# Patient Record
Sex: FEMALE | Race: OTHER | NOT HISPANIC OR LATINO | URBAN - METROPOLITAN AREA
[De-identification: names, ages, dates, MRNs, and addresses within clinical notes are randomized per-mention and may not be internally consistent; named-entity substitution may affect disease eponyms.]

---

## 2018-03-14 ENCOUNTER — INPATIENT (INPATIENT)
Facility: HOSPITAL | Age: 58
LOS: 1 days | Discharge: HOME | DRG: 310 | End: 2018-03-16
Attending: INTERNAL MEDICINE | Admitting: INTERNAL MEDICINE
Payer: COMMERCIAL

## 2018-03-14 VITALS
HEART RATE: 116 BPM | DIASTOLIC BLOOD PRESSURE: 100 MMHG | SYSTOLIC BLOOD PRESSURE: 156 MMHG | TEMPERATURE: 98 F | OXYGEN SATURATION: 98 % | RESPIRATION RATE: 16 BRPM

## 2018-03-14 DIAGNOSIS — E05.90 THYROTOXICOSIS, UNSPECIFIED WITHOUT THYROTOXIC CRISIS OR STORM: ICD-10-CM

## 2018-03-14 DIAGNOSIS — I48.91 UNSPECIFIED ATRIAL FIBRILLATION: ICD-10-CM

## 2018-03-14 LAB
ALBUMIN SERPL ELPH-MCNC: 4 G/DL — SIGNIFICANT CHANGE UP (ref 3.3–5)
ALP SERPL-CCNC: 80 U/L — SIGNIFICANT CHANGE UP (ref 40–120)
ALT FLD-CCNC: 19 U/L — SIGNIFICANT CHANGE UP (ref 10–45)
ANION GAP SERPL CALC-SCNC: 12 MMOL/L — SIGNIFICANT CHANGE UP (ref 5–17)
APTT BLD: 30.9 SEC — SIGNIFICANT CHANGE UP (ref 27.5–37.4)
AST SERPL-CCNC: 19 U/L — SIGNIFICANT CHANGE UP (ref 10–40)
BASOPHILS NFR BLD AUTO: 0.1 % — SIGNIFICANT CHANGE UP (ref 0–2)
BILIRUB SERPL-MCNC: 0.2 MG/DL — SIGNIFICANT CHANGE UP (ref 0.2–1.2)
BUN SERPL-MCNC: 9 MG/DL — SIGNIFICANT CHANGE UP (ref 7–23)
CALCIUM SERPL-MCNC: 9.9 MG/DL — SIGNIFICANT CHANGE UP (ref 8.4–10.5)
CHLORIDE SERPL-SCNC: 100 MMOL/L — SIGNIFICANT CHANGE UP (ref 96–108)
CK MB CFR SERPL CALC: 1.7 NG/ML — SIGNIFICANT CHANGE UP (ref 0–6.7)
CK SERPL-CCNC: 49 U/L — SIGNIFICANT CHANGE UP (ref 25–170)
CO2 SERPL-SCNC: 26 MMOL/L — SIGNIFICANT CHANGE UP (ref 22–31)
CREAT SERPL-MCNC: 0.51 MG/DL — SIGNIFICANT CHANGE UP (ref 0.5–1.3)
EOSINOPHIL NFR BLD AUTO: 3.2 % — SIGNIFICANT CHANGE UP (ref 0–6)
GLUCOSE SERPL-MCNC: 113 MG/DL — HIGH (ref 70–99)
HCT VFR BLD CALC: 36.4 % — SIGNIFICANT CHANGE UP (ref 34.5–45)
HGB BLD-MCNC: 12 G/DL — SIGNIFICANT CHANGE UP (ref 11.5–15.5)
INR BLD: 1.1 — SIGNIFICANT CHANGE UP (ref 0.88–1.16)
LYMPHOCYTES # BLD AUTO: 34.2 % — SIGNIFICANT CHANGE UP (ref 13–44)
MCHC RBC-ENTMCNC: 27.7 PG — SIGNIFICANT CHANGE UP (ref 27–34)
MCHC RBC-ENTMCNC: 33 G/DL — SIGNIFICANT CHANGE UP (ref 32–36)
MCV RBC AUTO: 84.1 FL — SIGNIFICANT CHANGE UP (ref 80–100)
MONOCYTES NFR BLD AUTO: 9.3 % — SIGNIFICANT CHANGE UP (ref 2–14)
NEUTROPHILS NFR BLD AUTO: 53.2 % — SIGNIFICANT CHANGE UP (ref 43–77)
PLATELET # BLD AUTO: 287 K/UL — SIGNIFICANT CHANGE UP (ref 150–400)
POTASSIUM SERPL-MCNC: 3.5 MMOL/L — SIGNIFICANT CHANGE UP (ref 3.5–5.3)
POTASSIUM SERPL-SCNC: 3.5 MMOL/L — SIGNIFICANT CHANGE UP (ref 3.5–5.3)
PROT SERPL-MCNC: 7.5 G/DL — SIGNIFICANT CHANGE UP (ref 6–8.3)
PROTHROM AB SERPL-ACNC: 12.2 SEC — SIGNIFICANT CHANGE UP (ref 9.8–12.7)
RBC # BLD: 4.33 M/UL — SIGNIFICANT CHANGE UP (ref 3.8–5.2)
RBC # FLD: 12.9 % — SIGNIFICANT CHANGE UP (ref 10.3–16.9)
SODIUM SERPL-SCNC: 138 MMOL/L — SIGNIFICANT CHANGE UP (ref 135–145)
T3FREE SERPL-MCNC: 8.5 PG/ML — HIGH (ref 1.71–3.71)
T4 AB SER-ACNC: 15.77 UG/DL — HIGH (ref 3.17–11.72)
T4 FREE SERPL-MCNC: 2.37 NG/DL — HIGH (ref 0.7–1.48)
TROPONIN T SERPL-MCNC: <0.01 NG/ML — SIGNIFICANT CHANGE UP (ref 0–0.01)
WBC # BLD: 7.4 K/UL — SIGNIFICANT CHANGE UP (ref 3.8–10.5)
WBC # FLD AUTO: 7.4 K/UL — SIGNIFICANT CHANGE UP (ref 3.8–10.5)

## 2018-03-14 PROCEDURE — 71275 CT ANGIOGRAPHY CHEST: CPT | Mod: 26

## 2018-03-14 PROCEDURE — 99285 EMERGENCY DEPT VISIT HI MDM: CPT | Mod: 25

## 2018-03-14 PROCEDURE — 99222 1ST HOSP IP/OBS MODERATE 55: CPT | Mod: AI

## 2018-03-14 PROCEDURE — 93010 ELECTROCARDIOGRAM REPORT: CPT

## 2018-03-14 PROCEDURE — 71045 X-RAY EXAM CHEST 1 VIEW: CPT | Mod: 26

## 2018-03-14 RX ORDER — HEPARIN SODIUM 5000 [USP'U]/ML
6000 INJECTION INTRAVENOUS; SUBCUTANEOUS ONCE
Qty: 0 | Refills: 0 | Status: COMPLETED | OUTPATIENT
Start: 2018-03-14 | End: 2018-03-14

## 2018-03-14 RX ORDER — POTASSIUM CHLORIDE 20 MEQ
20 PACKET (EA) ORAL ONCE
Qty: 0 | Refills: 0 | Status: COMPLETED | OUTPATIENT
Start: 2018-03-14 | End: 2018-03-14

## 2018-03-14 RX ORDER — HEPARIN SODIUM 5000 [USP'U]/ML
3000 INJECTION INTRAVENOUS; SUBCUTANEOUS EVERY 6 HOURS
Qty: 0 | Refills: 0 | Status: DISCONTINUED | OUTPATIENT
Start: 2018-03-14 | End: 2018-03-16

## 2018-03-14 RX ORDER — HEPARIN SODIUM 5000 [USP'U]/ML
6000 INJECTION INTRAVENOUS; SUBCUTANEOUS EVERY 6 HOURS
Qty: 0 | Refills: 0 | Status: DISCONTINUED | OUTPATIENT
Start: 2018-03-14 | End: 2018-03-16

## 2018-03-14 RX ORDER — PROPRANOLOL HCL 160 MG
20 CAPSULE, EXTENDED RELEASE 24HR ORAL
Qty: 0 | Refills: 0 | Status: DISCONTINUED | OUTPATIENT
Start: 2018-03-14 | End: 2018-03-15

## 2018-03-14 RX ORDER — HEPARIN SODIUM 5000 [USP'U]/ML
INJECTION INTRAVENOUS; SUBCUTANEOUS
Qty: 25000 | Refills: 0 | Status: DISCONTINUED | OUTPATIENT
Start: 2018-03-14 | End: 2018-03-15

## 2018-03-14 RX ADMIN — HEPARIN SODIUM 1300 UNIT(S)/HR: 5000 INJECTION INTRAVENOUS; SUBCUTANEOUS at 20:47

## 2018-03-14 RX ADMIN — Medication 20 MILLIGRAM(S): at 23:28

## 2018-03-14 RX ADMIN — HEPARIN SODIUM 6000 UNIT(S): 5000 INJECTION INTRAVENOUS; SUBCUTANEOUS at 20:47

## 2018-03-14 RX ADMIN — Medication 20 MILLIEQUIVALENT(S): at 20:48

## 2018-03-14 NOTE — ED ADULT TRIAGE NOTE - CHIEF COMPLAINT QUOTE
biba c/o dyspnea on exertion x 3 weeks w/ associated epigastric discomfort with activity.  Patient was told by her doctor that she has new onset afib. Denies discomfort during triage.

## 2018-03-14 NOTE — ED ADULT NURSE NOTE - OBJECTIVE STATEMENT
Pt BIBA to ed A&Ox3 from NP office for new onset afib. pt states she went to doctor office for intermittent lower abdominal pain/cramping she has had for 3 weeks. pt also reports SOB on exertion and when experiencing abd pain. denies cp, fever, n/v/d, burning urination, dysuria, dizziness, headache. pt reports chronic back pain.

## 2018-03-14 NOTE — ED PROVIDER NOTE - OBJECTIVE STATEMENT
59 y/o F w/no sig pmhx p/w generalized fatigue, palpitations and exertional SOB for 3 weeks, no fever/chills/cough or LE pain/swelling. No hemoptysis, OCP use, or pleuritic component to sx. Denies charles chest pain but endorses a constant chest discomfort for same duration. Seen by NP in office today, found to be in rapid afib, new onset. Denies significant weight loss.

## 2018-03-14 NOTE — ED PROVIDER NOTE - MEDICAL DECISION MAKING DETAILS
+ new onset afib, cause uncertain -- less likely missed MI, trop negative, no Q waves to suggest prior infarct. Less likely PE given lack of risk factors or physical exam findings, no electrolyte disturbance or infectious etiology (normal cxr, no fever/leukocytosis). Admitting to cardiology for advanced cardiac imaging, possible initiation of AC. CHADS-Vasc low, not initiating in ED. Rate <120, not initiating rate control.

## 2018-03-14 NOTE — ED ADULT TRIAGE NOTE - CCCP TRG CHIEF CMPLNT
10/27/2017     RE: Eliana Anders  [address]  [facility] [id number]     Dear Colleague,    Thank you for referring your patient, Eliana Anders, to the [facility] at [facility]. Please see a copy of my visit note below.    Patient initially seen for incidental lung nodule. She was concerned despite reassurance that unlikely to be cancer. At her request, I arranged for IR consult. At the consult, recommendation made to continue monitoring with CT surveillance. I spoke with patient today to confirm the plan and she is in agreement.     RTC February 2018 with CT chest    Again, thank you for allowing me to participate in the care of your patient.      Sincerely,    Susan Herzog MD      
shortness of breath

## 2018-03-14 NOTE — ED PROVIDER NOTE - PHYSICAL EXAMINATION
VITAL SIGNS: I have reviewed nursing notes and confirm.  CONSTITUTIONAL: Well-developed; well-nourished; in no acute distress.  SKIN: Agree with RN documentation regarding decubitus evaluation. Remainder of skin exam is warm and dry, no acute rash.  HEAD: Normocephalic; atraumatic.  EYES: PERRL, EOM intact; conjunctiva and sclera clear.  ENT: No nasal discharge; airway clear.  NECK: Supple; non tender.  CARD: S1, S2 normal; no murmurs, gallops, or rubs. + irregularly irregular rhythm  RESP: No wheezes, rales or rhonchi.  ABD: Normal bowel sounds; soft; non-distended; non-tender; no hepatosplenomegaly.  EXT: Normal ROM. No clubbing, cyanosis or edema.  LYMPH: No acute cervical adenopathy.  NEURO: Alert, oriented. Grossly unremarkable.  PSYCH: Cooperative, appropriate.

## 2018-03-14 NOTE — ED ADULT NURSE NOTE - CHPI ED SYMPTOMS NEG
no cough/no back pain/no nausea/no chills/no dizziness/no vomiting/no syncope/no diaphoresis/no chest pain/no fever

## 2018-03-14 NOTE — H&P ADULT - PROBLEM SELECTOR PLAN 1
with RVR - will start propranolol 20mg po BID given underlying  hyperthyroidism  will start IV heparin for now regardless results of CTA  [id number] is 1  echo ordered

## 2018-03-14 NOTE — H&P ADULT - NSHPLABSRESULTS_GEN_ALL_CORE
12.0   7.4   )-----------( 287      ( 14 Mar 2018 17:06 )             36.4   03-14    138  |  100  |  9   ----------------------------<  113<H>  3.5   |  26  |  0.51    Ca    9.9      14 Mar 2018 17:06    TPro  7.5  /  Alb  4.0  /  TBili  0.2  /  DBili  x   /  AST  19  /  ALT  19  /  AlkPhos  80  03-14    troponin neg x1    TSH <0.004    D-dimer elevate at 274

## 2018-03-14 NOTE — H&P ADULT - NSHPPHYSICALEXAM_GEN_ALL_CORE
NAD   156/100  afebrile   Neck - no masses/nodules appreciated  Chest: CTA b/l  Cor: S1S2 irreg irreg, no murmurs appreciated  Abd: soft, NT/ND  Ext: no edema, well perfused

## 2018-03-14 NOTE — H&P ADULT - HISTORY OF PRESENT ILLNESS
57 y/o woman non smoker with no known PMH (on no meds at home) was sent from PMD/NP office to [facility] ED for a new onset A fib on office EKG -120.  In ED VS: 147/89, A fib 100-120, afebrile, RR 20, O2 sat 98% RA, troponin negative x 1, d-dimer elevated at  274, TSH low at 0.004.  Patient is awaiting Chest CTA in ED.    Patient is poor historian.  For the past several weeks with fatigue, exertional SOB, palpitations and abdominal cramps without change in bowel.  Admits to unintentional weight loss but cant say how much.  Denies any prior hx endocrine/thyroid problems, denies any prior hx arrhythmias. 57 y/o woman non smoker with no known PMH (on no meds at home) was sent from PMD/NP office to [facility] ED for a new onset A fib on office EKG -120.  In ED VS: 147/89, A fib 100-120, afebrile, RR 20, O2 sat 98% RA, troponin negative x 1, d-dimer elevated at  274, TSH low at 0.004.  Patient is awaiting Chest CTA in ED. No meds given in ED.    Patient is poor historian.  For the past several weeks with fatigue, exertional SOB, palpitations and abdominal cramps without change in bowel.  Admits to unintentional weight loss but cant say how much.  Denies any prior hx endocrine/thyroid problems, denies any prior hx arrhythmias.

## 2018-03-14 NOTE — H&P ADULT - PROBLEM SELECTOR PLAN 2
endocrine consult called - will see pt in am   all requested labs and thyroid US ordered  no meds initiated on admission as d/w endocrine

## 2018-03-14 NOTE — H&P ADULT - ASSESSMENT
57 y/o woman non smoker with no known PMH (on no meds at home) was sent from PMD/NP office to [facility] ED for a new onset A fib rapid -120, labs revealed  hyperthyroidism with TSH <0.004 and d-dimer elevated at 274.  CTA PE protocol pending,

## 2018-03-15 LAB
ANION GAP SERPL CALC-SCNC: 11 MMOL/L — SIGNIFICANT CHANGE UP (ref 5–17)
APTT BLD: 150 SEC — CRITICAL HIGH (ref 27.5–37.4)
APTT BLD: 53.6 SEC — HIGH (ref 27.5–37.4)
APTT BLD: 87 SEC — HIGH (ref 27.5–37.4)
BUN SERPL-MCNC: 11 MG/DL — SIGNIFICANT CHANGE UP (ref 7–23)
CALCIUM SERPL-MCNC: 10 MG/DL — SIGNIFICANT CHANGE UP (ref 8.4–10.5)
CHLORIDE SERPL-SCNC: 102 MMOL/L — SIGNIFICANT CHANGE UP (ref 96–108)
CHOLEST SERPL-MCNC: 121 MG/DL — SIGNIFICANT CHANGE UP (ref 10–199)
CO2 SERPL-SCNC: 28 MMOL/L — SIGNIFICANT CHANGE UP (ref 22–31)
CREAT SERPL-MCNC: 0.64 MG/DL — SIGNIFICANT CHANGE UP (ref 0.5–1.3)
GLUCOSE SERPL-MCNC: 104 MG/DL — HIGH (ref 70–99)
HBA1C BLD-MCNC: 5.3 % — SIGNIFICANT CHANGE UP (ref 4–5.6)
HCT VFR BLD CALC: 37 % — SIGNIFICANT CHANGE UP (ref 34.5–45)
HCT VFR BLD CALC: 39.9 % — SIGNIFICANT CHANGE UP (ref 34.5–45)
HDLC SERPL-MCNC: 49 MG/DL — SIGNIFICANT CHANGE UP (ref 40–125)
HGB BLD-MCNC: 12 G/DL — SIGNIFICANT CHANGE UP (ref 11.5–15.5)
HGB BLD-MCNC: 13 G/DL — SIGNIFICANT CHANGE UP (ref 11.5–15.5)
LIPID PNL WITH DIRECT LDL SERPL: 57 MG/DL — SIGNIFICANT CHANGE UP
MAGNESIUM SERPL-MCNC: 1.7 MG/DL — SIGNIFICANT CHANGE UP (ref 1.6–2.6)
MCHC RBC-ENTMCNC: 27.3 PG — SIGNIFICANT CHANGE UP (ref 27–34)
MCHC RBC-ENTMCNC: 27.8 PG — SIGNIFICANT CHANGE UP (ref 27–34)
MCHC RBC-ENTMCNC: 32.4 G/DL — SIGNIFICANT CHANGE UP (ref 32–36)
MCHC RBC-ENTMCNC: 32.6 G/DL — SIGNIFICANT CHANGE UP (ref 32–36)
MCV RBC AUTO: 84.1 FL — SIGNIFICANT CHANGE UP (ref 80–100)
MCV RBC AUTO: 85.4 FL — SIGNIFICANT CHANGE UP (ref 80–100)
PLATELET # BLD AUTO: 284 K/UL — SIGNIFICANT CHANGE UP (ref 150–400)
PLATELET # BLD AUTO: 316 K/UL — SIGNIFICANT CHANGE UP (ref 150–400)
POTASSIUM SERPL-MCNC: 3.9 MMOL/L — SIGNIFICANT CHANGE UP (ref 3.5–5.3)
POTASSIUM SERPL-SCNC: 3.9 MMOL/L — SIGNIFICANT CHANGE UP (ref 3.5–5.3)
RBC # BLD: 4.4 M/UL — SIGNIFICANT CHANGE UP (ref 3.8–5.2)
RBC # BLD: 4.67 M/UL — SIGNIFICANT CHANGE UP (ref 3.8–5.2)
RBC # FLD: 13.1 % — SIGNIFICANT CHANGE UP (ref 10.3–16.9)
RBC # FLD: 13.3 % — SIGNIFICANT CHANGE UP (ref 10.3–16.9)
SODIUM SERPL-SCNC: 141 MMOL/L — SIGNIFICANT CHANGE UP (ref 135–145)
T3 SERPL-MCNC: 343 NG/DL — HIGH (ref 80–200)
T4 FREE SERPL-MCNC: 2.09 NG/DL — HIGH (ref 0.7–1.48)
THYROPEROXIDASE AB SERPL-ACNC: 273 IU/ML — HIGH (ref 0–34.9)
TOTAL CHOLESTEROL/HDL RATIO MEASUREMENT: 2.5 RATIO — LOW (ref 3.3–7.1)
TRIGL SERPL-MCNC: 76 MG/DL — SIGNIFICANT CHANGE UP (ref 10–149)
TROPONIN T SERPL-MCNC: <0.01 NG/ML — SIGNIFICANT CHANGE UP (ref 0–0.01)
TSH SERPL-MCNC: <0.004 UIU/ML — LOW (ref 0.35–4.94)
WBC # BLD: 6.6 K/UL — SIGNIFICANT CHANGE UP (ref 3.8–10.5)
WBC # BLD: 8.6 K/UL — SIGNIFICANT CHANGE UP (ref 3.8–10.5)
WBC # FLD AUTO: 6.6 K/UL — SIGNIFICANT CHANGE UP (ref 3.8–10.5)
WBC # FLD AUTO: 8.6 K/UL — SIGNIFICANT CHANGE UP (ref 3.8–10.5)

## 2018-03-15 PROCEDURE — 99233 SBSQ HOSP IP/OBS HIGH 50: CPT

## 2018-03-15 PROCEDURE — 93306 TTE W/DOPPLER COMPLETE: CPT | Mod: 26

## 2018-03-15 PROCEDURE — 99222 1ST HOSP IP/OBS MODERATE 55: CPT | Mod: GC

## 2018-03-15 RX ORDER — NYSTATIN CREAM 100000 [USP'U]/G
1 CREAM TOPICAL
Qty: 0 | Refills: 0 | Status: DISCONTINUED | OUTPATIENT
Start: 2018-03-15 | End: 2018-03-16

## 2018-03-15 RX ORDER — MAGNESIUM OXIDE 400 MG ORAL TABLET 241.3 MG
400 TABLET ORAL ONCE
Qty: 0 | Refills: 0 | Status: COMPLETED | OUTPATIENT
Start: 2018-03-15 | End: 2018-03-15

## 2018-03-15 RX ORDER — POTASSIUM CHLORIDE 20 MEQ
20 PACKET (EA) ORAL ONCE
Qty: 0 | Refills: 0 | Status: COMPLETED | OUTPATIENT
Start: 2018-03-15 | End: 2018-03-15

## 2018-03-15 RX ORDER — HEPARIN SODIUM 5000 [USP'U]/ML
INJECTION INTRAVENOUS; SUBCUTANEOUS
Qty: 25000 | Refills: 0 | Status: DISCONTINUED | OUTPATIENT
Start: 2018-03-15 | End: 2018-03-16

## 2018-03-15 RX ORDER — BACITRACIN ZINC 500 UNIT/G
1 OINTMENT IN PACKET (EA) TOPICAL DAILY
Qty: 0 | Refills: 0 | Status: DISCONTINUED | OUTPATIENT
Start: 2018-03-15 | End: 2018-03-16

## 2018-03-15 RX ORDER — PROPRANOLOL HCL 160 MG
20 CAPSULE, EXTENDED RELEASE 24HR ORAL THREE TIMES A DAY
Qty: 0 | Refills: 0 | Status: DISCONTINUED | OUTPATIENT
Start: 2018-03-15 | End: 2018-03-16

## 2018-03-15 RX ORDER — PANTOPRAZOLE SODIUM 20 MG/1
40 TABLET, DELAYED RELEASE ORAL
Qty: 0 | Refills: 0 | Status: DISCONTINUED | OUTPATIENT
Start: 2018-03-15 | End: 2018-03-16

## 2018-03-15 RX ADMIN — HEPARIN SODIUM 1500 UNIT(S)/HR: 5000 INJECTION INTRAVENOUS; SUBCUTANEOUS at 10:16

## 2018-03-15 RX ADMIN — MAGNESIUM OXIDE 400 MG ORAL TABLET 400 MILLIGRAM(S): 241.3 TABLET ORAL at 11:26

## 2018-03-15 RX ADMIN — Medication 20 MILLIEQUIVALENT(S): at 11:25

## 2018-03-15 RX ADMIN — HEPARIN SODIUM 1300 UNIT(S)/HR: 5000 INJECTION INTRAVENOUS; SUBCUTANEOUS at 03:23

## 2018-03-15 RX ADMIN — Medication 1 APPLICATION(S): at 07:25

## 2018-03-15 RX ADMIN — HEPARIN SODIUM 0 UNIT(S)/HR: 5000 INJECTION INTRAVENOUS; SUBCUTANEOUS at 18:16

## 2018-03-15 RX ADMIN — Medication 20 MILLIGRAM(S): at 22:22

## 2018-03-15 RX ADMIN — HEPARIN SODIUM 3000 UNIT(S): 5000 INJECTION INTRAVENOUS; SUBCUTANEOUS at 10:19

## 2018-03-15 RX ADMIN — NYSTATIN CREAM 1 APPLICATION(S): 100000 CREAM TOPICAL at 05:46

## 2018-03-15 RX ADMIN — NYSTATIN CREAM 1 APPLICATION(S): 100000 CREAM TOPICAL at 22:28

## 2018-03-15 RX ADMIN — HEPARIN SODIUM 1300 UNIT(S)/HR: 5000 INJECTION INTRAVENOUS; SUBCUTANEOUS at 19:18

## 2018-03-15 RX ADMIN — Medication 20 MILLIGRAM(S): at 11:25

## 2018-03-15 NOTE — PROGRESS NOTE ADULT - ASSESSMENT
59 y/o woman non smoker with no known PMH (on no meds at home) was sent from PMD/NP office to [facility] ED for a new onset A fib rapid -120, labs revealed  hyperthyroidism with TSH <0.004 and d-dimer elevated at 274. Admitted to [facility] for further management of afib RVR in the setting of hyperthyroidism.

## 2018-03-15 NOTE — PROVIDER CONTACT NOTE (CRITICAL VALUE NOTIFICATION) - ACTION/TREATMENT ORDERED:
STOP infusion for 60 minutes, then DECREASE dose rate by: 200 Unit(s)/Hr (2 mL/Hr). New Rate: 1300 Unit(s)/Hr (13 mL/Hr)

## 2018-03-15 NOTE — PROVIDER CONTACT NOTE (CRITICAL VALUE NOTIFICATION) - RECOMMENDATIONS
STOP infusion for 60 minutes, then DECREASE dose rate by: 200 Unit(s)/Hr (2 mL/Hr).New Rate: 1300 Unit(s)/Hr (13 mL/Hr)

## 2018-03-15 NOTE — CONSULT NOTE ADULT - SUBJECTIVE AND OBJECTIVE BOX
HPI:  59yo Female, no known PMH (not on any meds at home) was sent in from PMD/ NP office to [facility] ED for a new onset rapid Afib on office EKG (-120).  In ED VS: 147/89, Afib 100-120bpm, afebrile, RR 20, O2 sat 98% RA, troponin negative x1, d-dimer elevated at  274, TSH < 0.004.     Patient states bailey the past several weeks she has had fatigue, exertional SOB, palpitations and abdominal cramps without change in bowel habits. Last BM was 2 days ago. Patient admits to unintentional weight loss but she is unable to quantify.  Denies any prior h/o endocrine/thyroid problems, denies any prior h/o arrhythmias. Denies syncope, near syncope, dizziness.     Patient denies palpitations currently despite Afib with RVR at 110bpm. Patient states she is currently unaware of her irregular heart rhythm. She reports her abdominal pain has also now improved.     PAST MEDICAL & SURGICAL HISTORY:  No pertinent past medical history  No significant past surgical history      No pertinent family history in first degree relatives    Social History:   Smoking Denies   Drugs Denies   ETOH Denies     pertinent home medications: None    Inpatient Medications:   BACItracin   Ointment 1 Application(s) Topical daily  heparin  Infusion.  Unit(s)/Hr IV Continuous <Continuous>  heparin  Injectable 6000 Unit(s) IV Push every 6 hours PRN  heparin  Injectable 3000 Unit(s) IV Push every 6 hours PRN  nystatin Powder 1 Application(s) Topical two times a day  propranolol 20 milliGRAM(s) Oral two times a day      No Known Allergies      ROS:   CONSTITUTIONAL: No fever, +weight loss, +fatigue, see HPI  EYES: Pt denies  RESPIRATORY: Denies cough, wheezing, chills or hemoptysis  CARDIOVASCULAR: +exertional SOB, +Palpitations, see HPI  GASTROINTESTINAL: +Abdominal cramping, see HPI  NEUROLOGICAL: Pt denies  SKIN: Pt denies   PSYCHIATRIC: Pt denies  HEME/LYMPH: Pt denies    PHYSICAL:  T(C): 36.1 (03-15-18 @ 14:51), Max: 36.9 (18 @ 22:26)  HR: 102 (03-15-18 @ 11:20) (87 - 116)  BP: 135/83 (03-15-18 @ 11:20) (127/85 - 176/94)  RR: 16 (03-15-18 @ 11:20) (16 - 18)  SpO2: 96% (03-15-18 @ 11:20) (94% - 98%)  Daily Height in cm: 147.32 (15 Mar 2018 00:44)    Daily Weight in k.2 (15 Mar 2018 06:19)    Appearance: NAD, pleasant, conversant   Cardiovascular: Irregular, Tachycardia , S1S2, No JVD, No murmurs, No edema  Respiratory: Lungs clear to auscultation bilaterally.  No wheeze, rhonchi, rales  Gastrointestinal:  Soft, NT/ND, + BS	  Neurologic: A&O x3, No deficit noted  Extremities: WWP, No edema, pulses intact      LABS:                        12.0   6.6   )-----------( 284      ( 15 Mar 2018 08:00 )             37.0     03-15    141  |  102  |  11  ----------------------------<  104<H>  3.9   |  28  |  0.64    Ca    10.0      15 Mar 2018 08:00  Mg     1.7     03-15    TPro  7.5  /  Alb  4.0  /  TBili  0.2  /  DBili  x   /  AST  19  /  ALT  19  /  AlkPhos  80  03-14    PT/INR - ( 14 Mar 2018 17:06 )   PT: 12.2 sec;   INR: 1.10     PTT - ( 15 Mar 2018 09:31 )  PTT:53.6 sec    TSH < 0.004  Free Thyroxine 2.09  T4, serum 15.7  Triiodothyronine, Serum 8.5       LIVER FUNCTIONS - ( 14 Mar 2018 17:06 )  Alb: 4.0 g/dL / Pro: 7.5 g/dL / ALK PHOS: 80 U/L / ALT: 19 U/L / AST: 19 U/L / GGT: x             EKG: Afib with RVR, , minimal voltage criteria for LVH    Telemetry: Rate controlled Afib, HR range mostly 90s-110s.    ECHO:     Prior EP procedures: None    Cath / stress / Cardiac CTa:    Assessment/ Plan:

## 2018-03-15 NOTE — PROGRESS NOTE ADULT - SUBJECTIVE AND OBJECTIVE BOX
Interventional Cardiology PA Adult Progress Note    Subjective Assessment: Pt seen and examined at bedside, she states that she was mostly asx on arrival, other than mild UMANA. She made an appt with her PMD for occasional stomach pain, and she was found to be in afib RVR. She denies palpitations, fever, chills, chest pain, SOB @ rest, LE edema, n/v/d. Pt also reports unintentional weight loss of "a few lbs" over the past few weeks. She denies syncope, LOC, excessive sweating, chills.     CC: afib RVR  12 pt ROS otherwise negative except for HPI and subjective    MEDICATIONS:  propranolol 20 milliGRAM(s) Oral two times a day  BACItracin   Ointment 1 Application(s) Topical daily  heparin  Infusion.  Unit(s)/Hr IV Continuous <Continuous>  heparin  Injectable 6000 Unit(s) IV Push every 6 hours PRN  heparin  Injectable 3000 Unit(s) IV Push every 6 hours PRN  nystatin Powder 1 Application(s) Topical two times a day	    [PHYSICAL EXAM:  TELEMETRY:  T(C): 35.7 (03-15-18 @ 06:19), Max: 36.9 (03-14-18 @ 22:26)  HR: 102 (03-15-18 @ 11:20) (87 - 116)  BP: 135/83 (03-15-18 @ 11:20) (127/85 - 176/94)  RR: 16 (03-15-18 @ 11:20) (16 - 18)  SpO2: 96% (03-15-18 @ 11:20) (94% - 98%)  Wt(kg): --  I&O's Summary    14 Mar 2018 07:01  -  15 Mar 2018 07:00  --------------------------------------------------------  IN: 274 mL / OUT: 0 mL / NET: 274 mL    15 Mar 2018 07:01  -  15 Mar 2018 14:48  --------------------------------------------------------  IN: 180 mL / OUT: 0 mL / NET: 180 mL      Height (cm): 147.32 (03-15 @ 00:44)  Weight (kg): 74.8 (03-14 @ 19:28)  BMI (kg/m2): 34.5 (03-15 @ 00:44)  BSA (m2): 1.68 (03-15 @ 00:44)  [place]:  Central/PICC/Mid Line:                                         Appearance: Normal	  HEENT: Normal oral mucosa, PERRL, EOMI	  Neck: Supple,- JVD  Cardiovascular: rapid, irregular, No JVD, No murmurs  Respiratory: Lungs clear to auscultation/No Rales, Rhonchi, Wheezing	  Gastrointestinal: obese, soft, Non-tender  Skin: No rashes, No ecchymoses, No cyanosis  Extremities: Normal range of motion, No clubbing, cyanosis or edema  Vascular: Peripheral pulses palpable 2+ bilaterally  Neurologic: Non-focal  Psychiatry: A & O x 3, Mood & affect appropriate    	    ECG:  	  RADIOLOGY:   DIAGNOSTIC TESTING:  [ ] Echocardiogram: < from: Echocardiogram (03.15.18 @ 09:36) >  Patient tachycardic throughout the study. The left atrium is mildly dilated. Right atrial size is normal. Structurally normal aortic valve. The aortic valve is trileaflet. No aortic regurgitation noted. No hemodynamically significant valvular aortic stenosis.  There is mild mitral valve thickening. There is mild to moderate mitral regurgitation.  Structurally normal tricuspid valve. There is mild tricuspid regurgitation. The pulmonary artery systolic pressure is estimated to be 32 mmHg. The pulmonic valve is not well visualized. There is trace pulmonic regurgitation.  The right ventricle is normal in size and function. There is mild concentric left ventricular hypertrophy. The leftventricular wall motion is normal. The left ventricular ejection fraction estimated to be 55-60%.   No aortic root dilatation. There is no pericardial effusion. No prior study for comparison.    [ ]  Catheterization:  [ ] Stress Test:    [ ] ZAID  OTHER: 	    LABS:	 	  CARDIAC MARKERS:                        12.0   6.6   )-----------( 284      ( 15 Mar 2018 08:00 )             37.0     03-15    141  |  102  |  11  ----------------------------<  104<H>  3.9   |  28  |  0.64    Ca    10.0      15 Mar 2018 08:00  Mg     1.7     03-15    TPro  7.5  /  Alb  4.0  /  TBili  0.2  /  DBili  x   /  AST  19  /  ALT  19  /  AlkPhos  80  03-14    proBNP:   Lipid Profile:   HgA1c: Hemoglobin A1C, Whole Blood: 5.3 % (03-15 @ 08:00)    TSH: Thyroid Stimulating Hormone, Serum: <0.004 uIU/mL (03-15 @ 08:00)  Thyroid Stimulating Hormone, Serum: <0.004 uIU/mL (03-14 @ 17:06)    PT/INR - ( 14 Mar 2018 17:06 )   PT: 12.2 sec;   INR: 1.10     PTT - ( 15 Mar 2018 09:31 )  PTT:53.6 sec

## 2018-03-15 NOTE — PROGRESS NOTE ADULT - PROBLEM SELECTOR PLAN 2
Hyperthyroidism (low TSH, elevated free t3, t4, thyroxine)  - thyroid US ordered  - thyroid labs ordered by Endocrine (TPO, TSI, TBG, TSH receptor antibody)  - ? possible biopsy, continue hep gtt  - follow up endocrine recs

## 2018-03-15 NOTE — PROGRESS NOTE ADULT - PROBLEM SELECTOR PLAN 1
New onset rapid afib (EP consulted)  - Rates persistently 100-120, propanolol 20 mg PO BID given underlying hyperthyroidism, Beta blocker of choice  - Heparin gtt initiated per protocol. discuss further NOAC with EP. Continue hep gtt for now in case of in house procedures, [id number] is 1  - Echo 3/15 revealed LA dilated, mild-mod MR, LVEF 55-60%, LV wall motion is normal, mild concentric LVH  - replete lytes (K 3.90--20 Meq, Mg 1.7-- 400 mg)   - troponin negative x2. CP free

## 2018-03-15 NOTE — PATIENT PROFILE ADULT. - NS TRANSFER PATIENT BELONGINGS
Cell Phone/PDA (specify)/, 2 yellow metal bracelets, yellow metal earings and yellow metal necklace, yellow metal cross./Electronic Device (specify)/Jewelry/Money (specify)/Other belongings/Clothing

## 2018-03-15 NOTE — CONSULT NOTE ADULT - ATTENDING COMMENTS
Pt seen on rounds with Dr. Roman this afternoon.  58-year-old woman with a presumably neg PMH (though has LVH and LAE on echo) admitted with new onset AF and hyperthyroidism.  Symptoms of hyperthyroidism appear to be limited to weight loss (which she is able to determine only by looser clothes) and ?? heat intolerance.  Denies any recent resp tract infections.  Denies any neck swelling or discomfort in the thyroid bed.  Family history is unfortunately incomplete but is neg for thyroid disease.  She has no definite thyroid enlargement on exam (though her body habitus makes exam difficult) and no thyroid tenderness.  Also has no proptosis or lid retraction.  Skin is warm and dry.  Free T4 (2.37) and free T3 (8.5) are both elevated, and TSH is undetectable.    Pt may well have Graves' disease, but has no obvious signs.  a painless (sub-acute) thyroiditis is also a possibility.  The test which would be most necessary to arrive at a diagnosis is an I-123 uptake, which cannot be done because of IV contrast given yesterday.  Will await results of thyroid antibodies (including TSI) and also needs sed rate.  Ultrasound may add some additional information.  Will hold off on starting anti-thyroid drugs.  Would consider switching to a longer-acting beta blocker for use as an outpatient

## 2018-03-15 NOTE — PROGRESS NOTE ADULT - PROBLEM SELECTOR PLAN 3
- D dimer elevated @ 272  - CTA PE protocol negative for pulmonary embolism    DVT PPX: Hep gtt  GI PPX: protonix  DISPO: pending endocrine and EP recs. Continue to monitor rates

## 2018-03-15 NOTE — CONSULT NOTE ADULT - SUBJECTIVE AND OBJECTIVE BOX
HPI: 58yFemale    Age at Dx:  How dx:  Hx and duration of insulin:  Current Therapy:  Hx of hypoglycemia  Hx of DKA/HHS?    Home FSG:  Fasting  Lunch  Dinner  Bed    Hx of other regimens  Complications:  Outpatient Endo:    PMH & Surgical Hx:ATRIAL FIBRILLATION WITHRAPID VENTRICULAR  No h/o HF  No pertinent family history in first degree relatives  Handoff  MEWS Score  No pertinent past medical history  Atrial fibrillation with rapid ventricular response  Hyperthyroidism without crisis  Atrial fibrillation, new onset  No significant past surgical history  SHORTNESS OF BREATH      FH:  DM:  Thyroid:  Autoimmune:  Other:    SH:  Smoking  Etoh:  Recreational Drugs:  Social Life:    Home Meds:    Current Meds:  BACItracin   Ointment 1 Application(s) Topical daily  heparin  Infusion.  Unit(s)/Hr IV Continuous <Continuous>  heparin  Injectable 6000 Unit(s) IV Push every 6 hours PRN  heparin  Injectable 3000 Unit(s) IV Push every 6 hours PRN  nystatin Powder 1 Application(s) Topical two times a day  propranolol 20 milliGRAM(s) Oral two times a day      Allergies:  No Known Allergies      ROS:  Denies the following except as indicated.    General: weight loss/weight gain, decreased appetite, fatigue  Eyes: Blurry vision, double vision, visual changes  ENT: Throat pain, changes in voice,   CV: palpitations, SOB, CP, cough  GI: NVD, difficulty swallowing, abdominal pain  : polyuria, dysuria  Endo: abnormal menses, temperature intolerance, decreased libido  MSK: weakness, joint pain  Skin: rash, dryness, diaphoresis  Heme: Easy bruising,bleeding  Neuro: HA, dizziness, lightheadedness, numbness tingling  Psych: Anxiety, Depression    Vital Signs Last 24 Hrs  T(C): 35.7 (15 Mar 2018 06:19), Max: 36.9 (14 Mar 2018 22:26)  T(F): 96.3 (15 Mar 2018 06:19), Max: 98.4 (14 Mar 2018 22:26)  HR: 102 (15 Mar 2018 11:20) (87 - 116)  BP: 135/83 (15 Mar 2018 11:20) (127/85 - 176/94)  BP(mean): --  RR: 16 (15 Mar 2018 11:20) (16 - 18)  SpO2: 96% (15 Mar 2018 11:20) (94% - 98%)  Height (cm): 147.32 (03-15 @ 00:44)  Weight (kg): 74.8 (03-14 @ 19:28)  BMI (kg/m2): 34.5 (03-15 @ 00:44)      Constitutional: wn/wd in NAD.   HEENT: NCAT, MMM, OP clear, EOMI, , no proptosis or lid retraction  Neck: no thyromegaly or palpable thyroid nodules   Respiratory: lungs CTAB.  Cardiovascular: regular rhythm, normal S1 and S2, no audible murmurs, no peripheral edema  GI: soft, NT/ND, no masses/HSM appreciated.  Neurology: no tremors, DTR 2+  Skin: no visible rashes/lesions  Psychiatric: AAO x 3, normal affect/mood.  Ext: radial pulses intact, DP pulses intact, extremities warm, no cyanosis, clubbing or edema.       LABS:                        12.0   6.6   )-----------( 284      ( 15 Mar 2018 08:00 )             37.0     03-15    141  |  102  |  11  ----------------------------<  104<H>  3.9   |  28  |  0.64    Ca    10.0      15 Mar 2018 08:00  Mg     1.7     03-15    TPro  7.5  /  Alb  4.0  /  TBili  0.2  /  DBili  x   /  AST  19  /  ALT  19  /  AlkPhos  80  03-14    PT/INR - ( 14 Mar 2018 17:06 )   PT: 12.2 sec;   INR: 1.10          PTT - ( 15 Mar 2018 09:31 )  PTT:53.6 sec    Hemoglobin A1C, Whole Blood: 5.3 (03-15 @ 08:00)    Thyroid Stimulating Hormone, Serum: <0.004 (03-15 @ 08:00)  Thyroid Stimulating Hormone, Serum: <0.004 (03-14 @ 17:06)      RADIOLOGY & ADDITIONAL STUDIES:    CAPILLARY BLOOD GLUCOSE      A/P:58y Female    1.  DM  Please continue lantus       units at night / morning.  Please continue lispro      units before each meal.  Please continue lispro moderate / low dose sliding scale four times daily with meals and at bedtime    Pt's fingerstick glucose goal is     Will continue to monitor     For discharge, pt can continue    Pt can follow up at discharge with [facility] by calling  to make an appointment.   Will discuss case with     and update primary team HPI: 59 yo F with no significant PMH presents from PCP office with new onset afib. Patient also complains of abdominal pain and UMANA for three weeks which she has never had in the past. She is unsure if she lost weight but has noticed her clothes becoming loser. She denies any change in her eyes, voice, or skin. She has not notice that she has developed a worsening heat intolerance. She has not felt any palpitations. SHe goes for yearly check-up with her physicians and has been told that she is in good health. She has not noticed any changes in bowel pattern, appetite, activity level, no recent illnesses or pain in her throat.    PMH & Surgical Hx  No pertinent past medical history  No significant past surgical history      FH:  No significant Fam hx    SH:  Smoking: never  Etoh: rarely  Recreational Drugs:denies  Social Life:     Home Meds:  none  Current Meds:  BACItracin   Ointment 1 Application(s) Topical daily  heparin  Infusion.  Unit(s)/Hr IV Continuous <Continuous>  heparin  Injectable 6000 Unit(s) IV Push every 6 hours PRN  heparin  Injectable 3000 Unit(s) IV Push every 6 hours PRN  nystatin Powder 1 Application(s) Topical two times a day  propranolol 20 milliGRAM(s) Oral two times a day      Allergies:  No Known Allergies      ROS:  Denies the following except as indicated.    General: weight loss/weight gain, decreased appetite, fatigue  Eyes: Blurry vision, double vision, visual changes  ENT: Throat pain, changes in voice,   CV: palpitations, SOB, CP, cough  GI: NVD, difficulty swallowing, abdominal pain  : polyuria, dysuria  Endo: abnormal menses, temperature intolerance, decreased libido  MSK: weakness, joint pain  Skin: rash, dryness, diaphoresis  Heme: Easy bruising,bleeding  Neuro: HA, dizziness, lightheadedness, numbness tingling  Psych: Anxiety, Depression    Vital Signs Last 24 Hrs  T(C): 35.7 (15 Mar 2018 06:19), Max: 36.9 (14 Mar 2018 22:26)  T(F): 96.3 (15 Mar 2018 06:19), Max: 98.4 (14 Mar 2018 22:26)  HR: 102 (15 Mar 2018 11:20) (87 - 116)  BP: 135/83 (15 Mar 2018 11:20) (127/85 - 176/94)  BP(mean): --  RR: 16 (15 Mar 2018 11:20) (16 - 18)  SpO2: 96% (15 Mar 2018 11:20) (94% - 98%)  Height (cm): 147.32 (03-15 @ 00:44)  Weight (kg): 74.8 (03-14 @ 19:28)  BMI (kg/m2): 34.5 (03-15 @ 00:44)      Constitutional: obese in NAD.   HEENT: NCAT, MMM, OP clear, EOMI, , no proptosis or lid retraction  Neck: no thyromegaly or palpable thyroid nodules   Respiratory: lungs CTAB.  Cardiovascular: irregularly irregular, no audible murmurs, no peripheral edema  GI: soft, NT/ND, no masses/HSM appreciated.  Neurology: no tremors, DTR 2+  Skin: no visible rashes/lesions  Psychiatric: AAO x 3, normal affect/mood.  Ext: radial pulses intact, DP pulses intact, extremities warm, no cyanosis, clubbing or edema.       LABS:                        12.0   6.6   )-----------( 284      ( 15 Mar 2018 08:00 )             37.0     03-15    141  |  102  |  11  ----------------------------<  104<H>  3.9   |  28  |  0.64    Ca    10.0      15 Mar 2018 08:00  Mg     1.7     03-15    TPro  7.5  /  Alb  4.0  /  TBili  0.2  /  DBili  x   /  AST  19  /  ALT  19  /  AlkPhos  80  03-14    PT/INR - ( 14 Mar 2018 17:06 )   PT: 12.2 sec;   INR: 1.10          PTT - ( 15 Mar 2018 09:31 )  PTT:53.6 sec    Hemoglobin A1C, Whole Blood: 5.3 (03-15 @ 08:00)    Thyroid Stimulating Hormone, Serum: <0.004 (03-15 @ 08:00)  Thyroid Stimulating Hormone, Serum: <0.004 (03-14 @ 17:06)      RADIOLOGY & ADDITIONAL STUDIES:    CAPILLARY BLOOD GLUCOSE      A/P:59 yo F with no significant PMH presents from PCP office with new onset afib found to be hyperthyroid    1.  Hyperthyroidism- Etiology difficult to say at this point. TFTs confirm hyperthyroid state. DDx include Graves disease vs hashimoto thyrotoxicosis vs painless subacute thyroiditis vs toxic nodule. We are limited in doing an uptake scan due to the fact that she already received contrast. Will fu TPO, TSI, TSHR Ab. Fu thyroid US, Cont rate control but rec switching to longer acting beta blocker. Will await lab and US results before considering anti-thyroid medications.     Will continue to monitor       Pt can follow up at discharge with [facility] by calling  to make an appointment.   Will discuss case with Dr. Rincon  and update primary team

## 2018-03-15 NOTE — PROVIDER CONTACT NOTE (CRITICAL VALUE NOTIFICATION) - BACKGROUND
59 y/o woman non smoker with no known PMH (on no meds at home) was sent from PMD/NP office to [facility] ED for a new onset A fib rapid -120, labs revealed  hyperthyroidism with TSH <0.004 and d-dimer elevated at 274. CTA PE protocol pending,

## 2018-03-15 NOTE — CONSULT NOTE ADULT - ASSESSMENT
59yo Female, no known PMH (not on any meds at home) who was sent from PMD/ NP office to [facility] ED for a new onset rapid Afib rapid -120, labs revealed  hyperthyroidism with TSH <0.004 and d-dimer elevated at 274. CT PE protocol was negative. EP consulted for new onset Afib.    - Agree with initiation of beta blocker for HR control. Agree with Propranolol uptitrate as needed.  - Patient currently asymptomatic as rest, but symptomatic pre hospital with increased UMANA, palpitations, fatigue. Would favor attempt at return to sinus rhythm. Plan for ZAID/ DCCV on Friday if patient/ team are amenable.   - Sczc0s-Rxlt = 1 (Female). Would favor short term oral AC with Xarelto 20mg once daily, especially after DCCV. Patient states she takes no medicines and would rather have a once per day pill if possible. 57yo Female, no known PMH (not on any meds at home) who was sent from PMD/ NP office to [facility] ED for a new onset rapid Afib rapid -120, labs revealed  hyperthyroidism with TSH <0.004 and d-dimer elevated at 274. CT PE protocol was negative. EP consulted for new onset Afib.    - Agree with initiation of beta blocker for HR control. Agree with Propranolol uptitrate as needed.  - Patient currently asymptomatic as rest, but symptomatic pre hospital with increased UMANA, palpitations, fatigue. Would consider attempt at return to sinus rhythm. Plan for ZAID/ DCCV on Friday if patient/ team are amenable.   - Ivze7w-Mwhd = 1 (Female). Would favor short term oral AC with Xarelto 20mg once daily, especially after DCCV. Patient states she takes no medicines and would rather have a once per day pill if possible. 57yo Female, no known PMH (not on any meds at home) who was sent from PMD/ NP office to [facility] ED for a new onset rapid Afib rapid -120, labs revealed  hyperthyroidism with TSH <0.004 and d-dimer elevated at 274. CT PE protocol was negative. EP consulted for new onset Afib.    - Agree with initiation of beta blocker for HR control. Agree with Propranolol uptitrate as needed. Current HR range 90s-110s.  - Patient currently asymptomatic as rest, but symptomatic pre hospital with increased UMANA, palpitations, and fatigue. Would consider attempt at return to sinus rhythm either during inpatient period or return as outpatient. Team would prefer to defer ZAID/DCCV currently as per endo she is likely to revert to arrythmia given currently hyperthyroid state.   - Klnz9h-Swcn = 1 (Female). Would favor short term oral AC with Xarelto 20mg once daily in preparation for outpatient ZAID/DCCV when thyroid under better control.

## 2018-03-16 ENCOUNTER — TRANSCRIPTION ENCOUNTER (OUTPATIENT)
Age: 58
End: 2018-03-16

## 2018-03-16 VITALS
OXYGEN SATURATION: 98 % | SYSTOLIC BLOOD PRESSURE: 134 MMHG | DIASTOLIC BLOOD PRESSURE: 82 MMHG | HEART RATE: 100 BPM | RESPIRATION RATE: 16 BRPM

## 2018-03-16 LAB
ANION GAP SERPL CALC-SCNC: 11 MMOL/L — SIGNIFICANT CHANGE UP (ref 5–17)
APTT BLD: 64.3 SEC — HIGH (ref 27.5–37.4)
APTT BLD: 86.7 SEC — HIGH (ref 27.5–37.4)
BUN SERPL-MCNC: 16 MG/DL — SIGNIFICANT CHANGE UP (ref 7–23)
CALCIUM SERPL-MCNC: 9.8 MG/DL — SIGNIFICANT CHANGE UP (ref 8.4–10.5)
CHLORIDE SERPL-SCNC: 103 MMOL/L — SIGNIFICANT CHANGE UP (ref 96–108)
CO2 SERPL-SCNC: 24 MMOL/L — SIGNIFICANT CHANGE UP (ref 22–31)
CREAT SERPL-MCNC: 0.59 MG/DL — SIGNIFICANT CHANGE UP (ref 0.5–1.3)
ERYTHROCYTE [SEDIMENTATION RATE] IN BLOOD: 24 MM/HR — SIGNIFICANT CHANGE UP
GLUCOSE SERPL-MCNC: 113 MG/DL — HIGH (ref 70–99)
HCT VFR BLD CALC: 36.8 % — SIGNIFICANT CHANGE UP (ref 34.5–45)
HGB BLD-MCNC: 12 G/DL — SIGNIFICANT CHANGE UP (ref 11.5–15.5)
INR BLD: 1.14 — SIGNIFICANT CHANGE UP (ref 0.88–1.16)
MAGNESIUM SERPL-MCNC: 1.7 MG/DL — SIGNIFICANT CHANGE UP (ref 1.6–2.6)
MCHC RBC-ENTMCNC: 27.5 PG — SIGNIFICANT CHANGE UP (ref 27–34)
MCHC RBC-ENTMCNC: 32.6 G/DL — SIGNIFICANT CHANGE UP (ref 32–36)
MCV RBC AUTO: 84.2 FL — SIGNIFICANT CHANGE UP (ref 80–100)
PLATELET # BLD AUTO: 278 K/UL — SIGNIFICANT CHANGE UP (ref 150–400)
POTASSIUM SERPL-MCNC: 4.3 MMOL/L — SIGNIFICANT CHANGE UP (ref 3.5–5.3)
POTASSIUM SERPL-SCNC: 4.3 MMOL/L — SIGNIFICANT CHANGE UP (ref 3.5–5.3)
PROTHROM AB SERPL-ACNC: 12.7 SEC — SIGNIFICANT CHANGE UP (ref 9.8–12.7)
RBC # BLD: 4.37 M/UL — SIGNIFICANT CHANGE UP (ref 3.8–5.2)
RBC # FLD: 12.9 % — SIGNIFICANT CHANGE UP (ref 10.3–16.9)
SODIUM SERPL-SCNC: 138 MMOL/L — SIGNIFICANT CHANGE UP (ref 135–145)
WBC # BLD: 7 K/UL — SIGNIFICANT CHANGE UP (ref 3.8–10.5)
WBC # FLD AUTO: 7 K/UL — SIGNIFICANT CHANGE UP (ref 3.8–10.5)

## 2018-03-16 PROCEDURE — 84481 FREE ASSAY (FT-3): CPT

## 2018-03-16 PROCEDURE — 82550 ASSAY OF CK (CPK): CPT

## 2018-03-16 PROCEDURE — 85025 COMPLETE CBC W/AUTO DIFF WBC: CPT

## 2018-03-16 PROCEDURE — 80048 BASIC METABOLIC PNL TOTAL CA: CPT

## 2018-03-16 PROCEDURE — 93306 TTE W/DOPPLER COMPLETE: CPT

## 2018-03-16 PROCEDURE — 84439 ASSAY OF FREE THYROXINE: CPT

## 2018-03-16 PROCEDURE — 71045 X-RAY EXAM CHEST 1 VIEW: CPT

## 2018-03-16 PROCEDURE — 83735 ASSAY OF MAGNESIUM: CPT

## 2018-03-16 PROCEDURE — 84445 ASSAY OF TSI GLOBULIN: CPT

## 2018-03-16 PROCEDURE — 80053 COMPREHEN METABOLIC PANEL: CPT

## 2018-03-16 PROCEDURE — 84484 ASSAY OF TROPONIN QUANT: CPT

## 2018-03-16 PROCEDURE — 85379 FIBRIN DEGRADATION QUANT: CPT

## 2018-03-16 PROCEDURE — 85610 PROTHROMBIN TIME: CPT

## 2018-03-16 PROCEDURE — 76536 US EXAM OF HEAD AND NECK: CPT | Mod: 26

## 2018-03-16 PROCEDURE — 83036 HEMOGLOBIN GLYCOSYLATED A1C: CPT

## 2018-03-16 PROCEDURE — 83520 IMMUNOASSAY QUANT NOS NONAB: CPT

## 2018-03-16 PROCEDURE — 85027 COMPLETE CBC AUTOMATED: CPT

## 2018-03-16 PROCEDURE — 80061 LIPID PANEL: CPT

## 2018-03-16 PROCEDURE — 84443 ASSAY THYROID STIM HORMONE: CPT

## 2018-03-16 PROCEDURE — 86376 MICROSOMAL ANTIBODY EACH: CPT

## 2018-03-16 PROCEDURE — 85730 THROMBOPLASTIN TIME PARTIAL: CPT

## 2018-03-16 PROCEDURE — 82553 CREATINE MB FRACTION: CPT

## 2018-03-16 PROCEDURE — 99285 EMERGENCY DEPT VISIT HI MDM: CPT | Mod: 25

## 2018-03-16 PROCEDURE — 71275 CT ANGIOGRAPHY CHEST: CPT

## 2018-03-16 PROCEDURE — 84480 ASSAY TRIIODOTHYRONINE (T3): CPT

## 2018-03-16 PROCEDURE — 36415 COLL VENOUS BLD VENIPUNCTURE: CPT

## 2018-03-16 PROCEDURE — 93005 ELECTROCARDIOGRAM TRACING: CPT

## 2018-03-16 PROCEDURE — 76536 US EXAM OF HEAD AND NECK: CPT

## 2018-03-16 PROCEDURE — 85652 RBC SED RATE AUTOMATED: CPT

## 2018-03-16 PROCEDURE — 84436 ASSAY OF TOTAL THYROXINE: CPT

## 2018-03-16 PROCEDURE — 84442 ASSAY OF THYROID ACTIVITY: CPT

## 2018-03-16 PROCEDURE — 99232 SBSQ HOSP IP/OBS MODERATE 35: CPT | Mod: GC

## 2018-03-16 PROCEDURE — 99238 HOSP IP/OBS DSCHRG MGMT 30/<: CPT

## 2018-03-16 RX ORDER — BENZOCAINE AND MENTHOL 5; 1 G/100ML; G/100ML
1 LIQUID ORAL ONCE
Qty: 0 | Refills: 0 | Status: DISCONTINUED | OUTPATIENT
Start: 2018-03-16 | End: 2018-03-16

## 2018-03-16 RX ORDER — APIXABAN 2.5 MG/1
1 TABLET, FILM COATED ORAL
Qty: 60 | Refills: 2
Start: 2018-03-16 | End: 2018-06-13

## 2018-03-16 RX ORDER — RIVAROXABAN 15 MG-20MG
1 KIT ORAL
Qty: 30 | Refills: 2 | OUTPATIENT
Start: 2018-03-16 | End: 2018-06-13

## 2018-03-16 RX ORDER — METHIMAZOLE 10 MG/1
1 TABLET ORAL
Qty: 30 | Refills: 0
Start: 2018-03-16 | End: 2018-04-14

## 2018-03-16 RX ORDER — MAGNESIUM OXIDE 400 MG ORAL TABLET 241.3 MG
800 TABLET ORAL ONCE
Qty: 0 | Refills: 0 | Status: COMPLETED | OUTPATIENT
Start: 2018-03-16 | End: 2018-03-16

## 2018-03-16 RX ORDER — APIXABAN 2.5 MG/1
5 TABLET, FILM COATED ORAL ONCE
Qty: 0 | Refills: 0 | Status: COMPLETED | OUTPATIENT
Start: 2018-03-16 | End: 2018-03-16

## 2018-03-16 RX ORDER — METOPROLOL TARTRATE 50 MG
1 TABLET ORAL
Qty: 30 | Refills: 2
Start: 2018-03-16 | End: 2018-06-13

## 2018-03-16 RX ADMIN — Medication 20 MILLIGRAM(S): at 13:47

## 2018-03-16 RX ADMIN — APIXABAN 5 MILLIGRAM(S): 2.5 TABLET, FILM COATED ORAL at 18:36

## 2018-03-16 RX ADMIN — HEPARIN SODIUM 1300 UNIT(S)/HR: 5000 INJECTION INTRAVENOUS; SUBCUTANEOUS at 02:25

## 2018-03-16 RX ADMIN — MAGNESIUM OXIDE 400 MG ORAL TABLET 800 MILLIGRAM(S): 241.3 TABLET ORAL at 07:25

## 2018-03-16 RX ADMIN — HEPARIN SODIUM 1300 UNIT(S)/HR: 5000 INJECTION INTRAVENOUS; SUBCUTANEOUS at 13:49

## 2018-03-16 RX ADMIN — PANTOPRAZOLE SODIUM 40 MILLIGRAM(S): 20 TABLET, DELAYED RELEASE ORAL at 06:16

## 2018-03-16 RX ADMIN — Medication 20 MILLIGRAM(S): at 06:16

## 2018-03-16 RX ADMIN — NYSTATIN CREAM 1 APPLICATION(S): 100000 CREAM TOPICAL at 06:16

## 2018-03-16 NOTE — DISCHARGE NOTE ADULT - CARE PROVIDERS DIRECT ADDRESSES
normal...
[email or web address]

## 2018-03-16 NOTE — DISCHARGE NOTE ADULT - ADDITIONAL INSTRUCTIONS
YOU MUST FOLLOW UP WITH DR. DESIR CARDIOLOGIST NEXT WEEK OR THE WEEK AFTER.  YOU MUST FOLLOW UP WITH DR. PFEIFFER ENDOCRINOLOGIST (THYROID DOCTOR) NEXT WEEK OR THE WEEK AFTER.  Dr. Lemus is an electrophysiologist or atrial fibrillation doctor that you can see in the future.    You need to see these doctors in order to regulate and adjust your medication. If you do not follow up, you are at risk of the medications not working as directed which can lead to death. Please return to the hospital if symptoms worsen including not limited to chest pain, shortness of breath, palpitations. YOU MUST FOLLOW UP WITH DR. DESIR CARDIOLOGIST NEXT WEEK OR THE WEEK AFTER.  YOU MUST FOLLOW UP WITH DR. PFEIFFER ENDOCRINOLOGIST (THYROID DOCTOR) NEXT WEEK OR THE WEEK AFTER.  Dr. Lemus is an electrophysiologist or atrial fibrillation doctor that you can see in the future.    You need to see these doctors in order to regulate and adjust your medication. If you do not follow up, you are at risk of the medications not working as directed which can lead to death. Please return to the hospital if symptoms worsen including not limited to chest pain, shortness of breath, palpitations.  All of your prescribed medications are at the pharmacy of your choice, [employer], and you were provided with a coupon for Eliquis so that the medication is free without copay.

## 2018-03-16 NOTE — DISCHARGE NOTE ADULT - PLAN OF CARE
You were brought into the hospital for a rapid irregular heart beat. This happened because of your hyperthyroidism. Atrial fibrillation is a rapid irregular heart beat that can put you at risk for blood clots and stroke. You MUST take a blood thinner to prevent stroke. Please take Xarelto (Rivaroxaban) 20 mg orally daily to prevent stroke. You should take this medication with food as it may irritate the stomach. If you miss a dose of this you are at risk of blood clot, stroke, and possibly death. Please alert your physician on any bleeding you experience or dark, tarry stools. Please also take Metoprolol (Toprol) 50 mg orally daily to control your heart rate. It is vital that your heart rate remains normal and not too fast because you can go into heart failure from this. You will need to follow up with Dr. Marroquin next week. You were seen by endocrinology or the thyroid doctors. From your blood work it is evident that you have an autoimmune condition either Grave's disease or Hashimotos thyroiditis. START METHIMAZOLE 10 MG ORALLY DAILY.   We need to start you on a medication to help regulate your thyroid. We are giving you a month supply but you MUST FOLLOW UP WITH ENDOCRINOLOGIST. Dr. Reyna Roman [phone]. Please make an appointment as soon as possible. Atrial fibrillation is a rapid irregular heart beat that can put you at risk for blood clots and stroke. You MUST take a blood thinner to prevent stroke. Please take Eliquis (Apixaban) 5 mg orally twice daily to prevent stroke. You should take this medication with food as it may irritate the stomach. If you miss a dose of this you are at risk of blood clot, stroke, and possibly death. Please alert your physician on any bleeding you experience or dark, tarry stools. Please also take Metoprolol (Toprol) 50 mg orally daily to control your heart rate. It is vital that your heart rate remains normal and not too fast because you can go into heart failure from this. You will need to follow up with Dr. Marroquin next week.

## 2018-03-16 NOTE — PROGRESS NOTE ADULT - SUBJECTIVE AND OBJECTIVE BOX
INTERVAL HPI/OVERNIGHT EVENTS:    Patient is a 58y old  Female who presents with a chief complaint of sent from NP/PMD office for a new onset A fib (14 Mar 2018 19:30)      Pt reports the following symptoms:    CONSTITUTIONAL:  Negative fever or chills, feels well, good appetite  EYES:  Negative  blurry vision or double vision  CARDIOVASCULAR:  Negative for chest pain or palpitations  RESPIRATORY:  Negative for cough, wheezing, or SOB   GASTROINTESTINAL:  Negative for nausea, vomiting, diarrhea, constipation, or abdominal pain  GENITOURINARY:  Negative frequency, urgency or dysuria  NEUROLOGIC:  No headache, confusion, dizziness, lightheadedness    MEDICATIONS  (STANDING):  BACItracin   Ointment 1 Application(s) Topical daily  heparin  Infusion.  Unit(s)/Hr (13 mL/Hr) IV Continuous <Continuous>  nystatin Powder 1 Application(s) Topical two times a day  pantoprazole    Tablet 40 milliGRAM(s) Oral before breakfast  propranolol 20 milliGRAM(s) Oral three times a day    MEDICATIONS  (PRN):  heparin  Injectable 6000 Unit(s) IV Push every 6 hours PRN For aPTT less than 40  heparin  Injectable 3000 Unit(s) IV Push every 6 hours PRN For aPTT between 40 - 57      PHYSICAL EXAM  Vital Signs Last 24 Hrs  T(C): 36.3 (16 Mar 2018 13:36), Max: 37.2 (16 Mar 2018 07:08)  T(F): 97.4 (16 Mar 2018 13:36), Max: 99 (16 Mar 2018 07:08)  HR: 100 (16 Mar 2018 13:47) (95 - 110)  BP: 134/82 (16 Mar 2018 13:47) (126/75 - 153/89)  BP(mean): --  RR: 16 (16 Mar 2018 13:47) (16 - 16)  SpO2: 98% (16 Mar 2018 13:47) (98% - 99%)    Constitutional: wn/wd in NAD.   HEENT: NCAT, MMM, OP clear, EOMI, no proptosis or lid retraction  Neck: no thyromegaly or palpable thyroid nodules   Respiratory: lungs CTAB.  Cardiovascular: regular rhythm, normal S1 and S2, no audible murmurs, no peripheral edema  GI: soft, NT/ND, no masses/HSM appreciated.  Neurology: no tremors, DTR 2+  Skin: no visible rashes/lesions  Psychiatric: AAO x 3, normal affect/mood.    LABS:                        12.0   7.0   )-----------( 278      ( 16 Mar 2018 05:44 )             36.8     03-16    138  |  103  |  16  ----------------------------<  113<H>  4.3   |  24  |  0.59    Ca    9.8      16 Mar 2018 05:44  Mg     1.7     03-16    TPro  7.5  /  Alb  4.0  /  TBili  0.2  /  DBili  x   /  AST  19  /  ALT  19  /  AlkPhos  80  03-14    PT/INR - ( 16 Mar 2018 00:45 )   PT: 12.7 sec;   INR: 1.14          PTT - ( 16 Mar 2018 11:01 )  PTT:86.7 sec    Thyroid Stimulating Hormone, Serum: <0.004 uIU/mL (03-15 @ 08:00)  Thyroid Stimulating Hormone, Serum: <0.004 uIU/mL (03-14 @ 17:06)      HbA1C: 5.3 % (03-15 @ 08:00)    CAPILLARY BLOOD GLUCOSE      Insulin Sliding Scale requirements X 24 Hours:    RADIOLOGY & ADDITIONAL TESTS:    A/P: 58y Female with history of DM type II presenting for       1.  DM -     Please continue           units lantus at bedtime  / in the morning and        units lispro with meals and lispro moderate / low dose sliding scale 4 times daily with meals and at bedtime.  Please continue consistent carbohydrate diet.      Goal FSG is   Will continue to monitor   For discharge, pt can continue    Pt can follow up at discharge with [facility] by calling  to make an appointment.   Will discuss case with     and update primary team INTERVAL HPI/OVERNIGHT EVENTS:    Patient's HR is between . She is still in afib. Her TPO ab came back elevated at 273. Her thyroid US shows a hypervascular and heterogenous thyroid.      Pt reports the following symptoms:    CONSTITUTIONAL:  Negative fever or chills, feels well, good appetite  EYES:  Negative  blurry vision or double vision  CARDIOVASCULAR:  Negative for chest pain or palpitations  RESPIRATORY:  Negative for cough, wheezing, or SOB   GASTROINTESTINAL:  Negative for nausea, vomiting, diarrhea, constipation, or abdominal pain  GENITOURINARY:  Negative frequency, urgency or dysuria  NEUROLOGIC:  No headache, confusion, dizziness, lightheadedness    MEDICATIONS  (STANDING):  BACItracin   Ointment 1 Application(s) Topical daily  heparin  Infusion.  Unit(s)/Hr (13 mL/Hr) IV Continuous <Continuous>  nystatin Powder 1 Application(s) Topical two times a day  pantoprazole    Tablet 40 milliGRAM(s) Oral before breakfast  propranolol 20 milliGRAM(s) Oral three times a day    MEDICATIONS  (PRN):  heparin  Injectable 6000 Unit(s) IV Push every 6 hours PRN For aPTT less than 40  heparin  Injectable 3000 Unit(s) IV Push every 6 hours PRN For aPTT between 40 - 57      PHYSICAL EXAM  Vital Signs Last 24 Hrs  T(C): 36.3 (16 Mar 2018 13:36), Max: 37.2 (16 Mar 2018 07:08)  T(F): 97.4 (16 Mar 2018 13:36), Max: 99 (16 Mar 2018 07:08)  HR: 100 (16 Mar 2018 13:47) (95 - 110)  BP: 134/82 (16 Mar 2018 13:47) (126/75 - 153/89)  BP(mean): --  RR: 16 (16 Mar 2018 13:47) (16 - 16)  SpO2: 98% (16 Mar 2018 13:47) (98% - 99%)    Constitutional: wn/wd in NAD.   HEENT: NCAT, MMM, OP clear, EOMI, no proptosis or lid retraction  Neck: no thyromegaly or palpable thyroid nodules   Respiratory: lungs CTAB.  Cardiovascular: regular rhythm, normal S1 and S2, no audible murmurs, no peripheral edema  GI: soft, NT/ND, no masses/HSM appreciated.  Neurology: no tremors, DTR 2+  Skin: no visible rashes/lesions  Psychiatric: AAO x 3, normal affect/mood.    LABS:                        12.0   7.0   )-----------( 278      ( 16 Mar 2018 05:44 )             36.8     03-16    138  |  103  |  16  ----------------------------<  113<H>  4.3   |  24  |  0.59    Ca    9.8      16 Mar 2018 05:44  Mg     1.7     03-16    TPro  7.5  /  Alb  4.0  /  TBili  0.2  /  DBili  x   /  AST  19  /  ALT  19  /  AlkPhos  80  03-14    PT/INR - ( 16 Mar 2018 00:45 )   PT: 12.7 sec;   INR: 1.14          PTT - ( 16 Mar 2018 11:01 )  PTT:86.7 sec    Thyroid Stimulating Hormone, Serum: <0.004 uIU/mL (03-15 @ 08:00)  Thyroid Stimulating Hormone, Serum: <0.004 uIU/mL (03-14 @ 17:06)      HbA1C: 5.3 % (03-15 @ 08:00)    CAPILLARY BLOOD GLUCOSE      Insulin Sliding Scale requirements X 24 Hours:    RADIOLOGY & ADDITIONAL TESTS:      A/P:57 yo F with no significant PMH presents from PCP office with new onset afib found to be hyperthyroid    1.  Hyperthyroidism- TPO antibodies are positive. 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 Will need to fu the TSI. For now will start Methimazole 10 mg QD. Patient was explained the possible etiologies but it was difficult for her to understand due to poor health literacy. We recommended highly that she follow an endocrinologist. Also to be continued on long acting beta-blocker per primary team.    Will continue to monitor       Pt can follow up at discharge with [facility] by calling  to make an appointment.   Will discuss case with Dr. Rincon  and update primary team

## 2018-03-16 NOTE — DISCHARGE NOTE ADULT - MEDICATION SUMMARY - MEDICATIONS TO TAKE
I will START or STAY ON the medications listed below when I get home from the hospital:    rivaroxaban 20 mg oral tablet  -- 1 tab(s) by mouth once a day (in the evening)   -- Check with your doctor before becoming pregnant.  It is very important that you take or use this exactly as directed.  Do not skip doses or discontinue unless directed by your doctor.  Obtain medical advice before taking any non-prescription drugs as some may affect the action of this medication.  Take with food.    -- Indication: For Atrial fibrillation with rapid ventricular response    methIMAzole 10 mg oral tablet  -- 1 tab(s) by mouth once a day   -- Do not take this drug if you are pregnant.  It is very important that you take or use this exactly as directed.  Do not skip doses or discontinue unless directed by your doctor.    -- Indication: For Hyperthyroidism without crisis    Toprol-XL 50 mg oral tablet, extended release  -- 1 tab(s) by mouth once a day   -- It is very important that you take or use this exactly as directed.  Do not skip doses or discontinue unless directed by your doctor.  May cause drowsiness.  Alcohol may intensify this effect.  Use care when operating dangerous machinery.  Some non-prescription drugs may aggravate your condition.  Read all labels carefully.  If a warning appears, check with your doctor before taking.  Swallow whole.  Do not crush.  Take with food or milk.  This drug may impair the ability to drive or operate machinery.  Use care until you become familiar with its effects.    -- Indication: For Atrial fibrillation with rapid ventricular response I will START or STAY ON the medications listed below when I get home from the hospital:    Eliquis 5 mg oral tablet  -- 1 tab(s) by mouth 2 times a day   -- Check with your doctor before becoming pregnant.  It is very important that you take or use this exactly as directed.  Do not skip doses or discontinue unless directed by your doctor.  Obtain medical advice before taking any non-prescription drugs as some may affect the action of this medication.    -- Indication: For Atrial fibrillation with rapid ventricular response    methIMAzole 10 mg oral tablet  -- 1 tab(s) by mouth once a day   -- Do not take this drug if you are pregnant.  It is very important that you take or use this exactly as directed.  Do not skip doses or discontinue unless directed by your doctor.    -- Indication: For Hyperthyroidism without crisis    Toprol-XL 50 mg oral tablet, extended release  -- 1 tab(s) by mouth once a day   -- It is very important that you take or use this exactly as directed.  Do not skip doses or discontinue unless directed by your doctor.  May cause drowsiness.  Alcohol may intensify this effect.  Use care when operating dangerous machinery.  Some non-prescription drugs may aggravate your condition.  Read all labels carefully.  If a warning appears, check with your doctor before taking.  Swallow whole.  Do not crush.  Take with food or milk.  This drug may impair the ability to drive or operate machinery.  Use care until you become familiar with its effects.    -- Indication: For Atrial fibrillation with rapid ventricular response

## 2018-03-16 NOTE — DISCHARGE NOTE ADULT - PATIENT PORTAL LINK FT
You can access the [facility] Patient Portal, offered by [facility], by registering with the following website: [email or web address]

## 2018-03-16 NOTE — DISCHARGE NOTE ADULT - HOSPITAL COURSE
59 y/o woman non smoker with no known PMH (on no meds at home) was sent from PMD/NP office to [facility] ED for a new onset A fib on office EKG -120.  In ED VS: 147/89, A fib 100-120, afebrile, RR 20, O2 sat 98% RA, troponin negative x 1, d-dimer elevated at  274, TSH low at 0.004.  Patient is awaiting Chest CTA in ED. No meds given in ED.  Patient is poor historian.  For the past several weeks with fatigue, exertional SOB, palpitations and abdominal cramps without change in bowel.  Admits to unintentional weight loss but cant say how much.  Denies any prior hx endocrine/thyroid problems, denies any prior hx arrhythmias.    On admission, pt r/o ACS, CTA negative for PE. Endocrine was consulted for hyperthyroidism. Pt was continued on heparin gtt therapeutically anticoagulated for atrial fibrillation (chadsvasc1) and rate controlled with propanolol. EP saw patient, recommended xarelto as otpt and DCCV once hyperthyroidism worked up. Echo performed wh3/15 revealed dilated LA, mild-mod MR, LVEF 55-60%, LV wall motion normal, mild concentric LVH. On 3/16 TPO antibodies came back elevated, and endocrinology suspecting graves thyroiditis as etiology. Patient was thoroughly educated on condition by endocrinology and cardiology PA for at least 30 minutes each and she demonstrates poor insight for her diagnosis. She does understand that she needs medical follow up and failure to follow up or take medication as directed can lead to death. her friend was present during discussion and states that she will reinforce her follow up upon discharge. Today, VSS (HR remained 90-100s in afib), labs WNL, and PE WNL (unchanged). Patient was seen and examined by Dr. Marroquin and endocrinology and instructed on the importance of follow up. she was instructed to return to the hospital or seek immediate medical attention if symptoms worsen including not limited to chest pain, shortness of breath, palpitations. Medications were prescribed to the preferred pharmacy for  and verified they were filled (Methimazole 10 mg orally daily, Xarelto 20 mg orally daily, Toprol 50 mg orally daily). Pt verbalizes understanding to  medications. She states that she understands she needs to follow up and take the medication but would not like to discuss her condition further.  She understands and verbalizes risk of no follow up and med noncompliance.   Discussed case thoroughly with Dr. Marroquin, endocrine, EP. 57 y/o woman non smoker with no known PMH (on no meds at home) was sent from PMD/NP office to [facility] ED for a new onset A fib on office EKG -120.  In ED VS: 147/89, A fib 100-120, afebrile, RR 20, O2 sat 98% RA, troponin negative x 1, d-dimer elevated at  274, TSH low at 0.004.  Patient is awaiting Chest CTA in ED. No meds given in ED.  Patient is poor historian.  For the past several weeks with fatigue, exertional SOB, palpitations and abdominal cramps without change in bowel.  Admits to unintentional weight loss but cant say how much.  Denies any prior hx endocrine/thyroid problems, denies any prior hx arrhythmias.    On admission, pt r/o ACS, CTA negative for PE. Endocrine was consulted for hyperthyroidism. Pt was continued on heparin gtt therapeutically anticoagulated for atrial fibrillation (chadsvasc1) and rate controlled with propanolol. EP saw patient, recommended AC as otpt and DCCV once hyperthyroidism worked up. Echo performed h3/15 revealed dilated LA, mild-mod MR, LVEF 55-60%, LV wall motion normal, mild concentric LVH. On 3/16 TPO antibodies came back elevated, and endocrinology suspecting graves thyroiditis as etiology. Patient was thoroughly educated on condition by endocrinology and cardiology PA for at least 30 minutes each and she demonstrates poor insight for her diagnosis. She does understand that she needs medical follow up and failure to follow up or take medication as directed can lead to death. her friend was present during discussion and states that she will reinforce her follow up upon discharge. Today, VSS (HR remained 90-100s in afib), labs WNL, and PE WNL (unchanged). Patient was seen and examined by Dr. Marroquin and endocrinology and instructed on the importance of follow up. she was instructed to return to the hospital or seek immediate medical attention if symptoms worsen including not limited to chest pain, shortness of breath, palpitations. Medications were prescribed to the preferred pharmacy for  and verified they were filled (Methimazole 10 mg orally daily, Eliquis 5 mg orally twice daily, Toprol 50 mg orally daily). Pt verbalizes understanding to  medications. She states that she understands she needs to follow up and take the medication but would not like to discuss her condition further.  She understands and verbalizes risk of no follow up and med noncompliance.   Discussed case thoroughly with Dr. Marroquin, endocrine, EP.      **Upon prescribing Xarelto *(preferred 2/2 once a day dosing) pharmacy reported 126 dollar copay. Also the same for Eliquis and Pradaxa. Eliquis 5 mg orally BID was prescribed (because we had coupons) and patient was provided with coupons to get the medication for free without copay. The pharmacy was alerted that she will be bringing in coupons and cancelled the xarelto. D/w attending. 59 y/o woman non smoker with no known PMH (on no meds at home) was sent from PMD/NP office to [facility] ED for a new onset A fib on office EKG -120.  In ED VS: 147/89, A fib 100-120, afebrile, RR 20, O2 sat 98% RA, troponin negative x 1, d-dimer elevated at  274, TSH low at 0.004.  Patient is awaiting Chest CTA in ED. No meds given in ED.  Patient is poor historian.  For the past several weeks with fatigue, exertional SOB, palpitations and abdominal cramps without change in bowel.  Admits to unintentional weight loss but cant say how much.  Denies any prior hx endocrine/thyroid problems, denies any prior hx arrhythmias.    On admission, pt r/o ACS, CTA negative for PE. Endocrine was consulted for hyperthyroidism. Pt was continued on heparin gtt therapeutically anticoagulated for atrial fibrillation (chadsvasc1) and rate controlled with propanolol. EP saw patient, recommended AC as otpt and DCCV once hyperthyroidism worked up. Echo performed h3/15 revealed dilated LA, mild-mod MR, LVEF 55-60%, LV wall motion normal, mild concentric LVH. On 3/16 TPO antibodies came back elevated, and endocrinology suspecting graves thyroiditis as etiology. Patient was thoroughly educated on condition by endocrinology and cardiology PA for at least 30 minutes each and she demonstrates poor insight for her diagnosis. She does understand that she needs medical follow up and failure to follow up or take medication as directed can lead to death. her friend was present during discussion and states that she will reinforce her follow up upon discharge. Today, VSS (HR remained 90-100s in afib), labs WNL, and PE WNL (unchanged). Patient was seen and examined by Dr. Marroquin and endocrinology and instructed on the importance of follow up. she was instructed to return to the hospital or seek immediate medical attention if symptoms worsen including not limited to chest pain, shortness of breath, palpitations. Medications were prescribed to the preferred pharmacy for  and verified they were filled (Methimazole 10 mg orally daily, Eliquis 5 mg orally twice daily, Toprol 50 mg orally daily). Pt verbalizes understanding to  medications. She states that she understands she needs to follow up and take the medication but would not like to discuss her condition further.  She understands and verbalizes risk of no follow up and med noncompliance.   Discussed case thoroughly with Dr. Marroquin, endocrine, EP.      **Upon prescribing Xarelto *(preferred 2/2 once a day dosing) pharmacy reported 126 dollar copay. Also the same for Eliquis and Pradaxa. Eliquis 5 mg orally BID was prescribed (because we had coupons) and patient was provided with coupons to get the medication for 10 dollar copay (given for 3 mo). The pharmacy was alerted that she will be bringing in coupons and cancelled the xarelto. D/w attending. 59 y/o woman non smoker with no known PMH (on no meds at home) was sent from PMD/NP office to [facility] ED for a new onset A fib on office EKG -120.  In ED VS: 147/89, A fib 100-120, afebrile, RR 20, O2 sat 98% RA, troponin negative x 1, d-dimer elevated at  274, TSH low at 0.004.  Patient is awaiting Chest CTA in ED. No meds given in ED.  Patient is poor historian.  For the past several weeks with fatigue, exertional SOB, palpitations and abdominal cramps without change in bowel.  Admits to unintentional weight loss but cant say how much.  Denies any prior hx endocrine/thyroid problems, denies any prior hx arrhythmias.    On admission, pt r/o ACS, CTA negative for PE. Endocrine was consulted for hyperthyroidism. Pt was continued on heparin gtt therapeutically anticoagulated for atrial fibrillation (chadsvasc1) and rate controlled with propanolol. EP saw patient, recommended AC as otpt and DCCV once hyperthyroidism worked up. Echo performed h3/15 revealed dilated LA, mild-mod MR, LVEF 55-60%, LV wall motion normal, mild concentric LVH. On 3/16 TPO antibodies came back elevated, and endocrinology suspecting graves thyroiditis as etiology. Patient was thoroughly educated on condition by endocrinology and cardiology PA for at least 30 minutes each and she demonstrates poor insight for her diagnosis. She does understand that she needs medical follow up and failure to follow up or take medication as directed can lead to death. her friend was present during discussion and states that she will reinforce her follow up upon discharge. Today, VSS (HR remained 90-100s in afib), labs WNL, and PE WNL (unchanged). Patient was seen and examined by Dr. Marroquin and endocrinology and instructed on the importance of follow up. she was instructed to return to the hospital or seek immediate medical attention if symptoms worsen including not limited to chest pain, shortness of breath, palpitations. Medications were prescribed to the preferred pharmacy for  and verified they were filled (Methimazole 10 mg orally daily, Eliquis 5 mg orally twice daily, Toprol 50 mg orally daily). Pt verbalizes understanding to  medications. She states that she understands she needs to follow up and take the medication but would not like to discuss her condition further.  She understands and verbalizes risk of no follow up and med noncompliance.   Discussed case thoroughly with Dr. Marroquin, endocrine, EP.      Thyroid US reviewed by kilo.     Thyroid + Parathyroid (03.16.18 @ 11:22) >  IMPRESSION:  1.  Mildly heterogeneous and hypervascular thyroid, which may represent   thyroiditis.  2.  0.8 cm solid nodule in the right lobe, not meeting criteria for FNA.       **Upon prescribing Xarelto *(preferred 2/2 once a day dosing) pharmacy reported 126 dollar copay. Also the same for Eliquis and Pradaxa. Eliquis 5 mg orally BID was prescribed (because we had coupons) and patient was provided with coupons to get the medication for 10 dollar copay (given for 3 mo). The pharmacy was alerted that she will be bringing in coupons and cancelled the xarelto. D/w attending.

## 2018-03-16 NOTE — PROGRESS NOTE ADULT - ATTENDING COMMENTS
Pt seen on rounds with Dr. Roman this afternoon.  Thyroid ultrasound shows a normal sized gland though with mildly heterogeneous echotexture, consistent with her positive TPO antibodies and Hashimoto's disease.  Despite the lack of a goiter, the positive autoimmune markers and the somewhat disproportionate elevation of T3 relative to T4 suggests that she more likely has Graves' disease than a thyroiditis.  Explained to the pt that we cannot be sure of her diagnosis, but would treat her empirically with methimazole.  Will use an initial dose of 10 mg/day.  Warned her re: possible  allergic reactions of rash or fever, as well as the rare side-effect of agranulocytosis (instructed her to call for fever/sore throat).  Strongly suggested that she see Dr. Roman for follow-up, explained that this she should not be managed by a PCP.  Propranolol is to be changed to metoprolol.

## 2018-03-16 NOTE — DISCHARGE NOTE ADULT - CARE PROVIDER_API CALL
Chente Marroquin), Internal Medicine  [address] [id number]  Phone: [phone]  Fax: [phone]    Dr. [facility]  Phone: [phone]  Fax: (   )    -    Janie Lemus), Cardiac Electrophysiology; Cardiovascular Disease; Internal Medicine  [address] [address]  Phone: [phone]  Fax: [phone]

## 2018-03-16 NOTE — DISCHARGE NOTE ADULT - PROVIDER TOKENS
TOKEN:'[id number]',FREE:[LAST:[Newaz],FIRST:[Dr. Orellana],PHONE:[phone]],FAX:[(   [phone]    -],ADDRESS:[[facility]]],TOKEN:'[id number]'

## 2018-03-16 NOTE — DISCHARGE NOTE ADULT - CARE PLAN
Principal Discharge DX:	Atrial fibrillation with rapid ventricular response  Goal:	You were brought into the hospital for a rapid irregular heart beat. This happened because of your hyperthyroidism.  Assessment and plan of treatment:	Atrial fibrillation is a rapid irregular heart beat that can put you at risk for blood clots and stroke. You MUST take a blood thinner to prevent stroke. Please take Xarelto (Rivaroxaban) 20 mg orally daily to prevent stroke. You should take this medication with food as it may irritate the stomach. If you miss a dose of this you are at risk of blood clot, stroke, and possibly death. Please alert your physician on any bleeding you experience or dark, tarry stools. Please also take Metoprolol (Toprol) 50 mg orally daily to control your heart rate. It is vital that your heart rate remains normal and not too fast because you can go into heart failure from this. You will need to follow up with Dr. Marroquin next week.  Secondary Diagnosis:	Hyperthyroidism without crisis  Goal:	You were seen by endocrinology or the thyroid doctors. From your blood work it is evident that you have an autoimmune condition either Grave's disease or Hashimotos thyroiditis.  Assessment and plan of treatment:	START METHIMAZOLE 10 MG ORALLY DAILY.   We need to start you on a medication to help regulate your thyroid. We are giving you a month supply but you MUST FOLLOW UP WITH ENDOCRINOLOGIST. Dr. Reyna Roman [phone]. Please make an appointment as soon as possible. Principal Discharge DX:	Atrial fibrillation with rapid ventricular response  Goal:	You were brought into the hospital for a rapid irregular heart beat. This happened because of your hyperthyroidism.  Assessment and plan of treatment:	Atrial fibrillation is a rapid irregular heart beat that can put you at risk for blood clots and stroke. You MUST take a blood thinner to prevent stroke. Please take Eliquis (Apixaban) 5 mg orally twice daily to prevent stroke. You should take this medication with food as it may irritate the stomach. If you miss a dose of this you are at risk of blood clot, stroke, and possibly death. Please alert your physician on any bleeding you experience or dark, tarry stools. Please also take Metoprolol (Toprol) 50 mg orally daily to control your heart rate. It is vital that your heart rate remains normal and not too fast because you can go into heart failure from this. You will need to follow up with Dr. Marroquin next week.  Secondary Diagnosis:	Hyperthyroidism without crisis  Goal:	You were seen by endocrinology or the thyroid doctors. From your blood work it is evident that you have an autoimmune condition either Grave's disease or Hashimotos thyroiditis.  Assessment and plan of treatment:	START METHIMAZOLE 10 MG ORALLY DAILY.   We need to start you on a medication to help regulate your thyroid. We are giving you a month supply but you MUST FOLLOW UP WITH ENDOCRINOLOGIST. Dr. Reyna Roman [phone]. Please make an appointment as soon as possible.

## 2018-03-17 LAB
TSH RECEP AB FLD-ACNC: 5.21 IU/L — HIGH (ref 0–1.75)
TSI ACT/NOR SER: 1.8 IU/L — HIGH (ref 0–0.55)

## 2018-03-20 DIAGNOSIS — I48.0 PAROXYSMAL ATRIAL FIBRILLATION: ICD-10-CM

## 2018-03-20 DIAGNOSIS — E05.90 THYROTOXICOSIS, UNSPECIFIED WITHOUT THYROTOXIC CRISIS OR STORM: ICD-10-CM

## 2018-04-05 ENCOUNTER — APPOINTMENT (OUTPATIENT)
Dept: HEART AND VASCULAR | Facility: CLINIC | Age: 58
End: 2018-04-05
Payer: COMMERCIAL

## 2018-04-05 ENCOUNTER — APPOINTMENT (OUTPATIENT)
Dept: ENDOCRINOLOGY | Facility: CLINIC | Age: 58
End: 2018-04-05
Payer: COMMERCIAL

## 2018-04-05 VITALS
HEART RATE: 83 BPM | DIASTOLIC BLOOD PRESSURE: 86 MMHG | SYSTOLIC BLOOD PRESSURE: 130 MMHG | BODY MASS INDEX: 33.06 KG/M2 | HEIGHT: 59 IN | WEIGHT: 164 LBS

## 2018-04-05 VITALS
OXYGEN SATURATION: 97 % | DIASTOLIC BLOOD PRESSURE: 90 MMHG | BODY MASS INDEX: 34.47 KG/M2 | WEIGHT: 162 LBS | HEART RATE: 68 BPM | TEMPERATURE: 98 F | HEIGHT: 57.48 IN | SYSTOLIC BLOOD PRESSURE: 120 MMHG

## 2018-04-05 PROCEDURE — 99205 OFFICE O/P NEW HI 60 MIN: CPT | Mod: 25

## 2018-04-05 PROCEDURE — 36415 COLL VENOUS BLD VENIPUNCTURE: CPT

## 2018-04-05 PROCEDURE — 93000 ELECTROCARDIOGRAM COMPLETE: CPT

## 2018-04-05 PROCEDURE — 99214 OFFICE O/P EST MOD 30 MIN: CPT | Mod: 25

## 2018-04-30 ENCOUNTER — APPOINTMENT (OUTPATIENT)
Dept: ENDOCRINOLOGY | Facility: CLINIC | Age: 58
End: 2018-04-30
Payer: COMMERCIAL

## 2018-04-30 VITALS
WEIGHT: 166 LBS | DIASTOLIC BLOOD PRESSURE: 94 MMHG | BODY MASS INDEX: 35.32 KG/M2 | HEART RATE: 81 BPM | SYSTOLIC BLOOD PRESSURE: 137 MMHG

## 2018-04-30 PROCEDURE — 99214 OFFICE O/P EST MOD 30 MIN: CPT

## 2018-05-01 LAB
ALBUMIN SERPL ELPH-MCNC: 4.1 G/DL
ALP BLD-CCNC: 88 U/L
ALT SERPL-CCNC: 12 U/L
ANION GAP SERPL CALC-SCNC: 14 MMOL/L
AST SERPL-CCNC: 16 U/L
BILIRUB SERPL-MCNC: 0.2 MG/DL
BUN SERPL-MCNC: 11 MG/DL
CALCIUM SERPL-MCNC: 10 MG/DL
CHLORIDE SERPL-SCNC: 103 MMOL/L
CO2 SERPL-SCNC: 25 MMOL/L
CREAT SERPL-MCNC: 0.71 MG/DL
GLUCOSE SERPL-MCNC: 80 MG/DL
POTASSIUM SERPL-SCNC: 3.6 MMOL/L
PROT SERPL-MCNC: 7.4 G/DL
SODIUM SERPL-SCNC: 142 MMOL/L
T3FREE SERPL-MCNC: 4.41 PG/ML
T4 FREE SERPL-MCNC: 1.3 NG/DL
TSH RECEPTOR AB: 7.42 IU/L
TSH SERPL-ACNC: <0.01 UIU/ML

## 2018-07-31 ENCOUNTER — APPOINTMENT (OUTPATIENT)
Dept: ENDOCRINOLOGY | Facility: CLINIC | Age: 58
End: 2018-07-31

## 2018-09-04 ENCOUNTER — APPOINTMENT (OUTPATIENT)
Dept: ENDOCRINOLOGY | Facility: CLINIC | Age: 58
End: 2018-09-04
Payer: COMMERCIAL

## 2018-09-04 VITALS — WEIGHT: 165 LBS | BODY MASS INDEX: 35.11 KG/M2 | HEIGHT: 57.48 IN

## 2018-09-04 PROCEDURE — 36415 COLL VENOUS BLD VENIPUNCTURE: CPT

## 2018-09-04 PROCEDURE — 99214 OFFICE O/P EST MOD 30 MIN: CPT | Mod: 25

## 2018-09-05 LAB
ALBUMIN SERPL ELPH-MCNC: 4.2 G/DL
ALP BLD-CCNC: 118 U/L
ALT SERPL-CCNC: 8 U/L
ANION GAP SERPL CALC-SCNC: 14 MMOL/L
AST SERPL-CCNC: 13 U/L
BASOPHILS # BLD AUTO: 0.01 K/UL
BASOPHILS NFR BLD AUTO: 0.1 %
BILIRUB SERPL-MCNC: 0.2 MG/DL
BUN SERPL-MCNC: 7 MG/DL
CALCIUM SERPL-MCNC: 9.9 MG/DL
CHLORIDE SERPL-SCNC: 102 MMOL/L
CO2 SERPL-SCNC: 28 MMOL/L
CREAT SERPL-MCNC: 0.6 MG/DL
EOSINOPHIL # BLD AUTO: 0.18 K/UL
EOSINOPHIL NFR BLD AUTO: 2.3 %
GLUCOSE SERPL-MCNC: 105 MG/DL
HCT VFR BLD CALC: 40 %
HGB BLD-MCNC: 13 G/DL
IMM GRANULOCYTES NFR BLD AUTO: 0.1 %
LYMPHOCYTES # BLD AUTO: 2.73 K/UL
LYMPHOCYTES NFR BLD AUTO: 34.7 %
MAN DIFF?: NORMAL
MCHC RBC-ENTMCNC: 29.4 PG
MCHC RBC-ENTMCNC: 32.5 GM/DL
MCV RBC AUTO: 90.5 FL
MONOCYTES # BLD AUTO: 0.67 K/UL
MONOCYTES NFR BLD AUTO: 8.5 %
NEUTROPHILS # BLD AUTO: 4.26 K/UL
NEUTROPHILS NFR BLD AUTO: 54.3 %
PLATELET # BLD AUTO: 287 K/UL
POTASSIUM SERPL-SCNC: 4 MMOL/L
PROT SERPL-MCNC: 7.1 G/DL
RBC # BLD: 4.42 M/UL
RBC # FLD: 13.2 %
SODIUM SERPL-SCNC: 144 MMOL/L
T3FREE SERPL-MCNC: 8.98 PG/ML
T4 FREE SERPL-MCNC: 1.9 NG/DL
TSH SERPL-ACNC: <0.01 UIU/ML
WBC # FLD AUTO: 7.86 K/UL

## 2018-09-26 ENCOUNTER — APPOINTMENT (OUTPATIENT)
Dept: HEART AND VASCULAR | Facility: CLINIC | Age: 58
End: 2018-09-26
Payer: COMMERCIAL

## 2018-09-26 VITALS
WEIGHT: 171.98 LBS | DIASTOLIC BLOOD PRESSURE: 103 MMHG | HEART RATE: 85 BPM | HEIGHT: 57.48 IN | TEMPERATURE: 97.6 F | BODY MASS INDEX: 36.6 KG/M2 | OXYGEN SATURATION: 99 % | SYSTOLIC BLOOD PRESSURE: 146 MMHG

## 2018-09-26 DIAGNOSIS — Z78.9 OTHER SPECIFIED HEALTH STATUS: ICD-10-CM

## 2018-09-26 DIAGNOSIS — Z72.3 LACK OF PHYSICAL EXERCISE: ICD-10-CM

## 2018-09-26 DIAGNOSIS — Z87.891 PERSONAL HISTORY OF NICOTINE DEPENDENCE: ICD-10-CM

## 2018-09-26 PROCEDURE — 99406 BEHAV CHNG SMOKING 3-10 MIN: CPT | Mod: 25

## 2018-09-26 PROCEDURE — 99214 OFFICE O/P EST MOD 30 MIN: CPT

## 2018-09-26 PROCEDURE — 93000 ELECTROCARDIOGRAM COMPLETE: CPT

## 2018-09-26 PROCEDURE — 99401 PREV MED CNSL INDIV APPRX 15: CPT | Mod: 25

## 2018-11-20 ENCOUNTER — APPOINTMENT (OUTPATIENT)
Dept: ENDOCRINOLOGY | Facility: CLINIC | Age: 58
End: 2018-11-20
Payer: COMMERCIAL

## 2018-11-20 VITALS
HEART RATE: 74 BPM | HEIGHT: 57.48 IN | WEIGHT: 181 LBS | BODY MASS INDEX: 38.52 KG/M2 | SYSTOLIC BLOOD PRESSURE: 129 MMHG | DIASTOLIC BLOOD PRESSURE: 96 MMHG

## 2018-11-20 PROCEDURE — 99213 OFFICE O/P EST LOW 20 MIN: CPT

## 2018-11-29 ENCOUNTER — RESULT CHARGE (OUTPATIENT)
Age: 58
End: 2018-11-29

## 2018-11-29 ENCOUNTER — OTHER (OUTPATIENT)
Age: 58
End: 2018-11-29

## 2018-11-29 LAB
HBA1C MFR BLD HPLC: 5.7 %
T3FREE SERPL-MCNC: 1.89 PG/ML
T4 FREE SERPL-MCNC: 0.3 NG/DL
TSH SERPL-ACNC: 44.67 UIU/ML

## 2018-12-07 ENCOUNTER — APPOINTMENT (OUTPATIENT)
Dept: ENDOCRINOLOGY | Facility: CLINIC | Age: 58
End: 2018-12-07
Payer: COMMERCIAL

## 2018-12-07 VITALS
DIASTOLIC BLOOD PRESSURE: 102 MMHG | HEIGHT: 57.48 IN | HEART RATE: 88 BPM | WEIGHT: 180 LBS | BODY MASS INDEX: 38.3 KG/M2 | SYSTOLIC BLOOD PRESSURE: 146 MMHG

## 2018-12-07 PROCEDURE — 99215 OFFICE O/P EST HI 40 MIN: CPT

## 2018-12-18 ENCOUNTER — APPOINTMENT (OUTPATIENT)
Dept: HEART AND VASCULAR | Facility: CLINIC | Age: 58
End: 2018-12-18
Payer: COMMERCIAL

## 2018-12-18 VITALS
HEIGHT: 59 IN | BODY MASS INDEX: 36.49 KG/M2 | TEMPERATURE: 97.8 F | OXYGEN SATURATION: 98 % | HEART RATE: 75 BPM | SYSTOLIC BLOOD PRESSURE: 124 MMHG | WEIGHT: 181 LBS | DIASTOLIC BLOOD PRESSURE: 84 MMHG

## 2018-12-18 DIAGNOSIS — Z87.891 PERSONAL HISTORY OF NICOTINE DEPENDENCE: ICD-10-CM

## 2018-12-18 DIAGNOSIS — Z78.9 OTHER SPECIFIED HEALTH STATUS: ICD-10-CM

## 2018-12-18 PROCEDURE — 99214 OFFICE O/P EST MOD 30 MIN: CPT

## 2018-12-18 PROCEDURE — 93000 ELECTROCARDIOGRAM COMPLETE: CPT

## 2018-12-18 NOTE — HISTORY OF PRESENT ILLNESS
[FreeTextEntry1] : 59 y/o woman non smoker with no known PMH (on no meds at home) was sent from \par PMD/NP office to [facility] ED for a new onset A fib on office EKG -120.  In ED \par VS: 147/89, A fib 100-120, afebrile, RR 20, O2 sat 98% RA, troponin negative x \par 1, d-dimer elevated at  274, TSH low at 0.004.  Patient is awaiting Chest CTA \par in ED. No meds given in ED. \par Patient is poor historian.  For the past several weeks with fatigue, exertional \par SOB, palpitations and abdominal cramps without change in bowel.  Admits to \par unintentional weight loss but cant say how much.  Denies any prior hx \par endocrine/thyroid problems, denies any prior hx arrhythmias. \par \par On admission, pt r/o ACS, CTA negative for PE. Endocrine was consulted for \par hyperthyroidism. Pt was continued on heparin gtt therapeutically anticoagulated \par for atrial fibrillation (chadsvasc1) and rate controlled with propanolol. EP \par saw patient, recommended AC as otpt and DCCV once hyperthyroidism worked up. \par Echo performed h3/15 revealed dilated LA, mild-mod MR, LVEF 55-60%, LV wall \par motion normal, mild concentric LVH. On 3/16 TPO antibodies came back elevated, \par and endocrinology suspecting graves thyroiditis as etiology. Patient was \par thoroughly educated on condition by endocrinology and cardiology PA for at \par least 30 minutes each and she demonstrates poor insight for her diagnosis. She \par does understand that she needs medical follow up and failure to follow up or \par take medication as directed can lead to death. her friend was present during \par discussion and states that she will reinforce her follow up upon discharge. \par Today, VSS (HR remained 90-100s in afib), labs WNL, and PE WNL (unchanged). \par Patient was seen and examined by Dr. Marroquin and endocrinology and instructed on \par the importance of follow up. she was instructed to return to the hospital or \par seek immediate medical attention if symptoms worsen including not limited to \par chest pain, shortness of breath, palpitations. Medications were prescribed to \par the preferred pharmacy for  and verified they were filled (Methimazole \par 10 mg orally daily, Eliquis 5 mg orally twice daily, Toprol 50 mg orally \par daily). Pt verbalizes understanding to  medications. She states that she \par understands she needs to follow up and take the medication but would not like \par to discuss her condition further.  She understands and verbalizes risk of no \par follow up and med noncompliance. \par Discussed case thoroughly with Dr. Marroquin, endocrine, EP. \par \par Thyroid US reviewed by endo. \par  US Thyroid + Parathyroid (03.16.18 @ 11:22) > \par IMPRESSION: \par 1.  Mildly heterogeneous and hypervascular thyroid, which may represent \par thyroiditis. \par 2.  0.8 cm solid nodule in the right lobe, not meeting criteria for FNA. \par \par \par **Upon prescribing Xarelto *(preferred 2/2 once a day dosing) pharmacy reported \par 126 dollar copay. Also the same for Eliquis and Pradaxa. Eliquis 5 mg orally \par BID was prescribed (because we had coupons) and patient was provided with \par coupons to get the medication for 10 dollar copay (given for 3 mo). The \par pharmacy was alerted that she will be bringing in coupons and cancelled the \par xarelto.\par \par 9/26/18 saw another cardio who dec pt eliquis to 2.5bid, pt here for second opinion.\par ekg rate controlled afib'\par endo recs appreciated\par labs reviewed - pt is no <60kg, normal GFr, and no hx of cva - there is no relative or absolute contraindication for low dose eliquis\par \par 12/18/18 maintained on ful dose eliquis and MTP succinate - HR at goal ekg un changed\par no palp syncope cp sob + weight gain\par

## 2018-12-18 NOTE — PHYSICAL EXAM
[General Appearance - Well Developed] : well developed [Normal Appearance] : normal appearance [Well Groomed] : well groomed [General Appearance - Well Nourished] : well nourished [No Deformities] : no deformities [General Appearance - In No Acute Distress] : no acute distress [Normal Jugular Venous A Waves Present] : normal jugular venous A waves present [Normal Jugular Venous V Waves Present] : normal jugular venous V waves present [No Jugular Venous Mora A Waves] : no jugular venous mora A waves [Respiration, Rhythm And Depth] : normal respiratory rhythm and effort [Exaggerated Use Of Accessory Muscles For Inspiration] : no accessory muscle use [Auscultation Breath Sounds / Voice Sounds] : lungs were clear to auscultation bilaterally [Heart Rate And Rhythm] : heart rate and rhythm were normal [Heart Sounds] : normal S1 and S2 [Murmurs] : no murmurs present [Nail Clubbing] : no clubbing of the fingernails [Cyanosis, Localized] : no localized cyanosis [Petechial Hemorrhages (___cm)] : no petechial hemorrhages [] : no ischemic changes

## 2018-12-18 NOTE — DISCUSSION/SUMMARY
[FreeTextEntry1] : afib - ratecontrolled on MTP and eliquis - ep consult for possible ablation, repeat echo to eval for SHD\par EKG afib 60 no nonspecific st changes unchanged from inpt\par \par hyperthyroid - on methimazole f/u endo\par \par

## 2018-12-20 LAB
ALBUMIN SERPL ELPH-MCNC: 4.5 G/DL
ALP BLD-CCNC: 142 U/L
ALT SERPL-CCNC: 15 U/L
ANION GAP SERPL CALC-SCNC: 13 MMOL/L
AST SERPL-CCNC: 17 U/L
BASOPHILS # BLD AUTO: 0.04 K/UL
BASOPHILS NFR BLD AUTO: 0.5 %
BILIRUB SERPL-MCNC: 0.3 MG/DL
BUN SERPL-MCNC: 11 MG/DL
CALCIUM SERPL-MCNC: 9.3 MG/DL
CHLORIDE SERPL-SCNC: 102 MMOL/L
CO2 SERPL-SCNC: 25 MMOL/L
CREAT SERPL-MCNC: 0.97 MG/DL
EOSINOPHIL # BLD AUTO: 0.24 K/UL
EOSINOPHIL NFR BLD AUTO: 2.9 %
GLUCOSE SERPL-MCNC: 85 MG/DL
HCT VFR BLD CALC: 41.1 %
HGB BLD-MCNC: 13.4 G/DL
IMM GRANULOCYTES NFR BLD AUTO: 0.2 %
LYMPHOCYTES # BLD AUTO: 3.03 K/UL
LYMPHOCYTES NFR BLD AUTO: 36.1 %
MAN DIFF?: NORMAL
MCHC RBC-ENTMCNC: 28.6 PG
MCHC RBC-ENTMCNC: 32.6 GM/DL
MCV RBC AUTO: 87.8 FL
MONOCYTES # BLD AUTO: 0.55 K/UL
MONOCYTES NFR BLD AUTO: 6.5 %
NEUTROPHILS # BLD AUTO: 4.52 K/UL
NEUTROPHILS NFR BLD AUTO: 53.8 %
PLATELET # BLD AUTO: 315 K/UL
POTASSIUM SERPL-SCNC: 3.8 MMOL/L
PROT SERPL-MCNC: 7.4 G/DL
RBC # BLD: 4.68 M/UL
RBC # FLD: 15.7 %
SODIUM SERPL-SCNC: 140 MMOL/L
T3FREE SERPL-MCNC: 1.62 PG/ML
T4 FREE SERPL-MCNC: 0.2 NG/DL
TSH SERPL-ACNC: 58.79 UIU/ML
WBC # FLD AUTO: 8.4 K/UL

## 2018-12-24 ENCOUNTER — OTHER (OUTPATIENT)
Age: 58
End: 2018-12-24

## 2019-01-02 ENCOUNTER — OTHER (OUTPATIENT)
Age: 59
End: 2019-01-02

## 2019-01-03 ENCOUNTER — OTHER (OUTPATIENT)
Age: 59
End: 2019-01-03

## 2019-01-07 ENCOUNTER — APPOINTMENT (OUTPATIENT)
Dept: ENDOCRINOLOGY | Facility: CLINIC | Age: 59
End: 2019-01-07
Payer: COMMERCIAL

## 2019-01-07 ENCOUNTER — NON-APPOINTMENT (OUTPATIENT)
Age: 59
End: 2019-01-07

## 2019-01-07 VITALS
DIASTOLIC BLOOD PRESSURE: 106 MMHG | HEART RATE: 105 BPM | WEIGHT: 175 LBS | SYSTOLIC BLOOD PRESSURE: 175 MMHG | BODY MASS INDEX: 35.28 KG/M2 | HEIGHT: 59 IN

## 2019-01-07 PROCEDURE — 93000 ELECTROCARDIOGRAM COMPLETE: CPT

## 2019-01-07 PROCEDURE — 99215 OFFICE O/P EST HI 40 MIN: CPT | Mod: 25

## 2019-01-08 NOTE — ASSESSMENT
[FreeTextEntry1] : 57 y/o with recent \par #1- Graves' disease, and atrial fibrillation.\par Off Methimazole longer than recommended\par She is not taking metoprolol for ~ 1 week\par She is losing weight and has symptoms of hyperthyroidism including hand tremors\par EKG confirm a-Fib\par Spoke with her cardiologist, Dr. Marroquin\par Patient and her son explained the need to reinitiate beta blockers ( was called IT in again), and Methimazole .\par Stat TFT sent\par \par \par Ordered Iodine 123 uptake and scan\par Labs today and contact patient with new MMI dose\par Above was explained in detail to patient and her son and daughter Mr. Hinojosa, and Ms. Thomas\par \par #2- Obesity and elevated A1c\par Patient explained that  she is at increased risk to develop diabetes\par Benefit weight reduction explained\par \par March 15 TSH < 0.004 and T4 free 2.09, TSH receptor antibody 5.21.\par April 5,2018 t4, and t3 free , both normal\par End of August 2018, methimazole has been increased to 30 mg daily.\par Sept 4,2018 tsh 0.01\par She is not clinically hyperthyroid\par Patient explained, option s of treatment , she said that that she understood\par Thyroid function test today\par Patient can bring her family to next appt\par \par # 2- obesity, gained 16 pounds since 4,2018\par Benefits of weight loss explained\par Send metabolic and A1c \par \par Return in 2 months [Methimazole Therapy] : Risks and benefits of methimazole therapy were discussed with the patient,  including rash, liver dysfunction, and agranulocytosis.  Patient was instructed to call the office for flu-like symptoms eg fever and sore-throat

## 2019-01-08 NOTE — PHYSICAL EXAM
[Alert] : alert [Normal Sclera/Conjunctiva] : normal sclera/conjunctiva [Normal Outer Ear/Nose] : the ears and nose were normal in appearance [No Neck Mass] : no neck mass was observed [Normal Bowel Sounds] : normal bowel sounds [No Stigmata of Cushings Syndrome] : no stigmata of cushings syndrome [Normal Gait] : normal gait [No Rash] : no rash [Cranial Nerves Intact] : cranial nerves 2-12 were intact [Oriented x3] : oriented to person, place, and time [No Edema] : there was no peripheral edema [de-identified] : goiter, no bruit [de-identified] : Scattered wheezing [de-identified] : Irreg heart rate [de-identified] : hand tremors

## 2019-01-08 NOTE — REVIEW OF SYSTEMS
[Nasal Congestion] : nasal congestion [Cough] : cough [Fatigue] : fatigue [Palpitations] : no palpitations [Wheezing] : no wheezing was heard [Polyuria] : no polyuria [Joint Pain] : joint pain [Cold Intolerance] : cold tolerant [Heat Intolerance] : heat tolerant [Negative] : Integumentary

## 2019-01-08 NOTE — HISTORY OF PRESENT ILLNESS
[FreeTextEntry1] : Miss 12/28/18 Richard\par 58 y.o F hospitalized on March 15, 2018 with GI symptoms and HTN. Diagnosed Graves' disease, atrial fibrillation, rapid ventricular response. Prescribed Methimazole \par On March 15, 2018: TSH < 0.004 and T4 free 2.09, TSH receptor antibody 5.21\par 3/16/18 Thyroid u/s : Right thyroid nodule\par At her Nov visit, She was not sure what medicines She was taking.\par At  December 7 th visit, presents with her 2 grown up children. Bought all her medicines. She was feeling better\par December 4th,  MMI held ( low T4 free)\par December 7,2018 T4 free 0.2\par 4/5/18, TSH 6.6, T4 free 1.3, T3 free 4.41, TSH r ab 7.42\par 11/20/18 TSH 44.6, T4 free 0.3\par She is not on Methimazole, she miss placed metoprolol ER 50 mg qd rx by cardiologist\par She stated that she is feeling well, no complaints\par Denies palpitations, weight loss, headaches, chest pain\par November 20, 2018 181 pounds, today 175 pounds\par \par \par

## 2019-01-09 ENCOUNTER — OTHER (OUTPATIENT)
Age: 59
End: 2019-01-09

## 2019-01-24 ENCOUNTER — RX RENEWAL (OUTPATIENT)
Age: 59
End: 2019-01-24

## 2019-01-24 LAB
ALBUMIN SERPL ELPH-MCNC: 3.9 G/DL
ALP BLD-CCNC: 100 U/L
ALT SERPL-CCNC: 21 U/L
ANION GAP SERPL CALC-SCNC: 11 MMOL/L
AST SERPL-CCNC: 18 U/L
BILIRUB SERPL-MCNC: 0.2 MG/DL
BUN SERPL-MCNC: 7 MG/DL
CALCIUM SERPL-MCNC: 9.6 MG/DL
CHLORIDE SERPL-SCNC: 105 MMOL/L
CO2 SERPL-SCNC: 26 MMOL/L
CREAT SERPL-MCNC: 0.59 MG/DL
GLUCOSE SERPL-MCNC: 101 MG/DL
POTASSIUM SERPL-SCNC: 4.1 MMOL/L
PROT SERPL-MCNC: 6.8 G/DL
SODIUM SERPL-SCNC: 142 MMOL/L
T3FREE SERPL-MCNC: 14.61 PG/ML
T4 FREE SERPL-MCNC: 2.5 NG/DL
TSH SERPL-ACNC: 0.01 UIU/ML

## 2019-02-06 ENCOUNTER — APPOINTMENT (OUTPATIENT)
Dept: ENDOCRINOLOGY | Facility: CLINIC | Age: 59
End: 2019-02-06
Payer: COMMERCIAL

## 2019-02-06 VITALS
HEART RATE: 73 BPM | HEIGHT: 59 IN | WEIGHT: 173 LBS | SYSTOLIC BLOOD PRESSURE: 141 MMHG | BODY MASS INDEX: 34.88 KG/M2 | DIASTOLIC BLOOD PRESSURE: 94 MMHG

## 2019-02-06 PROCEDURE — 99214 OFFICE O/P EST MOD 30 MIN: CPT

## 2019-02-07 ENCOUNTER — RX RENEWAL (OUTPATIENT)
Age: 59
End: 2019-02-07

## 2019-02-07 NOTE — END OF VISIT
[>50% of Time Spent on Counseling for ____] : Greater than 50% of the encounter time was spent on counseling for [unfilled] [Time Spent: ___ minutes] : I have spent [unfilled] minutes of face to face time with the patient [FreeTextEntry3] : This note was authored by Milton Weinstein, working as a medical scribe for Dr. Kb RODRIGUEZ). The note was reviewed, edited and revised by Dr. Dodge, who is in agreement with the exam findings, imaging findings and treatment plan. 02/06/2019.  3:00PM.

## 2019-02-07 NOTE — PHYSICAL EXAM
[Alert] : alert [Normal Sclera/Conjunctiva] : normal sclera/conjunctiva [Normal Outer Ear/Nose] : the ears and nose were normal in appearance [No Neck Mass] : no neck mass was observed [No Respiratory Distress] : no respiratory distress [No Edema] : there was no peripheral edema [Normal Bowel Sounds] : normal bowel sounds [No Stigmata of Cushings Syndrome] : no stigmata of cushings syndrome [Normal Gait] : normal gait [No Rash] : no rash [Cranial Nerves Intact] : cranial nerves 2-12 were intact [Oriented x3] : oriented to person, place, and time [de-identified] : goiter, no bruit [de-identified] : Irreg heart rate

## 2019-02-07 NOTE — REVIEW OF SYSTEMS
[Negative] : Psychiatric [Palpitations] : no palpitations [Cold Intolerance] : cold tolerant [Heat Intolerance] : heat tolerant

## 2019-02-07 NOTE — HISTORY OF PRESENT ILLNESS
[FreeTextEntry1] : 60 y/o F pt with grave's disease considering radioiodine ablation (spoke with family members about this during pt's last visit) \par I had conversation with her NP Ms. Bynum last week.\par \par  Pt is feeling well today with no complaints. Pt denies palpitations and weight loss. \par She stated that she is taking her medicnes as prescribed\par \par Labs on 1/7/2019: free T3 14.61, free T4 2.5, TSH 0.01 \par Labs on 1/31/2019: free T3 3.3, free T4 0.9, TSH 0.02\par \par Medications: Eliquis BID, Metoprolol 50 mg QD, Methimazole 10 mg TID

## 2019-02-07 NOTE — ASSESSMENT
[Methimazole Therapy] : Risks and benefits of methimazole therapy were discussed with the patient,  including rash, liver dysfunction, and agranulocytosis.  Patient was instructed to call the office for flu-like symptoms eg fever and sore-throat [FreeTextEntry1] : 60 y/o F pt with hyperthyroidism. Pt has a Hx of noncompliance which puts her at an increased risk for severe hyperthyroidism. This has been discuss with both the patient and pt's family (son and daughter). Again, today, spoke to Ashish, pt's son to go over thee next part of her treatment;radioiodine ablation\par \par Pt is being referred to nuclear medicine for I 123 uptake scan and ablation. Pt and family members given instructions to stop Methimazole 4 days before scan and ablation. \par Labs today\par Return in 2 months.

## 2019-02-13 ENCOUNTER — OTHER (OUTPATIENT)
Age: 59
End: 2019-02-13

## 2019-02-15 ENCOUNTER — OTHER (OUTPATIENT)
Age: 59
End: 2019-02-15

## 2019-02-15 ENCOUNTER — RESULT CHARGE (OUTPATIENT)
Age: 59
End: 2019-02-15

## 2019-02-25 LAB
T3FREE SERPL-MCNC: 2.29 PG/ML
T4 FREE SERPL-MCNC: 0.4 NG/DL
TSH SERPL-ACNC: 6.89 UIU/ML

## 2019-03-15 ENCOUNTER — OTHER (OUTPATIENT)
Age: 59
End: 2019-03-15

## 2019-03-20 ENCOUNTER — FORM ENCOUNTER (OUTPATIENT)
Age: 59
End: 2019-03-20

## 2019-03-20 ENCOUNTER — OUTPATIENT (OUTPATIENT)
Dept: OUTPATIENT SERVICES | Facility: HOSPITAL | Age: 59
LOS: 1 days | End: 2019-03-20
Payer: COMMERCIAL

## 2019-03-21 PROCEDURE — A9516: CPT

## 2019-03-21 PROCEDURE — 78014 THYROID IMAGING W/BLOOD FLOW: CPT | Mod: 26

## 2019-03-21 PROCEDURE — 78014 THYROID IMAGING W/BLOOD FLOW: CPT

## 2019-03-24 ENCOUNTER — FORM ENCOUNTER (OUTPATIENT)
Age: 59
End: 2019-03-24

## 2019-03-25 ENCOUNTER — OUTPATIENT (OUTPATIENT)
Dept: OUTPATIENT SERVICES | Facility: HOSPITAL | Age: 59
LOS: 1 days | End: 2019-03-25
Payer: COMMERCIAL

## 2019-03-25 PROCEDURE — 79005 NUCLEAR RX ORAL ADMIN: CPT

## 2019-03-25 PROCEDURE — A9517: CPT

## 2019-03-25 PROCEDURE — 79005 NUCLEAR RX ORAL ADMIN: CPT | Mod: 26

## 2019-04-09 ENCOUNTER — APPOINTMENT (OUTPATIENT)
Dept: ENDOCRINOLOGY | Facility: CLINIC | Age: 59
End: 2019-04-09
Payer: COMMERCIAL

## 2019-04-09 VITALS
BODY MASS INDEX: 35.88 KG/M2 | SYSTOLIC BLOOD PRESSURE: 165 MMHG | HEART RATE: 76 BPM | WEIGHT: 178 LBS | HEIGHT: 59 IN | DIASTOLIC BLOOD PRESSURE: 116 MMHG

## 2019-04-09 PROCEDURE — 99214 OFFICE O/P EST MOD 30 MIN: CPT

## 2019-04-10 NOTE — END OF VISIT
[Time Spent: ___ minutes] : I have spent [unfilled] minutes of face to face time with the patient [FreeTextEntry3] : All medical record entries made by the Scribe were at my, Dr. Kb Dodge, direction and personally dictated by me on 04/09/2019. I have reviewed the chart and agree that the record accurately reflects my personal performance of the history, physical exam, assessment and plan. I have also personally directed, reviewed and agreed with the chart.\par

## 2019-04-10 NOTE — ASSESSMENT
[FreeTextEntry1] : 60 y/o F pt with:\par 1. S/P Radioiodine ablation, clinically improve\par Restart methimazole at 10 mg qd\par Patient explained need to monitor tft and initiation of  thyroid supplementation upon signs/ symptoms og hypothyroidism\par Writing instruction given to patient\par \par F/U in 6 weeks.  [Methimazole Therapy] : Risks and benefits of methimazole therapy were discussed with the patient,  including rash, liver dysfunction, and agranulocytosis.  Patient was instructed to call the office for flu-like symptoms eg fever and sore-throat

## 2019-04-10 NOTE — HISTORY OF PRESENT ILLNESS
[FreeTextEntry1] : 1/7/19: free T3 14.61, free T4 2.5, TSH 0.01\par 1/31/19: free T3 3.3, free T4 0.9, TSH 0.02\par 2/14/19: Free T3 2.29, Free T4 0.4, TSH 6.89\par \par 58 y/o F pt with Hx of Grave's Dz, s/p MEANS ablation 3/25/19, presents today for endocrine care f/u visit. Pt did not restart Methimazole  after recent ablation. \par \par Today pt presents feeling better, less tired. Denies palpitations, diarrhea, feeling extremely weak, CP, and cold/heat intolerance.  \par \par Current Meds: Eliquis BID, Metoprolol 50 mg QD, Pt did not restart Methimazole \par

## 2019-04-10 NOTE — ADDENDUM
[FreeTextEntry1] : I, Trish Jules, acted soley as a scribe for Dr. Kb Dodge on this date. 04/09/2019.\par I, Milton Weinstein, acted soley as a scribe for Dr. Kb Dodge on this date. 04/09/2019.

## 2019-04-10 NOTE — REVIEW OF SYSTEMS
[Negative] : Endocrine [Palpitations] : no palpitations [Diarrhea] : no diarrhea [Cold Intolerance] : cold tolerant [Heat Intolerance] : heat tolerant [FreeTextEntry2] : Tired

## 2019-04-10 NOTE — PHYSICAL EXAM
[Alert] : alert [Normal Sclera/Conjunctiva] : normal sclera/conjunctiva [Normal Outer Ear/Nose] : the ears and nose were normal in appearance [No Respiratory Distress] : no respiratory distress [Normal Bowel Sounds] : normal bowel sounds [Spine Straight] : spine straight [Normal Gait] : normal gait [No Rash] : no rash [Oriented x3] : oriented to person, place, and time [Normal Rate] : heart rate was normal  [No Edema] : there was no peripheral edema [Pedal Pulses Normal] : the pedal pulses are present [No Stigmata of Cushings Syndrome] : no stigmata of cushings syndrome [de-identified] : R Thyroid Gland Palpated.

## 2019-05-03 LAB
T3FREE SERPL-MCNC: 2.8 PG/ML
T4 FREE SERPL-MCNC: 1 NG/DL
THYROGLOB AB SERPL-ACNC: <20 IU/ML
THYROGLOB SERPL-MCNC: 2318 NG/ML
TSH SERPL-ACNC: 0.33 UIU/ML

## 2019-05-14 ENCOUNTER — APPOINTMENT (OUTPATIENT)
Dept: HEART AND VASCULAR | Facility: CLINIC | Age: 59
End: 2019-05-14
Payer: COMMERCIAL

## 2019-05-14 VITALS
WEIGHT: 189 LBS | HEIGHT: 58.66 IN | OXYGEN SATURATION: 98 % | TEMPERATURE: 96.6 F | SYSTOLIC BLOOD PRESSURE: 122 MMHG | BODY MASS INDEX: 38.62 KG/M2 | DIASTOLIC BLOOD PRESSURE: 80 MMHG | HEART RATE: 62 BPM

## 2019-05-14 PROCEDURE — 93000 ELECTROCARDIOGRAM COMPLETE: CPT

## 2019-05-14 PROCEDURE — 99214 OFFICE O/P EST MOD 30 MIN: CPT

## 2019-05-14 NOTE — PHYSICAL EXAM
[Well Groomed] : well groomed [Normal Appearance] : normal appearance [General Appearance - Well Developed] : well developed [General Appearance - In No Acute Distress] : no acute distress [General Appearance - Well Nourished] : well nourished [No Deformities] : no deformities [Normal Jugular Venous V Waves Present] : normal jugular venous V waves present [Normal Jugular Venous A Waves Present] : normal jugular venous A waves present [No Jugular Venous Mora A Waves] : no jugular venous mora A waves [Respiration, Rhythm And Depth] : normal respiratory rhythm and effort [Heart Rate And Rhythm] : heart rate and rhythm were normal [Auscultation Breath Sounds / Voice Sounds] : lungs were clear to auscultation bilaterally [Exaggerated Use Of Accessory Muscles For Inspiration] : no accessory muscle use [Heart Sounds] : normal S1 and S2 [Nail Clubbing] : no clubbing of the fingernails [Murmurs] : no murmurs present [Cyanosis, Localized] : no localized cyanosis [] : no ischemic changes [Petechial Hemorrhages (___cm)] : no petechial hemorrhages

## 2019-05-14 NOTE — DISCUSSION/SUMMARY
[FreeTextEntry1] : afib - rate controlled on MTP and eliquis\par EKG afib 44 no nonspecific st changes unchanged from inpt\par appreciats EP input as hyperthyroid is resolving will need to titrate BB \par \par hyperthyroid - on methimazole f/u endo\par \par ef preserved

## 2019-05-14 NOTE — HISTORY OF PRESENT ILLNESS
[FreeTextEntry1] : 59 y/o woman non smoker with no known PMH (on no meds at home) was sent from \par PMD/NP office to [facility] ED for a new onset A fib on office EKG -120.  In ED \par VS: 147/89, A fib 100-120, afebrile, RR 20, O2 sat 98% RA, troponin negative x \par 1, d-dimer elevated at  274, TSH low at 0.004.  Patient is awaiting Chest CTA \par in ED. No meds given in ED. \par Patient is poor historian.  For the past several weeks with fatigue, exertional \par SOB, palpitations and abdominal cramps without change in bowel.  Admits to \par unintentional weight loss but cant say how much.  Denies any prior hx \par endocrine/thyroid problems, denies any prior hx arrhythmias. \par \par On admission, pt r/o ACS, CTA negative for PE. Endocrine was consulted for \par hyperthyroidism. Pt was continued on heparin gtt therapeutically anticoagulated \par for atrial fibrillation (chadsvasc1) and rate controlled with propanolol. EP \par saw patient, recommended AC as otpt and DCCV once hyperthyroidism worked up. \par Echo performed h3/15 revealed dilated LA, mild-mod MR, LVEF 55-60%, LV wall \par motion normal, mild concentric LVH. On 3/16 TPO antibodies came back elevated, \par and endocrinology suspecting graves thyroiditis as etiology. Patient was \par thoroughly educated on condition by endocrinology and cardiology PA for at \par least 30 minutes each and she demonstrates poor insight for her diagnosis. She \par does understand that she needs medical follow up and failure to follow up or \par take medication as directed can lead to death. her friend was present during \par discussion and states that she will reinforce her follow up upon discharge. \par Today, VSS (HR remained 90-100s in afib), labs WNL, and PE WNL (unchanged). \par Patient was seen and examined by Dr. Marroquin and endocrinology and instructed on \par the importance of follow up. she was instructed to return to the hospital or \par seek immediate medical attention if symptoms worsen including not limited to \par chest pain, shortness of breath, palpitations. Medications were prescribed to \par the preferred pharmacy for  and verified they were filled (Methimazole \par 10 mg orally daily, Eliquis 5 mg orally twice daily, Toprol 50 mg orally \par daily). Pt verbalizes understanding to  medications. She states that she \par understands she needs to follow up and take the medication but would not like \par to discuss her condition further.  She understands and verbalizes risk of no \par follow up and med noncompliance. \par Discussed case thoroughly with Dr. Marroquin, endocrine, EP. \par \par Thyroid US reviewed by endo. \par  US Thyroid + Parathyroid (03.16.18 @ 11:22) > \par IMPRESSION: \par 1.  Mildly heterogeneous and hypervascular thyroid, which may represent \par thyroiditis. \par 2.  0.8 cm solid nodule in the right lobe, not meeting criteria for FNA. \par \par \par **Upon prescribing Xarelto *(preferred 2/2 once a day dosing) pharmacy reported \par 126 dollar copay. Also the same for Eliquis and Pradaxa. Eliquis 5 mg orally \par BID was prescribed (because we had coupons) and patient was provided with \par coupons to get the medication for 10 dollar copay (given for 3 mo). The \par pharmacy was alerted that she will be bringing in coupons and cancelled the \par xarelto.\par \par 9/26/18 saw another cardio who dec pt eliquis to 2.5bid, pt here for second opinion.\par ekg rate controlled afib'\par endo recs appreciated\par labs reviewed - pt is no <60kg, normal GFr, and no hx of cva - there is no relative or absolute contraindication for low dose eliquis\par \par 12/18/18 maintained on ful dose eliquis and MTP succinate - HR at goal ekg un changed\par no palp syncope cp sob + weight gain\par \par 5/14/19 ekg with afib slow VR - no syncope palp or dizziness\par usoh \par still receiving rx for hyperthyroid\par \par

## 2019-05-28 ENCOUNTER — APPOINTMENT (OUTPATIENT)
Dept: ENDOCRINOLOGY | Facility: CLINIC | Age: 59
End: 2019-05-28

## 2019-06-13 ENCOUNTER — APPOINTMENT (OUTPATIENT)
Dept: ENDOCRINOLOGY | Facility: CLINIC | Age: 59
End: 2019-06-13
Payer: COMMERCIAL

## 2019-06-13 VITALS
WEIGHT: 190 LBS | BODY MASS INDEX: 38.82 KG/M2 | SYSTOLIC BLOOD PRESSURE: 136 MMHG | HEART RATE: 77 BPM | DIASTOLIC BLOOD PRESSURE: 95 MMHG

## 2019-06-13 PROCEDURE — 99213 OFFICE O/P EST LOW 20 MIN: CPT

## 2019-06-14 ENCOUNTER — OTHER (OUTPATIENT)
Age: 59
End: 2019-06-14

## 2019-06-14 LAB
ALBUMIN SERPL ELPH-MCNC: 4.8 G/DL
ALP BLD-CCNC: 109 U/L
ALT SERPL-CCNC: 20 U/L
ANION GAP SERPL CALC-SCNC: 14 MMOL/L
AST SERPL-CCNC: 32 U/L
BASOPHILS # BLD AUTO: 0.08 K/UL
BASOPHILS NFR BLD AUTO: 1 %
BILIRUB SERPL-MCNC: 0.2 MG/DL
BUN SERPL-MCNC: 10 MG/DL
CALCIUM SERPL-MCNC: 9.7 MG/DL
CHLORIDE SERPL-SCNC: 98 MMOL/L
CO2 SERPL-SCNC: 28 MMOL/L
CREAT SERPL-MCNC: 1.41 MG/DL
EOSINOPHIL # BLD AUTO: 0.25 K/UL
EOSINOPHIL NFR BLD AUTO: 3.1 %
GLUCOSE SERPL-MCNC: 82 MG/DL
HCT VFR BLD CALC: 41.5 %
HGB BLD-MCNC: 13.9 G/DL
IMM GRANULOCYTES NFR BLD AUTO: 0.1 %
LYMPHOCYTES # BLD AUTO: 3.04 K/UL
LYMPHOCYTES NFR BLD AUTO: 37.2 %
MAN DIFF?: NORMAL
MCHC RBC-ENTMCNC: 30 PG
MCHC RBC-ENTMCNC: 33.5 GM/DL
MCV RBC AUTO: 89.6 FL
MONOCYTES # BLD AUTO: 0.42 K/UL
MONOCYTES NFR BLD AUTO: 5.1 %
NEUTROPHILS # BLD AUTO: 4.38 K/UL
NEUTROPHILS NFR BLD AUTO: 53.5 %
PLATELET # BLD AUTO: 289 K/UL
POTASSIUM SERPL-SCNC: 4.2 MMOL/L
PROT SERPL-MCNC: 8 G/DL
RBC # BLD: 4.63 M/UL
RBC # FLD: 14.6 %
SODIUM SERPL-SCNC: 140 MMOL/L
T3FREE SERPL-MCNC: <0.39 PG/ML
T4 FREE SERPL-MCNC: <0.1 NG/DL
TSH SERPL-ACNC: 92.3 UIU/ML
WBC # FLD AUTO: 8.18 K/UL

## 2019-06-14 RX ORDER — METHIMAZOLE 10 MG/1
10 TABLET ORAL DAILY
Qty: 30 | Refills: 2 | Status: DISCONTINUED | COMMUNITY
Start: 2018-04-05 | End: 2019-06-14

## 2019-06-14 NOTE — ASSESSMENT
[Methimazole Therapy] : Risks and benefits of methimazole therapy were discussed with the patient,  including rash, liver dysfunction, and agranulocytosis.  Patient was instructed to call the office for flu-like symptoms eg fever and sore-throat [FreeTextEntry1] : 60 y/o F pt with:\par \par 1. s/p radioiodine ablation 3/25/19:\par Pt is on methimazole 10 mg. Will order TFTs and call pt with results.\par \par Return in 3 months. [Levothyroxine] : The patient was instructed to take Levothyroxine on an empty stomach, separate from vitamins, and wait at least 30 minutes before eating

## 2019-06-14 NOTE — END OF VISIT
[FreeTextEntry3] : All medical record entries made by the Scribe were at my, Dr. Kb Dodge, direction and personally dictated by me on 06/13/2019. I have reviewed the chart and agree that the record accurately reflects my personal performance of the history, physical exam, assessment and plan. I have also personally directed, reviewed and agreed with the chart.\par  [>50% of Time Spent on Counseling for ____] : Greater than 50% of the encounter time was spent on counseling for [unfilled] [Time Spent: ___ minutes] : I have spent [unfilled] minutes of face to face time with the patient

## 2019-06-14 NOTE — PHYSICAL EXAM
[Alert] : alert [Normal Sclera/Conjunctiva] : normal sclera/conjunctiva [Normal Outer Ear/Nose] : the ears and nose were normal in appearance [Normal Rate] : heart rate was normal  [No Respiratory Distress] : no respiratory distress [Pedal Pulses Normal] : the pedal pulses are present [No Edema] : there was no peripheral edema [Normal Bowel Sounds] : normal bowel sounds [No Stigmata of Cushings Syndrome] : no stigmata of cushings syndrome [Spine Straight] : spine straight [Normal Gait] : normal gait [No Rash] : no rash [Oriented x3] : oriented to person, place, and time [de-identified] : thyroid glands palpated, does not appear enlarged

## 2019-06-14 NOTE — REVIEW OF SYSTEMS
[Recent Weight Gain (___ Lbs)] : recent [unfilled] ~Ulb weight gain [As Noted in HPI] : as noted in HPI [Back Pain] : back pain [Negative] : Gastrointestinal [Cold Intolerance] : cold tolerant [FreeTextEntry7] : denies GI disturbances

## 2019-06-14 NOTE — HISTORY OF PRESENT ILLNESS
[FreeTextEntry1] : 1/7/19: free T3 14.61, free T4 2.5, TSH 0.01\par 1/31/19: free T3 3.3, free T4 0.9, TSH 0.02\par 2/14/19: Free T3 2.29, Free T4 0.4, TSH 6.89\par 4/9/19: Thyroglobulin 2318, Thyroglobulin ab <20.0, TSH 0.33, Free T3 2.80, \par 4/10/19: Free T4 1.0\par \par 58 y/o F pt with Hx of Grave's disease, s/p MEANS ablation on 3/25/19. \par Pt did not restart Methimazole after recent ablation. \par \par 6/13/19's visit:\par Today pt presents for thyroid f/u, feeling well with c/o chronic back pain.\par Pt has gained 12lbs since her visit in 4/2019. \par Pt denies GI disturbances, cold intolerance. \par \par Current Medications: Methimazole 1 tablet once a day\par \par Current Meds: Eliquis BID, Metoprolol 50 mg QD, Pt did not restart Methimazole

## 2019-06-14 NOTE — ADDENDUM
[FreeTextEntry1] : I, Mariolizyz Jules, acted solely as a scribe for Dr. Kb Dodge on this date. 06/13/2019.\par

## 2019-06-19 ENCOUNTER — APPOINTMENT (OUTPATIENT)
Dept: HEART AND VASCULAR | Facility: CLINIC | Age: 59
End: 2019-06-19

## 2019-07-16 ENCOUNTER — APPOINTMENT (OUTPATIENT)
Dept: ENDOCRINOLOGY | Facility: CLINIC | Age: 59
End: 2019-07-16
Payer: COMMERCIAL

## 2019-07-16 VITALS
HEART RATE: 40 BPM | SYSTOLIC BLOOD PRESSURE: 123 MMHG | DIASTOLIC BLOOD PRESSURE: 72 MMHG | WEIGHT: 183 LBS | BODY MASS INDEX: 37.39 KG/M2

## 2019-07-16 PROCEDURE — 99214 OFFICE O/P EST MOD 30 MIN: CPT

## 2019-07-17 NOTE — ASSESSMENT
[Methimazole Therapy] : Risks and benefits of methimazole therapy were discussed with the patient,  including rash, liver dysfunction, and agranulocytosis.  Patient was instructed to call the office for flu-like symptoms eg fever and sore-throat [Levothyroxine] : The patient was instructed to take Levothyroxine on an empty stomach, separate from vitamins, and wait at least 30 minutes before eating [FreeTextEntry1] : 60 y/o F pt with:\par \par 1. Hx of Graves disease s/p radioiodine ablation on 3/25/19:\par Secondary hypothyroidism\par Pt has clinically improved, she is euthyroid.Her voice, and GI disturbances  has improved. \par Continue levothyroxine 100 mcg qd\par Will order TFT today.\par \par \par Return in September.

## 2019-07-17 NOTE — HISTORY OF PRESENT ILLNESS
[FreeTextEntry1] : 1/7/19: free T3 14.61, free T4 2.5, TSH 0.01\par 1/31/19: free T3 3.3, free T4 0.9, TSH 0.02\par 2/14/19: Free T3 2.29, Free T4 0.4, TSH 6.89\par 4/9/19: Thyroglobulin 2318, Thyroglobulin ab <20.0, TSH 0.33, Free T3 2.80, \par 4/10/19: Free T4 1.0\par 6/13/19: Free T3 <0.39, Free T4 <0.1, TSH 92.30, s. creat 1.41, \par \par 58 y/o F pt with Hx of Grave's disease, s/p MEANS ablation on 3/25/19. \par Pt initially did not restart Methimazole after her ablation. \par \par 6/13/19's visit:\par Today pt presents for thyroid f/u, feeling well with c/o chronic back pain.\par Pt has gained 12lbs since her visit in 4/2019. \par Pt denies GI disturbances, cold intolerance. \par \par 7/16/19's visit:\par Today pt presents for thyroid f/u, feeling better with no major physical complaints besides her R hand wrist sprain. Pt states she fell on Sunday night, she states it is not broken or fractured.  \par Pt lost 7lbs since her last visit. \par Denies SOB, feeling tired, GI disturbances.\par \par Current Meds: Eliquis BID, Metoprolol 50 mg QD, Levothyroxine 100mcg

## 2019-07-17 NOTE — REVIEW OF SYSTEMS
[As Noted in HPI] : as noted in HPI [Negative] : Endocrine [Recent Weight Loss (___ Lbs)] : recent [unfilled] ~Ulb weight loss [Shortness Of Breath] : no shortness of breath [Cold Intolerance] : cold tolerant [FreeTextEntry2] : denies feeling tired  [FreeTextEntry7] : denies GI disturbances  [FreeTextEntry9] : R hand wrist sprain

## 2019-07-17 NOTE — PHYSICAL EXAM
[Alert] : alert [Normal Sclera/Conjunctiva] : normal sclera/conjunctiva [Normal Outer Ear/Nose] : the ears and nose were normal in appearance [No Respiratory Distress] : no respiratory distress [Normal Rate] : heart rate was normal  [Pedal Pulses Normal] : the pedal pulses are present [No Edema] : there was no peripheral edema [Normal Bowel Sounds] : normal bowel sounds [Spine Straight] : spine straight [No Stigmata of Cushings Syndrome] : no stigmata of cushings syndrome [Normal Gait] : normal gait [No Rash] : no rash [Oriented x3] : oriented to person, place, and time [de-identified] : thyroid glands palpated, does not appear enlarged

## 2019-07-17 NOTE — END OF VISIT
[FreeTextEntry3] : All medical record entries made by the Scribe were at my, Dr. Kb Dodge, direction and personally dictated by me on 07/16/2019. I have reviewed the chart and agree that the record accurately reflects my personal performance of the history, physical exam, assessment and plan. I have also personally directed, reviewed and agreed with the chart.\par  [>50% of Time Spent on Counseling for ____] : Greater than 50% of the encounter time was spent on counseling for [unfilled] [Time Spent: ___ minutes] : I have spent [unfilled] minutes of face to face time with the patient

## 2019-07-17 NOTE — ADDENDUM
[FreeTextEntry1] : I, Mariolizzy Jules, acted solely as a scribe for Dr. Kb Dodge on this date. 07/16/2019.\par

## 2019-08-07 ENCOUNTER — NON-APPOINTMENT (OUTPATIENT)
Age: 59
End: 2019-08-07

## 2019-08-07 ENCOUNTER — APPOINTMENT (OUTPATIENT)
Dept: HEART AND VASCULAR | Facility: CLINIC | Age: 59
End: 2019-08-07
Payer: COMMERCIAL

## 2019-08-07 VITALS
SYSTOLIC BLOOD PRESSURE: 155 MMHG | HEIGHT: 59 IN | HEART RATE: 44 BPM | BODY MASS INDEX: 36.89 KG/M2 | DIASTOLIC BLOOD PRESSURE: 70 MMHG | WEIGHT: 183 LBS

## 2019-08-07 PROCEDURE — 99204 OFFICE O/P NEW MOD 45 MIN: CPT | Mod: 25

## 2019-08-07 PROCEDURE — 93000 ELECTROCARDIOGRAM COMPLETE: CPT

## 2019-08-07 NOTE — PHYSICAL EXAM
[General Appearance - Well Developed] : well developed [Normal Appearance] : normal appearance [General Appearance - Well Nourished] : well nourished [Normal Conjunctiva] : the conjunctiva exhibited no abnormalities [Normal Oral Mucosa] : normal oral mucosa [Normal Jugular Venous V Waves Present] : normal jugular venous V waves present [Heart Sounds] : normal S1 and S2 [Heart Rate And Rhythm] : heart rate and rhythm were normal [Murmurs] : no murmurs present [Arterial Pulses Normal] : the arterial pulses were normal [] : no respiratory distress [Abdomen Soft] : soft [Respiration, Rhythm And Depth] : normal respiratory rhythm and effort [Abdomen Tenderness] : non-tender [Abnormal Walk] : normal gait [Cyanosis, Localized] : no localized cyanosis [Nail Clubbing] : no clubbing of the fingernails [Oriented To Time, Place, And Person] : oriented to person, place, and time [Impaired Insight] : insight and judgment were intact [Affect] : the affect was normal

## 2019-08-07 NOTE — DISCUSSION/SUMMARY
[FreeTextEntry1] : 59 year old woman with PMHx including persistent atrial fibrillation and Grave's disease s/p MEANS March 2019. She is now euthyroid and her afib has converted back to normal sinus rhythm sometime between May 2019 and today. She co-incidentally has felt a marked improvement in her functional capacity during this time period. She no longer needs to be on a beta blocker given that she is back in a sinus rhythm and her heart rate is 42 today. We would recommend longterm monitoring of her afib with a loop recorder to assess if the afib has truly been cured after her MEANS or if she still has paroxysmal afib. If the afib is truly cured, she may be able to discontinue her eliquis in the future. The ILR will also me helpful in assessing her heart rates off the beta blocker.\par - Discontinue Metoprolol\par - Scheduled for ILR implant on 8/13/19

## 2019-08-07 NOTE — HISTORY OF PRESENT ILLNESS
[FreeTextEntry1] : Ms. Brandt is a 59 year old woman with PMHx including atrial fibrillation and Grave's disease s/p MEANS (March 2019) who presents for evaluation of management of her afib. She had been in persistent afib over the 1.5 years in the setting of hyperthyroidism. However, she received a radioactive iodine ablation in March 2019 and is now euthyroid. She reports her exercise tolerance dramatically improved over the past two months (now able to easily climb subway stairs again) and she feels well. She has no palpitations, dyspnea on exertion or chest discomfort.

## 2019-09-02 LAB
T3FREE SERPL-MCNC: 4.05 PG/ML
T4 FREE SERPL-MCNC: 1.8 NG/DL
TSH SERPL-ACNC: 0.41 UIU/ML

## 2019-09-17 ENCOUNTER — APPOINTMENT (OUTPATIENT)
Dept: ENDOCRINOLOGY | Facility: CLINIC | Age: 59
End: 2019-09-17
Payer: COMMERCIAL

## 2019-09-17 VITALS
WEIGHT: 181 LBS | BODY MASS INDEX: 36.56 KG/M2 | HEART RATE: 50 BPM | SYSTOLIC BLOOD PRESSURE: 153 MMHG | DIASTOLIC BLOOD PRESSURE: 97 MMHG

## 2019-09-17 PROCEDURE — 99214 OFFICE O/P EST MOD 30 MIN: CPT

## 2019-09-18 LAB
ALBUMIN SERPL ELPH-MCNC: 4.2 G/DL
ALP BLD-CCNC: 98 U/L
ALT SERPL-CCNC: 12 U/L
ANION GAP SERPL CALC-SCNC: 11 MMOL/L
AST SERPL-CCNC: 12 U/L
BILIRUB SERPL-MCNC: 0.2 MG/DL
BUN SERPL-MCNC: 10 MG/DL
CALCIUM SERPL-MCNC: 9.6 MG/DL
CHLORIDE SERPL-SCNC: 105 MMOL/L
CO2 SERPL-SCNC: 28 MMOL/L
CREAT SERPL-MCNC: 1.22 MG/DL
GLUCOSE SERPL-MCNC: 93 MG/DL
POTASSIUM SERPL-SCNC: 3.6 MMOL/L
PROT SERPL-MCNC: 7.3 G/DL
SODIUM SERPL-SCNC: 144 MMOL/L
T4 FREE SERPL-MCNC: 0.9 NG/DL
TSH SERPL-ACNC: 4.08 UIU/ML

## 2019-09-18 NOTE — ASSESSMENT
[Methimazole Therapy] : Risks and benefits of methimazole therapy were discussed with the patient,  including rash, liver dysfunction, and agranulocytosis.  Patient was instructed to call the office for flu-like symptoms eg fever and sore-throat [Levothyroxine] : The patient was instructed to take Levothyroxine on an empty stomach, separate from vitamins, and wait at least 30 minutes before eating [FreeTextEntry1] : 60 y/o F pt with:\par \par 1. Hx of secondary hypothyroidism:\par no any other signs or symptoms of any other endocrine autoimmune disorder\par Pt received radioiodine ablation on 3/25/19. She appears to be euthyroid is on 100 mcg of Levothyroxine.\par Again explained how to take lt4\par Will order labs today.\par 2. Obesity\par Assessing for comorbidities associated with\par Return in 4 months.

## 2019-09-18 NOTE — PHYSICAL EXAM
[Alert] : alert [Normal Sclera/Conjunctiva] : normal sclera/conjunctiva [Normal Outer Ear/Nose] : the ears and nose were normal in appearance [No Respiratory Distress] : no respiratory distress [Normal Rate] : heart rate was normal  [No Edema] : there was no peripheral edema [Pedal Pulses Normal] : the pedal pulses are present [Normal Bowel Sounds] : normal bowel sounds [Spine Straight] : spine straight [No Stigmata of Cushings Syndrome] : no stigmata of cushings syndrome [Normal Gait] : normal gait [No Rash] : no rash [Oriented x3] : oriented to person, place, and time [Normal Reflexes] : deep tendon reflexes were 2+ and symmetric [de-identified] : thyroid glands palpated, does not appear enlarged

## 2019-09-18 NOTE — ADDENDUM
[FreeTextEntry1] : I, Trish Jules, acted solely as a scribe for Dr. Kb Dodge on this date. 09/17/2019.

## 2019-09-18 NOTE — REVIEW OF SYSTEMS
[Recent Weight Loss (___ Lbs)] : recent [unfilled] ~Ulb weight loss [Negative] : Musculoskeletal [As Noted in HPI] : as noted in HPI [FreeTextEntry7] : denies irregular bowel movements

## 2019-09-18 NOTE — END OF VISIT
[FreeTextEntry3] : All medical record entries made by the Scribe were at my, Dr. Kb Dodge, direction and personally dictated by me on 09/17/2019. I have reviewed the chart and agree that the record accurately reflects my personal performance of the history, physical exam, assessment and plan. I have also personally directed, reviewed and agreed with the chart.  [>50% of Time Spent on Counseling for ____] : Greater than 50% of the encounter time was spent on counseling for [unfilled] [Time Spent: ___ minutes] : I have spent [unfilled] minutes of face to face time with the patient

## 2019-09-18 NOTE — HISTORY OF PRESENT ILLNESS
[FreeTextEntry1] : 1/7/19: free T3 14.61, free T4 2.5, TSH 0.01\par 1/31/19: free T3 3.3, free T4 0.9, TSH 0.02\par 2/14/19: Free T3 2.29, Free T4 0.4, TSH 6.89\par 4/9/19: Thyroglobulin 2318, Thyroglobulin ab <20.0, TSH 0.33, Free T3 2.80, \par 4/10/19: Free T4 1.0\par 6/13/19: Free T3 <0.39, Free T4 <0.1, TSH 92.30, s. creat 1.41, \par 7/16/19: TSH 0.41, Free T4 1.8, Free T3 4.05 \par \par 60 y/o F pt with Hx of Grave's disease, s/p MEANS ablation on 3/25/19. \par Pt initially did not restart Methimazole after her ablation. \par \par 6/13/19's visit:\par Today pt presents for thyroid f/u, feeling well with c/o chronic back pain.\par Pt has gained 12lbs since her visit in 4/2019. \par Pt denies GI disturbances, cold intolerance. \par \par 7/16/19's visit:\par Today pt presents for thyroid f/u, feeling better with no major physical complaints besides her R hand wrist sprain. Pt states she fell on Sunday night, she states it is not broken or fractured.  \par Pt lost 7lbs since her last visit. \par Denies SOB, feeling tired, GI disturbances.\par \par 9/17/19's visit:\par Today pt presents for thyroid f/u, feeling well with no major physical complaints. Pt states she went to OB/GYN and was told she has fibroids; she notes she was recommended to have a hysterectomy.    \par Pt lost 2lbs since her last visit here. \par Pt denies having anemia and irregular bowel movements. \par \par Current Meds: Eliquis BID, Metoprolol 50 mg QD, Levothyroxine 100mcg

## 2019-10-01 ENCOUNTER — APPOINTMENT (OUTPATIENT)
Dept: ENDOCRINOLOGY | Facility: CLINIC | Age: 59
End: 2019-10-01

## 2019-10-03 ENCOUNTER — OUTPATIENT (OUTPATIENT)
Dept: OUTPATIENT SERVICES | Facility: HOSPITAL | Age: 59
LOS: 1 days | Discharge: HOME | End: 2019-10-03
Payer: COMMERCIAL

## 2019-10-03 PROCEDURE — 33285 INSJ SUBQ CAR RHYTHM MNTR: CPT

## 2019-10-03 PROCEDURE — C1764: CPT

## 2019-10-03 NOTE — PROGRESS NOTE ADULT - SUBJECTIVE AND OBJECTIVE BOX
EPS Progress Note    S: 58 yo f with history of A.fib, Grave's disease s/p MEANS presents for ILR implant for A.fib managment.      MEDICATIONS  (STANDING):  Eliquis  Metoprolol  Levothyroxine            General:  NAD        HEENT:    PERRL, EOMI	  Neck: Supple, - JVD  Cardiovascular: Normal S1 S2, No JVD  Respiratory: CTA B/L        Gastrointestinal:  Soft, Non-tender, + BS	  Skin: No rashes, No ecchymoses, No cyanosis  Extremities: Normal range of motion, No clubbing, cyanosis or edema  Vascular: Peripheral pulses palpable 2+ bilaterally  Psychiatry: A & O x 3	         Labs:                                             Assessment/Plan:    58 yo f with history of A.fib, Grave's disease s/p MEANS presents for ILR implant for A.fib management   Will discharge home today.

## 2019-10-28 ENCOUNTER — APPOINTMENT (OUTPATIENT)
Dept: HEART AND VASCULAR | Facility: CLINIC | Age: 59
End: 2019-10-28
Payer: COMMERCIAL

## 2019-10-28 VITALS
DIASTOLIC BLOOD PRESSURE: 80 MMHG | HEIGHT: 59 IN | BODY MASS INDEX: 36.49 KG/M2 | WEIGHT: 181 LBS | SYSTOLIC BLOOD PRESSURE: 180 MMHG | HEART RATE: 59 BPM

## 2019-10-28 PROCEDURE — 93285 PRGRMG DEV EVAL SCRMS IP: CPT

## 2019-10-30 NOTE — HISTORY OF PRESENT ILLNESS
[FreeTextEntry1] : Ms. Brandt is a 59 year old woman with PMHx including atrial fibrillation and Grave's disease s/p MEANS (March 2019) who presents for evaluation of management of her afib. She had been in persistent afib over the 1.5 years in the setting of hyperthyroidism. However, she received a radioactive iodine ablation in March 2019 and is now euthyroid. She reports her exercise tolerance dramatically improved (now able to easily climb subway stairs again) and she feels well. She has no palpitations, dyspnea on exertion or chest discomfort.  She is s/p ILR placement 10/3/19 for long-term heart rhythm assessment.

## 2019-10-30 NOTE — DISCUSSION/SUMMARY
[FreeTextEntry1] : 59 year old woman with PMHx including persistent atrial fibrillation and Grave's disease s/p MEANS March 2019. She is now euthyroid and her afib has converted back to normal sinus rhythm sometime between May 2019 and today. She co-incidentally has felt a marked improvement in her functional capacity during this time period. Off beta-blocker given resting bradycardia in sinus rhythm.  Now s/p ILR placement.  No AT/AF detected since device implant.  She was advised to connect to remote monitoring and return for follow-up in six months.  \par

## 2019-10-30 NOTE — PHYSICAL EXAM
[General Appearance - Well Developed] : well developed [Normal Appearance] : normal appearance [General Appearance - Well Nourished] : well nourished [Heart Rate And Rhythm] : heart rate and rhythm were normal [Heart Sounds] : normal S1 and S2 [Murmurs] : no murmurs present [Arterial Pulses Normal] : the arterial pulses were normal [] : no respiratory distress [Respiration, Rhythm And Depth] : normal respiratory rhythm and effort [Abdomen Soft] : soft [Abdomen Tenderness] : non-tender [Nail Clubbing] : no clubbing of the fingernails [Cyanosis, Localized] : no localized cyanosis [Normal Conjunctiva] : the conjunctiva exhibited no abnormalities [Normal Oral Mucosa] : normal oral mucosa [Normal Jugular Venous V Waves Present] : normal jugular venous V waves present [Abnormal Walk] : normal gait [Oriented To Time, Place, And Person] : oriented to person, place, and time [Impaired Insight] : insight and judgment were intact [Affect] : the affect was normal

## 2019-11-13 ENCOUNTER — RX RENEWAL (OUTPATIENT)
Age: 59
End: 2019-11-13

## 2019-11-26 ENCOUNTER — APPOINTMENT (OUTPATIENT)
Dept: HEART AND VASCULAR | Facility: CLINIC | Age: 59
End: 2019-11-26
Payer: COMMERCIAL

## 2019-11-26 VITALS
HEART RATE: 66 BPM | DIASTOLIC BLOOD PRESSURE: 70 MMHG | OXYGEN SATURATION: 97 % | BODY MASS INDEX: 35.55 KG/M2 | WEIGHT: 176 LBS | SYSTOLIC BLOOD PRESSURE: 150 MMHG

## 2019-11-26 PROCEDURE — 99213 OFFICE O/P EST LOW 20 MIN: CPT

## 2019-11-26 PROCEDURE — 93000 ELECTROCARDIOGRAM COMPLETE: CPT

## 2019-11-28 NOTE — PHYSICAL EXAM
[Normal Appearance] : normal appearance [General Appearance - Well Developed] : well developed [General Appearance - Well Nourished] : well nourished [Well Groomed] : well groomed [No Deformities] : no deformities [General Appearance - In No Acute Distress] : no acute distress [Normal Jugular Venous V Waves Present] : normal jugular venous V waves present [Normal Jugular Venous A Waves Present] : normal jugular venous A waves present [No Jugular Venous Mora A Waves] : no jugular venous mora A waves [Respiration, Rhythm And Depth] : normal respiratory rhythm and effort [Exaggerated Use Of Accessory Muscles For Inspiration] : no accessory muscle use [Auscultation Breath Sounds / Voice Sounds] : lungs were clear to auscultation bilaterally [Heart Rate And Rhythm] : heart rate and rhythm were normal [Nail Clubbing] : no clubbing of the fingernails [Murmurs] : no murmurs present [Heart Sounds] : normal S1 and S2 [Petechial Hemorrhages (___cm)] : no petechial hemorrhages [Cyanosis, Localized] : no localized cyanosis [] : no ischemic changes

## 2019-11-28 NOTE — HISTORY OF PRESENT ILLNESS
[FreeTextEntry1] : 59 y/o woman non smoker with no known PMH (on no meds at home) was sent from \par PMD/NP office to [facility] ED for a new onset A fib on office EKG -120.  In ED \par VS: 147/89, A fib 100-120, afebrile, RR 20, O2 sat 98% RA, troponin negative x \par 1, d-dimer elevated at  274, TSH low at 0.004.  Patient is awaiting Chest CTA \par in ED. No meds given in ED. \par Patient is poor historian.  For the past several weeks with fatigue, exertional \par SOB, palpitations and abdominal cramps without change in bowel.  Admits to \par unintentional weight loss but cant say how much.  Denies any prior hx \par endocrine/thyroid problems, denies any prior hx arrhythmias. \par \par On admission, pt r/o ACS, CTA negative for PE. Endocrine was consulted for \par hyperthyroidism. Pt was continued on heparin gtt therapeutically anticoagulated \par for atrial fibrillation (chadsvasc1) and rate controlled with propanolol. EP \par saw patient, recommended AC as otpt and DCCV once hyperthyroidism worked up. \par Echo performed h3/15 revealed dilated LA, mild-mod MR, LVEF 55-60%, LV wall \par motion normal, mild concentric LVH. On 3/16 TPO antibodies came back elevated, \par and endocrinology suspecting graves thyroiditis as etiology. Patient was \par thoroughly educated on condition by endocrinology and cardiology PA for at \par least 30 minutes each and she demonstrates poor insight for her diagnosis. She \par does understand that she needs medical follow up and failure to follow up or \par take medication as directed can lead to death. her friend was present during \par discussion and states that she will reinforce her follow up upon discharge. \par Today, VSS (HR remained 90-100s in afib), labs WNL, and PE WNL (unchanged). \par Patient was seen and examined by Dr. Marroquin and endocrinology and instructed on \par the importance of follow up. she was instructed to return to the hospital or \par seek immediate medical attention if symptoms worsen including not limited to \par chest pain, shortness of breath, palpitations. Medications were prescribed to \par the preferred pharmacy for  and verified they were filled (Methimazole \par 10 mg orally daily, Eliquis 5 mg orally twice daily, Toprol 50 mg orally \par daily). Pt verbalizes understanding to  medications. She states that she \par understands she needs to follow up and take the medication but would not like \par to discuss her condition further.  She understands and verbalizes risk of no \par follow up and med noncompliance. \par Discussed case thoroughly with Dr. Marroquin, endocrine, EP. \par \par Thyroid US reviewed by endo. \par  US Thyroid + Parathyroid (03.16.18 @ 11:22) > \par IMPRESSION: \par 1.  Mildly heterogeneous and hypervascular thyroid, which may represent \par thyroiditis. \par 2.  0.8 cm solid nodule in the right lobe, not meeting criteria for FNA. \par \par \par **Upon prescribing Xarelto *(preferred 2/2 once a day dosing) pharmacy reported \par 126 dollar copay. Also the same for Eliquis and Pradaxa. Eliquis 5 mg orally \par BID was prescribed (because we had coupons) and patient was provided with \par coupons to get the medication for 10 dollar copay (given for 3 mo). The \par pharmacy was alerted that she will be bringing in coupons and cancelled the \par xarelto.\par \par 9/26/18 saw another cardio who dec pt eliquis to 2.5bid, pt here for second opinion.\par ekg rate controlled afib'\par endo recs appreciated\par labs reviewed - pt is no <60kg, normal GFr, and no hx of cva - there is no relative or absolute contraindication for low dose eliquis\par \par 12/18/18 maintained on ful dose eliquis and MTP succinate - HR at goal ekg un changed\par no palp syncope cp sob + weight gain\par \par 5/14/19 ekg with afib slow VR - no syncope palp or dizziness\par usoh \par still receiving rx for hyperthyroid\par \par 11/26/19 Interval Symptoms: usual state of health, 100% compliant with meds. \par pnd - no\par orthopnea - no\par leg edema - no\par syncope - no\par dizziness - no\par palpitations - no\par leg pain - no\par SOB - no\par chest pain - no\par \par location: chest\par duration: none\par  modifying factors: none\par timing: none\par severity: 0/10\par \par EKG unchanged from prior (in chart)\par consultation notes reviewed where appropriate\par \par Medications: the patient is adherent with his medication regimen. denies medication side effects. \par Disease Management: the patient is doing well with goals. \par \par

## 2019-12-03 ENCOUNTER — APPOINTMENT (OUTPATIENT)
Dept: ENDOCRINOLOGY | Facility: CLINIC | Age: 59
End: 2019-12-03
Payer: COMMERCIAL

## 2019-12-03 VITALS
WEIGHT: 183 LBS | HEART RATE: 59 BPM | DIASTOLIC BLOOD PRESSURE: 93 MMHG | SYSTOLIC BLOOD PRESSURE: 170 MMHG | HEIGHT: 59 IN | BODY MASS INDEX: 36.89 KG/M2

## 2019-12-03 PROCEDURE — 99214 OFFICE O/P EST MOD 30 MIN: CPT

## 2019-12-04 LAB
T3FREE SERPL-MCNC: 3.22 PG/ML
T4 FREE SERPL-MCNC: 1.4 NG/DL
TSH SERPL-ACNC: 0.37 UIU/ML

## 2019-12-05 NOTE — REVIEW OF SYSTEMS
[As Noted in HPI] : as noted in HPI [Negative] : Endocrine [Palpitations] : no palpitations [Cold Intolerance] : cold tolerant [Heat Intolerance] : heat tolerant [FreeTextEntry7] : denies irregular bowel movements

## 2019-12-05 NOTE — ADDENDUM
[FreeTextEntry1] : I, Trish Jules, acted solely as a scribe for Dr. Kb Dodge on this date. 12/03/2019.

## 2019-12-05 NOTE — HISTORY OF PRESENT ILLNESS
[FreeTextEntry1] : 1/7/19: free T3 14.61, free T4 2.5, TSH 0.01\par 1/31/19: free T3 3.3, free T4 0.9, TSH 0.02\par 2/14/19: Free T3 2.29, Free T4 0.4, TSH 6.89\par 4/9/19: Thyroglobulin 2318, Thyroglobulin ab <20.0, TSH 0.33, Free T3 2.80, \par 4/10/19: Free T4 1.0\par 6/13/19: Free T3 <0.39, Free T4 <0.1, TSH 92.30, s. creat 1.41, \par 7/16/19: TSH 0.41, Free T4 1.8, Free T3 4.05 \par 9/17/19 TSH 4.08, Free T4 0.9, s. creat 1.22 \par \par 60 y/o F pt with Hx of Grave's disease, s/p MEANS ablation on 3/25/19. \par Pt initially did not restart Methimazole after her ablation. \par Last cardiologist visit: in 11/2019\par \par 6/13/19's visit:\par Today pt presents for thyroid f/u, feeling well with c/o chronic back pain.\par Pt has gained 12lbs since her visit in 4/2019. \par Pt denies GI disturbances, cold intolerance. \par \par 7/16/19's visit:\par Today pt presents for thyroid f/u, feeling better with no major physical complaints besides her R hand wrist sprain. Pt states she fell on Sunday night, she states it is not broken or fractured.  \par Pt lost 7lbs since her last visit. \par Denies SOB, feeling tired, GI disturbances.\par \par 9/17/19's visit:\par Today pt presents for thyroid f/u, feeling well with no major physical complaints. Pt states she went to OB/GYN and was told she has fibroids; she notes she was recommended to have a hysterectomy.    \par Pt lost 2lbs since her last visit here. \par Pt denies having anemia and irregular bowel movements. \par \par 12/3/19's visit:\par Today pt presents for thyroid f/u, feeling well with no major physical complaints.\par Denies palpitations, hot/cold intolerance, and weight changes.\par \par Current Meds: Eliquis BID, Metoprolol 50 mg QD, Levothyroxine 100mcg

## 2019-12-05 NOTE — PHYSICAL EXAM
[Alert] : alert [Normal Sclera/Conjunctiva] : normal sclera/conjunctiva [Normal Rate] : heart rate was normal  [Normal Outer Ear/Nose] : the ears and nose were normal in appearance [No Respiratory Distress] : no respiratory distress [Pedal Pulses Normal] : the pedal pulses are present [Normal Bowel Sounds] : normal bowel sounds [No Edema] : there was no peripheral edema [No Stigmata of Cushings Syndrome] : no stigmata of cushings syndrome [Spine Straight] : spine straight [Normal Gait] : normal gait [No Rash] : no rash [Normal Reflexes] : deep tendon reflexes were 2+ and symmetric [Oriented x3] : oriented to person, place, and time [de-identified] : thyroid glands not palpated

## 2019-12-05 NOTE — ASSESSMENT
HPI     Last [facility] exam 05/06/2016  Diabetic/ 05/26/2017  IDDM  Pseudophakia, OD  Cataract, nuclear, OS  Screening for glaucoma  RE  No visual complaints  Uses OTC readers, did not bring to exam    Last edited by Daniel Tolbert MA on 5/26/2017  9:00 AM. (History)            Assessment /Plan     For exam results, see Encounter Report.    Hypertension associated with diabetes    Diabetes mellitus type 2 without retinopathy    Cataract, nuclear sclerotic, left eye    Pseudophakia of right eye    Screening for glaucoma    Refractive error      No diabetic retinopathy OU.  Stable EMILY OD.  Moderate NS cataract OS = will follow.  OH OK OU otherwise.  Spec Rx given versus OTC readers +2.25.  RTC one year.                  [Levothyroxine] : The patient was instructed to take Levothyroxine on an empty stomach, separate from vitamins, and wait at least 30 minutes before eating [Methimazole Therapy] : Risks and benefits of methimazole therapy were discussed with the patient,  including rash, liver dysfunction, and agranulocytosis.  Patient was instructed to call the office for flu-like symptoms eg fever and sore-throat [FreeTextEntry1] : 60 y/o F pt with:\par \par 1. Hx of secondary hypothyroidism:\par Pt received radioiodine ablation on 3/25/19. She is clinically and biochemically euthyroid; pt is on 100 mcg of Levothyroxine. Re-explained how to take Levothyroxine. \par Recommend pt continue with Levothyroxine. \par Will order labs today.\par \par Return in 1 yr.

## 2019-12-05 NOTE — END OF VISIT
[FreeTextEntry3] : All medical record entries made by the Scribe were at my, Dr. Kb Dodge, direction and personally dictated by me on 12/03/2019. I have reviewed the chart and agree that the record accurately reflects my personal performance of the history, physical exam, assessment and plan. I have also personally directed, reviewed and agreed with the chart.  [>50% of Time Spent on Counseling for ____] : Greater than 50% of the encounter time was spent on counseling for [unfilled] [Time Spent: ___ minutes] : I have spent [unfilled] minutes of face to face time with the patient

## 2019-12-16 ENCOUNTER — APPOINTMENT (OUTPATIENT)
Dept: HEART AND VASCULAR | Facility: CLINIC | Age: 59
End: 2019-12-16
Payer: COMMERCIAL

## 2019-12-16 VITALS
WEIGHT: 183 LBS | BODY MASS INDEX: 38.41 KG/M2 | HEART RATE: 62 BPM | SYSTOLIC BLOOD PRESSURE: 149 MMHG | HEIGHT: 58 IN | DIASTOLIC BLOOD PRESSURE: 73 MMHG

## 2019-12-16 PROCEDURE — 99213 OFFICE O/P EST LOW 20 MIN: CPT | Mod: 25

## 2019-12-16 PROCEDURE — 93285 PRGRMG DEV EVAL SCRMS IP: CPT

## 2019-12-17 NOTE — HISTORY OF PRESENT ILLNESS
[FreeTextEntry1] : Ms. Brandt is a 59 year old woman with PMHx including atrial fibrillation and Grave's disease s/p MEANS (March 2019). She had been in persistent afib over the 1.5 years in the setting of hyperthyroidism. However, she received a radioactive iodine ablation in March 2019 and is now euthyroid. She reports her exercise tolerance dramatically improved (now able to easily climb subway stairs again) and she feels well. She has no palpitations, dyspnea on exertion or chest discomfort.  She is s/p ILR placement 10/3/19 for long-term heart rhythm assessment.  She offers no device related complaints.

## 2019-12-17 NOTE — PROCEDURE
[de-identified] : MDT REVEAL LINQ\par battery good\par adequate sensing\par 1 tachy event - EGM c/w WCT - likely SVT w/ aberrancy

## 2019-12-17 NOTE — DISCUSSION/SUMMARY
[FreeTextEntry1] : 59 year old woman with PMHx including persistent atrial fibrillation and Grave's disease s/p MEANS March 2019. She is now euthyroid and her afib has converted back to normal sinus rhythm sometime between May 2019 and today. She has felt a marked improvement in her functional capacity during this time period. Off beta-blocker given resting bradycardia in sinus rhythm.  Now s/p ILR placement.  One brief tachy episode noted today, otherwise no AT/AF detected since device implant.  She was advised to connect to remote monitoring and return for follow-up in six months.  \par

## 2020-01-08 ENCOUNTER — OTHER (OUTPATIENT)
Age: 60
End: 2020-01-08

## 2020-01-08 RX ORDER — METOPROLOL SUCCINATE 50 MG/1
50 TABLET, EXTENDED RELEASE ORAL DAILY
Qty: 30 | Refills: 4 | Status: DISCONTINUED | COMMUNITY
Start: 2018-04-05 | End: 2020-01-08

## 2020-01-08 RX ORDER — LEVOTHYROXINE SODIUM 0.11 MG/1
112 TABLET ORAL
Qty: 90 | Refills: 2 | Status: DISCONTINUED | COMMUNITY
Start: 2019-09-18 | End: 2020-01-08

## 2020-01-14 ENCOUNTER — APPOINTMENT (OUTPATIENT)
Dept: ENDOCRINOLOGY | Facility: CLINIC | Age: 60
End: 2020-01-14

## 2020-01-20 NOTE — ED PROVIDER NOTE - CROS ED ROS STATEMENT
Patient given discharge papers and instructions by primary RN. Patient verbalized understanding and stated not having any further questions or concerns regarding her care. Patient ambulatory out of ED with crutches.
all other ROS negative except as per HPI

## 2020-03-03 ENCOUNTER — APPOINTMENT (OUTPATIENT)
Dept: HEART AND VASCULAR | Facility: CLINIC | Age: 60
End: 2020-03-03
Payer: COMMERCIAL

## 2020-03-03 VITALS
HEART RATE: 89 BPM | BODY MASS INDEX: 38.41 KG/M2 | SYSTOLIC BLOOD PRESSURE: 130 MMHG | OXYGEN SATURATION: 97 % | DIASTOLIC BLOOD PRESSURE: 80 MMHG | TEMPERATURE: 98.2 F | HEIGHT: 58 IN | WEIGHT: 182.98 LBS

## 2020-03-03 DIAGNOSIS — Z00.00 ENCOUNTER FOR GENERAL ADULT MEDICAL EXAMINATION W/OUT ABNORMAL FINDINGS: ICD-10-CM

## 2020-03-03 PROCEDURE — 99212 OFFICE O/P EST SF 10 MIN: CPT

## 2020-03-03 PROCEDURE — 93000 ELECTROCARDIOGRAM COMPLETE: CPT

## 2020-03-03 RX ORDER — METOPROLOL SUCCINATE 50 MG/1
50 TABLET, EXTENDED RELEASE ORAL DAILY
Qty: 1 | Refills: 1 | Status: COMPLETED | COMMUNITY
Start: 2019-01-07 | End: 2020-03-03

## 2020-03-03 NOTE — PHYSICAL EXAM
[General Appearance - Well Developed] : well developed [Normal Appearance] : normal appearance [Well Groomed] : well groomed [General Appearance - Well Nourished] : well nourished [No Deformities] : no deformities [General Appearance - In No Acute Distress] : no acute distress [Normal Conjunctiva] : the conjunctiva exhibited no abnormalities [Eyelids - No Xanthelasma] : the eyelids demonstrated no xanthelasmas [Normal Oral Mucosa] : normal oral mucosa [No Oral Pallor] : no oral pallor [No Oral Cyanosis] : no oral cyanosis [Normal Jugular Venous A Waves Present] : normal jugular venous A waves present [Normal Jugular Venous V Waves Present] : normal jugular venous V waves present [No Jugular Venous Mora A Waves] : no jugular venous mora A waves [Respiration, Rhythm And Depth] : normal respiratory rhythm and effort [Exaggerated Use Of Accessory Muscles For Inspiration] : no accessory muscle use [Auscultation Breath Sounds / Voice Sounds] : lungs were clear to auscultation bilaterally [Heart Sounds] : normal S1 and S2 [Heart Rate And Rhythm] : heart rate and rhythm were normal [Murmurs] : no murmurs present [Abdomen Tenderness] : non-tender [Abdomen Soft] : soft [Abdomen Mass (___ Cm)] : no abdominal mass palpated [Abnormal Walk] : normal gait [Gait - Sufficient For Exercise Testing] : the gait was sufficient for exercise testing [Nail Clubbing] : no clubbing of the fingernails [Cyanosis, Localized] : no localized cyanosis [Petechial Hemorrhages (___cm)] : no petechial hemorrhages [Skin Color & Pigmentation] : normal skin color and pigmentation [] : no rash [No Venous Stasis] : no venous stasis [Skin Lesions] : no skin lesions [No Skin Ulcers] : no skin ulcer [No Xanthoma] : no  xanthoma was observed [Oriented To Time, Place, And Person] : oriented to person, place, and time [Affect] : the affect was normal [Mood] : the mood was normal [No Anxiety] : not feeling anxious

## 2020-03-03 NOTE — HISTORY OF PRESENT ILLNESS
[Preoperative Visit] : for a medical evaluation prior to surgery [Surgeon Name ___] : surgeon: [unfilled] [Cardiovascular Disease] : cardiovascular disease [Thromboembolic Problems] : thromboembolic problems [Prior Anesthesia] : Prior anesthesia [Electrocardiogram] : ~T an ECG ~C was performed [Echocardiogram] : ~T an echocardiogram ~C was performed [Cardiovascular Stress Test] : a cardiac stress test ~T ~C was performed [Metabolic Capacity ___Mets%] : The patient has a metabolic capacity of [unfilled] Mets%  [Good] : Good [Fever] : no fever [Chills] : no chills [Chest Pain] : no chest pain [Fatigue] : no fatigue [Cough] : no cough [Dysuria] : no dysuria [Dyspnea] : no dyspnea [Urinary Frequency] : no urinary frequency [Nausea] : no nausea [Vomiting] : no vomiting [Diarrhea] : no diarrhea [Abdominal Pain] : no abdominal pain [Easy Bruising] : no easy bruising [Lower Extremity Swelling] : no lower extremity swelling [Poor Exercise Tolerance] : no poor exercise tolerance [Diabetes] : no diabetes [Pulmonary Disease] : no pulmonary disease [Anti-Platelet Agents] : no anti-platelet agents [Nicotine Dependence] : no nicotine dependence [Alcohol Use] : no  alcohol use [Renal Disease] : no renal disease [GI Disease] : no gastrointestinal disease [Sleep Apnea] : no sleep apnea [Frequent use of NSAIDs] : no use of NSAIDs [Transfusion Reaction] : no transfusion reaction [Impaired Immunity] : no impaired immunity [Steroid Use in Last 6 Months] : no steroid use in the last six months [Frequent Aspirin Use] : no frequent aspirin use [Prev Anesthesia Reaction] : no previous anesthesia reaction [Anesthesia Reaction] : no anesthesia reaction [Sudden Death] : no sudden death [Clotting Disorder] : no clotting disorder [Bleeding Disorder] : no bleeding disorder [de-identified] : Complex dental procedures [FreeTextEntry1] : 59 y/o woman non smoker, afib on ac here for preop evakl for dental work\par EKG performed today reviewed (in chart)\par usual state of health, no new complaints\par SOB - no\par chest pain - no\par location: chest\par duration: none\par modifying factors: none\par timing: none\par severity: 0/10\par Medications: the patient is adherent with his medication regimen. denies medication side effects. \par Disease Management: the patient is doing well with goals. \par

## 2020-03-03 NOTE — DISCUSSION/SUMMARY
[Procedure Low Risk] : the procedure risk is low [Patient Low Risk] : the patient is a low surgical risk [Optimized for Surgery] : the patient is optimized for surgery [As per surgery] : as per surgery [Continue] : Continue medications as currently directed [FreeTextEntry1] : RCRI 0 - low risk pt for low risk procedure\par afib controlled \par hold eliquis 3d prior to each dental procedure\par no further cardiac work up needed

## 2020-06-15 ENCOUNTER — APPOINTMENT (OUTPATIENT)
Dept: HEART AND VASCULAR | Facility: CLINIC | Age: 60
End: 2020-06-15

## 2020-06-18 NOTE — DISCHARGE NOTE ADULT - NS AS DC PROVIDER CONTACT Y/N MULTI
Detail Level: Detailed
Quality 130: Documentation Of Current Medications In The Medical Record: Current Medications Documented
Yes

## 2020-07-09 ENCOUNTER — APPOINTMENT (OUTPATIENT)
Dept: ENDOCRINOLOGY | Facility: CLINIC | Age: 60
End: 2020-07-09
Payer: COMMERCIAL

## 2020-07-09 VITALS
BODY MASS INDEX: 38.87 KG/M2 | SYSTOLIC BLOOD PRESSURE: 143 MMHG | DIASTOLIC BLOOD PRESSURE: 82 MMHG | HEART RATE: 58 BPM | WEIGHT: 186 LBS

## 2020-07-09 PROCEDURE — 99213 OFFICE O/P EST LOW 20 MIN: CPT

## 2020-07-10 NOTE — ASSESSMENT
[Levothyroxine] : The patient was instructed to take Levothyroxine on an empty stomach, separate from vitamins, and wait at least 30 minutes before eating [FreeTextEntry1] : 61 y/o F pt with:\par \par 1. Hx of secondary hypothyroidism following MEANS ablation for Graves' disease on 3/25/19:\par Pt was recently seen her by PCP with labs that showed  hyperthyroxinemia, and low TSH. \par She ran out of Levothyroxine for ~2 weeks and she appears to be euthyroid. \par Recommend pt to decrease Levothyroxine to 88 mcg from 100 mcg, and send TFT at her next endo appt\par \par Return in: 2 months

## 2020-07-10 NOTE — PHYSICAL EXAM
[Alert] : alert [Normal Sclera/Conjunctiva] : normal sclera/conjunctiva [Normal Outer Ear/Nose] : the ears and nose were normal in appearance [Regular Rhythm] : with a regular rhythm [No Respiratory Distress] : no respiratory distress [Normal Rate] : heart rate was normal [No Edema] : no peripheral edema [Spine Straight] : spine straight [Normal Bowel Sounds] : normal bowel sounds [No Stigmata of Cushings Syndrome] : no stigmata of Cushings Syndrome [No Rash] : no rash [Normal Gait] : normal gait [Oriented x3] : oriented to person, place, and time [Normal Reflexes] : deep tendon reflexes were 2+ and symmetric [de-identified] : thyroid glands not palpated

## 2020-07-10 NOTE — ADDENDUM
[FreeTextEntry1] : I, Leighton Gottlieb, acted solely as a scribe for Dr. Kb Dodge on this date. 07/09/2020.

## 2020-07-10 NOTE — HISTORY OF PRESENT ILLNESS
[FreeTextEntry1] : 61 y/o F pt, with Hx of Hypothyroidism developed subsequently s/p MEANS ablation on 3/25/19 for Grave's disease. \par Other PMHx: Obesity\par Last cardiologist visit: in 11/2019\par \par 07/09/2020 \par I spoke with patient's PCP ~3 weeks ago because patient's recent TSH was low (0.04) and Free T4 was high normal (1.6). \par \par Pt presents today for thyroid f/u, feeling well with no acute complaints. She has not been taking Levothyroxine for past 2-3 weeks. She has gained 4 lbs in past 4 months. Denies palpitations, nervousness,GI disturbances\par \par Current Medications: Eliquis BID, Metoprolol 50mg QD, Levothyroxine 100mcg \par \par Most recent lab studies:\par - 6/11/20: TSH 0.04, Free T4 1.6\par - 12/3/19: TSH 0.37, Free T4 1.4, Free T3 3.22\par \par

## 2020-07-10 NOTE — END OF VISIT
[FreeTextEntry3] : All medical record entries made by the Scribe were at my, Dr. Kb Dodge, direction and personally dictated by me on 07/09/2020. I have reviewed the chart and agree that the record accurately reflects my personal performance of the history, physical exam, assessment and plan. I have also personally directed, reviewed and agreed with the chart.  [Time Spent: ___ minutes] : I have spent [unfilled] minutes of time on the encounter. [>50% of the face to face encounter time was spent on counseling and/or coordination of care for ___] : Greater than 50% of the face to face encounter time was spent on counseling and/or coordination of care for [unfilled]

## 2020-07-15 ENCOUNTER — APPOINTMENT (OUTPATIENT)
Dept: ENDOCRINOLOGY | Facility: CLINIC | Age: 60
End: 2020-07-15

## 2020-08-21 ENCOUNTER — APPOINTMENT (OUTPATIENT)
Dept: HEART AND VASCULAR | Facility: CLINIC | Age: 60
End: 2020-08-21
Payer: COMMERCIAL

## 2020-08-21 VITALS
OXYGEN SATURATION: 97 % | TEMPERATURE: 97.8 F | WEIGHT: 192.99 LBS | HEIGHT: 57.09 IN | HEART RATE: 66 BPM | SYSTOLIC BLOOD PRESSURE: 122 MMHG | BODY MASS INDEX: 41.64 KG/M2 | DIASTOLIC BLOOD PRESSURE: 80 MMHG

## 2020-08-21 DIAGNOSIS — E66.9 OBESITY, UNSPECIFIED: ICD-10-CM

## 2020-08-21 PROCEDURE — 93000 ELECTROCARDIOGRAM COMPLETE: CPT

## 2020-08-21 PROCEDURE — 99214 OFFICE O/P EST MOD 30 MIN: CPT

## 2020-08-21 NOTE — PHYSICAL EXAM
[General Appearance - Well Developed] : well developed [General Appearance - Well Nourished] : well nourished [Well Groomed] : well groomed [Normal Appearance] : normal appearance [No Deformities] : no deformities [Eyelids - No Xanthelasma] : the eyelids demonstrated no xanthelasmas [General Appearance - In No Acute Distress] : no acute distress [Normal Conjunctiva] : the conjunctiva exhibited no abnormalities [No Oral Pallor] : no oral pallor [Normal Oral Mucosa] : normal oral mucosa [Normal Jugular Venous A Waves Present] : normal jugular venous A waves present [No Oral Cyanosis] : no oral cyanosis [Normal Jugular Venous V Waves Present] : normal jugular venous V waves present [Respiration, Rhythm And Depth] : normal respiratory rhythm and effort [Exaggerated Use Of Accessory Muscles For Inspiration] : no accessory muscle use [No Jugular Venous Mora A Waves] : no jugular venous mora A waves [Heart Sounds] : normal S1 and S2 [Heart Rate And Rhythm] : heart rate and rhythm were normal [Auscultation Breath Sounds / Voice Sounds] : lungs were clear to auscultation bilaterally [Abdomen Soft] : soft [Murmurs] : no murmurs present [Abdomen Tenderness] : non-tender [Gait - Sufficient For Exercise Testing] : the gait was sufficient for exercise testing [Abdomen Mass (___ Cm)] : no abdominal mass palpated [Abnormal Walk] : normal gait [Petechial Hemorrhages (___cm)] : no petechial hemorrhages [Nail Clubbing] : no clubbing of the fingernails [Cyanosis, Localized] : no localized cyanosis [Skin Color & Pigmentation] : normal skin color and pigmentation [No Venous Stasis] : no venous stasis [] : no rash [No Skin Ulcers] : no skin ulcer [No Xanthoma] : no  xanthoma was observed [Skin Lesions] : no skin lesions

## 2020-08-21 NOTE — HISTORY OF PRESENT ILLNESS
[Preoperative Visit] : for a medical evaluation prior to surgery [Scheduled Procedure ___] : a [unfilled] [Date of Surgery ___] : on [unfilled] [Surgeon Name ___] : surgeon: [unfilled] [Prev Anesthesia Reaction] : previous anesthesia reaction [Prior Anesthesia] : Prior anesthesia [Good] : Good [Electrocardiogram] : ~T an ECG ~C was performed [Fever] : no fever [Chest Pain] : no chest pain [Diabetes] : no diabetes [Dyspnea] : no dyspnea [Cardiovascular Disease] : no cardiovascular disease [Cough] : no cough [Anti-Platelet Agents] : no anti-platelet agents [Renal Disease] : no renal disease [GI Disease] : no gastrointestinal disease [Clotting Disorder] : no clotting disorder [Thromboembolic Problems] : no thromboembolic problems [Anesthesia Reaction] : no anesthesia reaction [de-identified] : tooth aches  [FreeTextEntry1] : 60 F afib on eliquis, graves disease s/p MEANS, now hypothyroid on replacement therapy, for preop prior to dental procedure (cavities and possible tooth extraction).\par \par Last dose eliquis evening of 8/18/20.  \par No cp or sob. No palpitations.  has a lot of tooth pain bilaterally\par Able to walk 2 flights of stairs without sx. \par \par EKG: NSR

## 2020-08-21 NOTE — DISCUSSION/SUMMARY
[Patient Low Risk] : the patient is a low surgical risk [Optimized for Surgery] : the patient is optimized for surgery [Procedure Low Risk] : the procedure risk is low [FreeTextEntry1] : 60 F \par \par RCRI Score 0.  \par Good functional status.  Able to achieve well over 4 METS without limitation\par EKG: NSR, no ischemic changes\par No signs/sx of heart failure \par - no further cardiac workup needed prior to low risk dental procedure.  per patient this is to fill cavities and possible tooth extraction\par - eliquis has been held since 8/19 for planned procedure 8/22/20, resume postoperatively when bleeding risk acceptable as per dentist \par - labs \par

## 2020-08-24 LAB
25(OH)D3 SERPL-MCNC: 26.1 NG/ML
ALBUMIN SERPL ELPH-MCNC: 4.4 G/DL
ALP BLD-CCNC: 100 U/L
ALT SERPL-CCNC: 20 U/L
ANION GAP SERPL CALC-SCNC: 14 MMOL/L
APTT BLD: 31.7 SEC
AST SERPL-CCNC: 21 U/L
BASOPHILS # BLD AUTO: 0.05 K/UL
BASOPHILS NFR BLD AUTO: 0.5 %
BILIRUB SERPL-MCNC: <0.2 MG/DL
BUN SERPL-MCNC: 7 MG/DL
CALCIUM SERPL-MCNC: 10.3 MG/DL
CHLORIDE SERPL-SCNC: 104 MMOL/L
CHOLEST SERPL-MCNC: 180 MG/DL
CHOLEST/HDLC SERPL: 3.2 RATIO
CO2 SERPL-SCNC: 23 MMOL/L
CREAT SERPL-MCNC: 0.88 MG/DL
EOSINOPHIL # BLD AUTO: 0.44 K/UL
EOSINOPHIL NFR BLD AUTO: 4.7 %
ESTIMATED AVERAGE GLUCOSE: 105 MG/DL
GLUCOSE SERPL-MCNC: 116 MG/DL
HBA1C MFR BLD HPLC: 5.3 %
HCT VFR BLD CALC: 40.3 %
HDLC SERPL-MCNC: 56 MG/DL
HGB BLD-MCNC: 12.4 G/DL
IMM GRANULOCYTES NFR BLD AUTO: 0.2 %
INR PPP: 0.97 RATIO
LDLC SERPL CALC-MCNC: 81 MG/DL
LYMPHOCYTES # BLD AUTO: 2.92 K/UL
LYMPHOCYTES NFR BLD AUTO: 30.9 %
MAGNESIUM SERPL-MCNC: 2.1 MG/DL
MAN DIFF?: NORMAL
MCHC RBC-ENTMCNC: 29.8 PG
MCHC RBC-ENTMCNC: 30.8 GM/DL
MCV RBC AUTO: 96.9 FL
MONOCYTES # BLD AUTO: 0.67 K/UL
MONOCYTES NFR BLD AUTO: 7.1 %
NEUTROPHILS # BLD AUTO: 5.35 K/UL
NEUTROPHILS NFR BLD AUTO: 56.6 %
NT-PROBNP SERPL-MCNC: 33 PG/ML
PLATELET # BLD AUTO: 342 K/UL
POTASSIUM SERPL-SCNC: 4.3 MMOL/L
PROT SERPL-MCNC: 7.3 G/DL
PT BLD: 11.5 SEC
RBC # BLD: 4.16 M/UL
RBC # FLD: 14.1 %
SODIUM SERPL-SCNC: 141 MMOL/L
TRIGL SERPL-MCNC: 216 MG/DL
TSH SERPL-ACNC: 0.32 UIU/ML
WBC # FLD AUTO: 9.45 K/UL

## 2020-08-26 ENCOUNTER — RX RENEWAL (OUTPATIENT)
Age: 60
End: 2020-08-26

## 2020-09-22 ENCOUNTER — APPOINTMENT (OUTPATIENT)
Dept: ENDOCRINOLOGY | Facility: CLINIC | Age: 60
End: 2020-09-22
Payer: COMMERCIAL

## 2020-09-22 VITALS
HEIGHT: 69.09 IN | DIASTOLIC BLOOD PRESSURE: 86 MMHG | BODY MASS INDEX: 28.29 KG/M2 | HEART RATE: 61 BPM | SYSTOLIC BLOOD PRESSURE: 167 MMHG | WEIGHT: 191 LBS

## 2020-09-22 PROCEDURE — 99214 OFFICE O/P EST MOD 30 MIN: CPT

## 2020-09-24 NOTE — ASSESSMENT
[Levothyroxine] : The patient was instructed to take Levothyroxine on an empty stomach, separate from vitamins, and wait at least 30 minutes before eating [FreeTextEntry1] : 59 y/o F pt with:\par \par 1. Hypothyroidism following MEANS ablation for Graves' disease on 3/25/19: \par Pt became euthyroid and in 6/2020 she was found to have high normal levels of Free T4 with suppressed TSH. Therefore, her LT4 was decreased to 88 mcg. Today pt appears to be euthyroid. Will send for TFTs.\par \par 2. Obesity, BMI 41:\par Pt is at increased risk for obesity related comorbidities including metabolic disorders. \par Recommend pt improve her lifestyle and diet.\par \par Return in 4 months for further assessment.  [Weight Loss] : weight loss

## 2020-09-24 NOTE — ADDENDUM
[FreeTextEntry1] : I, Trish Jules, acted solely as a scribe for Dr. Kb Dodge on this date. 09/22/2020.

## 2020-09-24 NOTE — REVIEW OF SYSTEMS
[As Noted in HPI] : as noted in HPI [Recent Weight Gain (___ Lbs)] : recent weight gain: [unfilled] lbs [Negative] : Heme/Lymph [Recent Weight Loss (___ Lbs)] : no recent weight loss [Palpitations] : no palpitations

## 2020-09-24 NOTE — END OF VISIT
[FreeTextEntry3] : All medical record entries made by the Scribe were at my, Dr. Kb Dodge, direction and personally dictated by me on 09/22/2020. I have reviewed the chart and agree that the record accurately reflects my personal performance of the history, physical exam, assessment and plan. I have also personally directed, reviewed and agreed with the chart.  [Time Spent: ___ minutes] : I have spent [unfilled] minutes of time on the encounter. [>50% of the face to face encounter time was spent on counseling and/or coordination of care for ___] : Greater than 50% of the face to face encounter time was spent on counseling and/or coordination of care for [unfilled]

## 2020-09-24 NOTE — HISTORY OF PRESENT ILLNESS
[FreeTextEntry1] : 61 y/o F pt, with Hx of Hypothyroidism developed subsequently s/p MEANS ablation on 3/25/19 for Grave's disease. \par Other PMHx: Obesity\par Last cardiologist visit: in 11/2019\par \par 07/09/2020 \par I spoke with patient's PCP ~3 weeks ago because patient's recent TSH was low (0.04) and Free T4 was high normal (1.6). \par \par Pt presents today for thyroid f/u, feeling well with no acute complaints. She has not been taking Levothyroxine for past 2-3 weeks. She has gained 4 lbs in past 4 months. Denies palpitations, nervousness,GI disturbances\par \par 9/22/20\par In 6/11/20 her Free T4 was found to be high normal 1.6 with suppressed TSH. Therefore her LT4 was held for a couple of days and then restarted at 88mcg.\par \par Today pt presents for thyroid f/u, feeling well with no major physical complaints. \par Pt gained 5lbs since 7/2020.\par Denies palpation and weight loss. \par \par Current Medications: Eliquis 5mg BID, Metoprolol 50mg QD, Levothyroxine 88mcg \par \par Most recent lab studies:\par - 8/21/20 A1c 5.3%, TSH 0.32,  s. creat 0.88, LDL-c 81, , Vit D 25- OH 26.1, \par - 6/11/20: TSH 0.04, Free T4 1.6\par - 12/3/19: TSH 0.37, Free T4 1.4, Free T3 3.22

## 2020-09-24 NOTE — PHYSICAL EXAM
[Alert] : alert [Normal Sclera/Conjunctiva] : normal sclera/conjunctiva [Normal Outer Ear/Nose] : the ears and nose were normal in appearance [No Respiratory Distress] : no respiratory distress [Normal Rate] : heart rate was normal [Regular Rhythm] : with a regular rhythm [No Edema] : no peripheral edema [Normal Bowel Sounds] : normal bowel sounds [Spine Straight] : spine straight [No Stigmata of Cushings Syndrome] : no stigmata of Cushings Syndrome [Normal Gait] : normal gait [No Rash] : no rash [Normal Reflexes] : deep tendon reflexes were 2+ and symmetric [Oriented x3] : oriented to person, place, and time [de-identified] : thyroid glands not palpated

## 2020-12-01 ENCOUNTER — APPOINTMENT (OUTPATIENT)
Dept: ENDOCRINOLOGY | Facility: CLINIC | Age: 60
End: 2020-12-01
Payer: COMMERCIAL

## 2020-12-01 VITALS
BODY MASS INDEX: 42.5 KG/M2 | HEIGHT: 57 IN | DIASTOLIC BLOOD PRESSURE: 100 MMHG | HEART RATE: 64 BPM | SYSTOLIC BLOOD PRESSURE: 163 MMHG | WEIGHT: 197 LBS

## 2020-12-01 PROCEDURE — 99214 OFFICE O/P EST MOD 30 MIN: CPT

## 2020-12-01 PROCEDURE — 99072 ADDL SUPL MATRL&STAF TM PHE: CPT

## 2020-12-03 NOTE — HISTORY OF PRESENT ILLNESS
[FreeTextEntry1] : 61 y/o F pt, with Hx of Hypothyroidism developed subsequently s/p MEANS ablation on 3/25/19 for Grave's disease. \par Other PMHx: Obesity\par Last cardiologist visit: in 11/2019\par \par 07/09/2020 \par I spoke with patient's PCP ~3 weeks ago because patient's recent TSH was low (0.04) and Free T4 was high normal (1.6). \par \par Pt presents today for thyroid f/u, feeling well with no acute complaints. She has not been taking Levothyroxine for past 2-3 weeks. She has gained 4 lbs in past 4 months. Denies palpitations, nervousness,GI disturbances\par \par 9/22/20\par In 6/11/20 her Free T4 was found to be high normal 1.6 with suppressed TSH. Therefore her LT4 was held for a couple of days and then restarted at 88mcg.\par \par Today pt presents for thyroid f/u, feeling well with no major physical complaints. \par Pt gained 5lbs since 7/2020.\par Denies palpation and weight loss. \par \par 12/1/20\par Today pt presents for thyroid f/u, feeling well with no major physical complaints. \par Pt states she got tested for COVID- 19 and it was negative. \par She states she is staying at home and is eating. \par Denies palpitations.\par \par Current Medications: Eliquis 5mg BID, Metoprolol 50mg QD, Levothyroxine 88mcg \par \par Most recent lab studies:\par - 10/1/20 A1c 5.6%, TSH 13.83, Free T4 1.0, Ca 10.5, s. creat 0.99, \par - 8/21/20 A1c 5.3%, TSH 0.32,  s. creat 0.88, LDL-c 81, , Vit D 25- OH 26.1, \par - 6/11/20: TSH 0.04, Free T4 1.6\par - 12/3/19: TSH 0.37, Free T4 1.4, Free T3 3.22

## 2020-12-03 NOTE — ADDENDUM
[FreeTextEntry1] : I, Trish Jules, acted solely as a scribe for Dr. Kb Dodge on this date. 12/01/2020.

## 2020-12-03 NOTE — ASSESSMENT
[Weight Loss] : weight loss [Levothyroxine] : The patient was instructed to take Levothyroxine on an empty stomach, separate from vitamins, and wait at least 30 minutes before eating [FreeTextEntry1] : 59 y/o F pt with:\par \par 1. Hypothyroidism following MEANS ablation for Graves' disease on 3/25/19: \par Pt is on LT4 88mcg, and appears euthyroid. She does not have any other endocrine autoimmune disorder. \par Will send for TFTs.\par \par 2. Obesity:\par Explained the benefit of weight loss and the risk of complications from obesity including metabolic disorders, fatty liver disease, and OA. Refer pt to see RD and exercise specialist. Will send metabolic profile, lipid profile, and A1c. \par \par Return in 6 months.  [Importance of Diet and Exercise] : importance of diet and exercise to improve glycemic control, achieve weight loss and improve cardiovascular health

## 2020-12-03 NOTE — END OF VISIT
[FreeTextEntry3] : All medical record entries made by the Scribe were at my, Dr. Kb Dodge, direction and personally dictated by me on 12/01/2020. I have reviewed the chart and agree that the record accurately reflects my personal performance of the history, physical exam, assessment and plan. I have also personally directed, reviewed and agreed with the chart.  [Time Spent: ___ minutes] : I have spent [unfilled] minutes of time on the encounter. [>50% of the face to face encounter time was spent on counseling and/or coordination of care for ___] : Greater than 50% of the face to face encounter time was spent on counseling and/or coordination of care for [unfilled]

## 2020-12-03 NOTE — PHYSICAL EXAM
[Alert] : alert [Normal Sclera/Conjunctiva] : normal sclera/conjunctiva [Normal Outer Ear/Nose] : the ears and nose were normal in appearance [No Respiratory Distress] : no respiratory distress [Normal Rate] : heart rate was normal [Regular Rhythm] : with a regular rhythm [No Edema] : no peripheral edema [Normal Bowel Sounds] : normal bowel sounds [Spine Straight] : spine straight [No Stigmata of Cushings Syndrome] : no stigmata of Cushings Syndrome [Normal Gait] : normal gait [No Rash] : no rash [Normal Reflexes] : deep tendon reflexes were 2+ and symmetric [Oriented x3] : oriented to person, place, and time [de-identified] : thyroid glands not palpated

## 2020-12-10 ENCOUNTER — NON-APPOINTMENT (OUTPATIENT)
Age: 60
End: 2020-12-10

## 2021-03-02 ENCOUNTER — APPOINTMENT (OUTPATIENT)
Dept: ENDOCRINOLOGY | Facility: CLINIC | Age: 61
End: 2021-03-02

## 2021-03-22 ENCOUNTER — APPOINTMENT (OUTPATIENT)
Dept: ENDOCRINOLOGY | Facility: CLINIC | Age: 61
End: 2021-03-22
Payer: COMMERCIAL

## 2021-03-22 VITALS
DIASTOLIC BLOOD PRESSURE: 85 MMHG | BODY MASS INDEX: 42.41 KG/M2 | HEART RATE: 50 BPM | SYSTOLIC BLOOD PRESSURE: 156 MMHG | WEIGHT: 196 LBS

## 2021-03-22 LAB
25(OH)D3 SERPL-MCNC: 20.2 NG/ML
ALBUMIN SERPL ELPH-MCNC: 4.6 G/DL
ALP BLD-CCNC: 88 U/L
ALT SERPL-CCNC: 16 U/L
ANION GAP SERPL CALC-SCNC: 10 MMOL/L
AST SERPL-CCNC: 19 U/L
BILIRUB SERPL-MCNC: 0.2 MG/DL
BUN SERPL-MCNC: 11 MG/DL
CALCIUM SERPL-MCNC: 9.5 MG/DL
CHLORIDE SERPL-SCNC: 102 MMOL/L
CO2 SERPL-SCNC: 29 MMOL/L
CREAT SERPL-MCNC: 0.91 MG/DL
ESTIMATED AVERAGE GLUCOSE: 120 MG/DL
GLUCOSE SERPL-MCNC: 90 MG/DL
HBA1C MFR BLD HPLC: 5.8 %
POTASSIUM SERPL-SCNC: 4.1 MMOL/L
PROT SERPL-MCNC: 7.5 G/DL
SODIUM SERPL-SCNC: 141 MMOL/L
T4 FREE SERPL-MCNC: 0.2 NG/DL
TSH SERPL-ACNC: 66.8 UIU/ML

## 2021-03-22 PROCEDURE — 99214 OFFICE O/P EST MOD 30 MIN: CPT

## 2021-03-22 PROCEDURE — 99072 ADDL SUPL MATRL&STAF TM PHE: CPT

## 2021-03-22 NOTE — END OF VISIT
[FreeTextEntry3] : All medical record entries made by the Scribe were at my, Dr. Kb Dodge, direction and personally dictated by me on 03/22/2021. I have reviewed the chart and agree that the record accurately reflects my personal performance of the history, physical exam, assessment and plan. I have also personally directed, reviewed and agreed with the chart.  [Time Spent: ___ minutes] : I have spent [unfilled] minutes of time on the encounter.

## 2021-03-22 NOTE — REVIEW OF SYSTEMS
[Negative] : Heme/Lymph [Palpitations] : no palpitations [Constipation] : no constipation [Polyuria] : no polyuria

## 2021-03-22 NOTE — ASSESSMENT
[Importance of Diet and Exercise] : importance of diet and exercise to improve glycemic control, achieve weight loss and improve cardiovascular health [Weight Loss] : weight loss [Levothyroxine] : The patient was instructed to take Levothyroxine on an empty stomach, separate from vitamins, and wait at least 30 minutes before eating [FreeTextEntry1] : 62 y/o F pt with:\par \par 1. Hypothyroidism following MEANS ablation for Graves' disease on 3/25/19: \par She does not have any other signs or symptoms of any other endocrine autoimmune disorder. \par Pt ran out of LT4 ~ 2 months ago and denies symptoms of hypothyroidism. Her PE shows round face with periorbital edema and raspy voice. Strongly advised pt to not stop her Thyroid supplementations. \par Will send LT4 88mcg for TFTs.\par \par 2. Obesity:\par Pt brought an exercise device to show that she will use it to reduce her weight. \par Explained the benefit of weight loss and the risk of complications from obesity including metabolic disorders, fatty liver disease, and OA. Had a brief discussion on surgical weight reduction options. \par Will send metabolic profile, lipid profile, and A1c. \par Refer pt to see RD again.\par \par Return in 1 yr.

## 2021-03-22 NOTE — PHYSICAL EXAM
[Alert] : alert [Normal Sclera/Conjunctiva] : normal sclera/conjunctiva [Normal Outer Ear/Nose] : the ears and nose were normal in appearance [No Respiratory Distress] : no respiratory distress [Normal Rate] : heart rate was normal [Regular Rhythm] : with a regular rhythm [No Edema] : no peripheral edema [Normal Bowel Sounds] : normal bowel sounds [Spine Straight] : spine straight [No Stigmata of Cushings Syndrome] : no stigmata of Cushings Syndrome [Normal Gait] : normal gait [No Rash] : no rash [Normal Reflexes] : deep tendon reflexes were 2+ and symmetric [Oriented x3] : oriented to person, place, and time [de-identified] : thyroid glands not palpated, thyroid bed normal with no mass and no nodules

## 2021-03-22 NOTE — HISTORY OF PRESENT ILLNESS
[FreeTextEntry1] : 62 y/o F pt, with Hx of Hypothyroidism developed subsequently s/p MEANS ablation on 3/25/19 for Grave's disease. \par Other PMHx: Obesity\par \par 07/09/2020 \par I spoke with patient's PCP ~3 weeks ago because patient's recent TSH was low (0.04) and Free T4 was high normal (1.6). \par \par Pt presents today for thyroid f/u, feeling well with no acute complaints. She has not been taking Levothyroxine for past 2-3 weeks. She has gained 4 lbs in past 4 months. Denies palpitations, nervousness,GI disturbances\par \par 9/22/20\par In 6/11/20 her Free T4 was found to be high normal 1.6 with suppressed TSH. Therefore her LT4 was held for a couple of days and then restarted at 88mcg.\par \par Today pt presents for thyroid f/u, feeling well with no major physical complaints. \par Pt gained 5lbs since 7/2020.\par Denies palpation and weight loss. \par \par 12/1/20\par Today pt presents for thyroid f/u, feeling well with no major physical complaints. \par Pt states she got tested for COVID- 19 and it was negative. \par She states she is staying at home and is eating. \par Denies palpitations.\par \par 3/22/21\par Today pt presents for thyroid f/u, feeling well with no major physical complaints. She notes she ran out of LT4 2 months ago.\par Pt is wondering if its okay to take weight loss tea. \par Denies palpitations, constipation, and polyuria.\par \par Current Medications: Eliquis 5mg BID, Metoprolol 50mg QD, Levothyroxine 88mcg \par \par Most recent lab studies:\par - 12/22/20 A1c 5.6%, TSH 2.71, Free T4 1.3, s. creat 0.74, Ca 9.7, Vit D 25- OH 16, \par - 10/1/20 A1c 5.6%, TSH 13.83, Free T4 1.0, Ca 10.5, s. creat 0.99, \par - 8/21/20 A1c 5.3%, TSH 0.32,  s. creat 0.88, LDL-c 81, , Vit D 25- OH 26.1, \par - 6/11/20: TSH 0.04, Free T4 1.6\par - 12/3/19: TSH 0.37, Free T4 1.4, Free T3 3.22

## 2021-03-22 NOTE — ADDENDUM
[FreeTextEntry1] : I, Trish Jules, acted solely as a scribe for Dr. Kb Dodge on this date. 03/22/2021.

## 2021-06-01 ENCOUNTER — APPOINTMENT (OUTPATIENT)
Dept: ENDOCRINOLOGY | Facility: CLINIC | Age: 61
End: 2021-06-01

## 2022-01-11 ENCOUNTER — APPOINTMENT (OUTPATIENT)
Age: 62
End: 2022-01-11
Payer: COMMERCIAL

## 2022-01-11 VITALS
OXYGEN SATURATION: 98 % | BODY MASS INDEX: 38.2 KG/M2 | DIASTOLIC BLOOD PRESSURE: 80 MMHG | WEIGHT: 182 LBS | TEMPERATURE: 98.5 F | HEIGHT: 58 IN | RESPIRATION RATE: 16 BRPM | HEART RATE: 61 BPM | SYSTOLIC BLOOD PRESSURE: 120 MMHG

## 2022-01-11 DIAGNOSIS — Z12.11 ENCOUNTER FOR SCREENING FOR MALIGNANT NEOPLASM OF COLON: ICD-10-CM

## 2022-01-11 PROCEDURE — 99203 OFFICE O/P NEW LOW 30 MIN: CPT

## 2022-01-11 NOTE — PHYSICAL EXAM
[General Appearance - Alert] : alert [General Appearance - In No Acute Distress] : in no acute distress [Sclera] : the sclera and conjunctiva were normal [Outer Ear] : the ears and nose were normal in appearance [Neck Appearance] : the appearance of the neck was normal [] : no respiratory distress [Heart Rate And Rhythm] : heart rate was normal and rhythm regular [Abdomen Soft] : soft [Abdomen Tenderness] : non-tender [No CVA Tenderness] : no ~M costovertebral angle tenderness [Abnormal Walk] : normal gait [Skin Color & Pigmentation] : normal skin color and pigmentation [No Focal Deficits] : no focal deficits [Oriented To Time, Place, And Person] : oriented to person, place, and time [Impaired Insight] : insight and judgment were intact

## 2022-01-12 PROBLEM — Z12.11 COLON CANCER SCREENING: Status: ACTIVE | Noted: 2022-01-12

## 2022-01-12 NOTE — HISTORY OF PRESENT ILLNESS
[de-identified] : 62 yo F Hypothyroid, pre DM, afib (on eliquis) presenting for colon cancer screening.\par \par - No GI complaints today\par - Denies rectal bleeding, melena, constipation, diarrhea, abdominal pain, normal consistency. No dysphagia, reflux.\par - Recalls once having small blood on tp with straining\par \par Last colonoscopy 10 years ago, does not recall where or who performed. Does not recall results.\par Never had EGD\par \par Tobacco: quit 10 years ago\par Etoh: few drinks/ week\par AC/ Nsaids: Eliquis BID, no NAIDS\par Family Hx: Grandmother uterine cancer, no GI malignancy

## 2022-01-12 NOTE — ASSESSMENT
[FreeTextEntry1] : 60 yo F Hypothyroid, pre DM, afib (on eliquis) presenting for colon cancer screening.\par \par #Colon cancer screening\par - Plan for colonoscopy. R/B/I/A discussed and pt agrees.\par - Cardiac clearance, follows with Dr. Marroquin for eliquis\par - Dietary counseling provided

## 2022-01-27 NOTE — REASON FOR VISIT
[Follow-Up: _____] : a [unfilled] follow-up visit [FreeTextEntry1] : hyperthyroidism [Negative] : Integumentary [de-identified] : see HPI

## 2022-02-04 ENCOUNTER — APPOINTMENT (OUTPATIENT)
Dept: HEART AND VASCULAR | Facility: CLINIC | Age: 62
End: 2022-02-04
Payer: COMMERCIAL

## 2022-02-04 ENCOUNTER — NON-APPOINTMENT (OUTPATIENT)
Age: 62
End: 2022-02-04

## 2022-02-04 VITALS
OXYGEN SATURATION: 96 % | SYSTOLIC BLOOD PRESSURE: 148 MMHG | HEART RATE: 59 BPM | WEIGHT: 184.99 LBS | DIASTOLIC BLOOD PRESSURE: 85 MMHG | HEIGHT: 58 IN | BODY MASS INDEX: 38.83 KG/M2 | TEMPERATURE: 96.8 F

## 2022-02-04 VITALS — SYSTOLIC BLOOD PRESSURE: 180 MMHG | DIASTOLIC BLOOD PRESSURE: 80 MMHG

## 2022-02-04 DIAGNOSIS — Z01.810 ENCOUNTER FOR PREPROCEDURAL CARDIOVASCULAR EXAMINATION: ICD-10-CM

## 2022-02-04 PROCEDURE — 93000 ELECTROCARDIOGRAM COMPLETE: CPT | Mod: NC

## 2022-02-04 PROCEDURE — 36415 COLL VENOUS BLD VENIPUNCTURE: CPT

## 2022-02-04 PROCEDURE — 99214 OFFICE O/P EST MOD 30 MIN: CPT

## 2022-02-04 NOTE — HISTORY OF PRESENT ILLNESS
[FreeTextEntry1] : 62 F AFib s/p ILR Graves s/p MEANS Hypothyroid on replacement therapy \par \par here for preoperative cardiac optimization prior to screening colonoscopy she has no cp no sob she has no palpitations \par \par ecg nsr

## 2022-02-04 NOTE — ASSESSMENT
[FreeTextEntry1] : RCRI score is 0 for low risk procedure\par HTN start amlodipine 5 mg daily echo and urine protein\par Afib chadsvasc score is 2 unclear she needs eliquis long term she needs her loop evaluated\par will collaborate with EPS\par fu in two weeks

## 2022-02-07 LAB
ALBUMIN SERPL ELPH-MCNC: 4.6 G/DL
ALP BLD-CCNC: 97 U/L
ALT SERPL-CCNC: 13 U/L
ANION GAP SERPL CALC-SCNC: 14 MMOL/L
AST SERPL-CCNC: 16 U/L
BASOPHILS # BLD AUTO: 0.04 K/UL
BASOPHILS NFR BLD AUTO: 0.6 %
BILIRUB SERPL-MCNC: 0.3 MG/DL
BUN SERPL-MCNC: 11 MG/DL
CALCIUM SERPL-MCNC: 9.9 MG/DL
CHLORIDE SERPL-SCNC: 103 MMOL/L
CHOLEST SERPL-MCNC: 202 MG/DL
CO2 SERPL-SCNC: 25 MMOL/L
CREAT SERPL-MCNC: 0.76 MG/DL
CREAT SPEC-SCNC: 147 MG/DL
EOSINOPHIL # BLD AUTO: 0.33 K/UL
EOSINOPHIL NFR BLD AUTO: 4.7 %
GLUCOSE SERPL-MCNC: 88 MG/DL
HCT VFR BLD CALC: 41.5 %
HDLC SERPL-MCNC: 61 MG/DL
HGB BLD-MCNC: 13 G/DL
IMM GRANULOCYTES NFR BLD AUTO: 0.1 %
LDLC SERPL CALC-MCNC: 117 MG/DL
LYMPHOCYTES # BLD AUTO: 2.1 K/UL
LYMPHOCYTES NFR BLD AUTO: 30.1 %
MAN DIFF?: NORMAL
MCHC RBC-ENTMCNC: 29.8 PG
MCHC RBC-ENTMCNC: 31.3 GM/DL
MCV RBC AUTO: 95.2 FL
MICROALBUMIN 24H UR DL<=1MG/L-MCNC: <1.2 MG/DL
MICROALBUMIN/CREAT 24H UR-RTO: NORMAL MG/G
MONOCYTES # BLD AUTO: 0.64 K/UL
MONOCYTES NFR BLD AUTO: 9.2 %
NEUTROPHILS # BLD AUTO: 3.85 K/UL
NEUTROPHILS NFR BLD AUTO: 55.3 %
NONHDLC SERPL-MCNC: 141 MG/DL
PLATELET # BLD AUTO: 351 K/UL
POTASSIUM SERPL-SCNC: 4.3 MMOL/L
PROT SERPL-MCNC: 7.3 G/DL
RBC # BLD: 4.36 M/UL
RBC # FLD: 14.3 %
SODIUM SERPL-SCNC: 141 MMOL/L
TRIGL SERPL-MCNC: 122 MG/DL
WBC # FLD AUTO: 6.97 K/UL

## 2022-02-10 ENCOUNTER — APPOINTMENT (OUTPATIENT)
Dept: HEART AND VASCULAR | Facility: CLINIC | Age: 62
End: 2022-02-10

## 2022-02-18 ENCOUNTER — NON-APPOINTMENT (OUTPATIENT)
Age: 62
End: 2022-02-18

## 2022-02-18 ENCOUNTER — APPOINTMENT (OUTPATIENT)
Dept: HEART AND VASCULAR | Facility: CLINIC | Age: 62
End: 2022-02-18
Payer: COMMERCIAL

## 2022-02-18 VITALS — SYSTOLIC BLOOD PRESSURE: 140 MMHG | DIASTOLIC BLOOD PRESSURE: 80 MMHG

## 2022-02-18 VITALS
OXYGEN SATURATION: 97 % | HEIGHT: 58 IN | DIASTOLIC BLOOD PRESSURE: 68 MMHG | BODY MASS INDEX: 38.62 KG/M2 | TEMPERATURE: 96.3 F | WEIGHT: 184 LBS | SYSTOLIC BLOOD PRESSURE: 124 MMHG | HEART RATE: 66 BPM

## 2022-02-18 PROCEDURE — 93000 ELECTROCARDIOGRAM COMPLETE: CPT

## 2022-02-18 NOTE — HISTORY OF PRESENT ILLNESS
[FreeTextEntry1] : 62 F AFib s/p ILR Graves s/p MEANS Hypothyroid on replacement therapy \par \par here for BP management she was started on amlodipine and feels well\par \par ecg nsr

## 2022-02-18 NOTE — ASSESSMENT
[FreeTextEntry1] : HTN labile still needs echo stay on current amlodipine for now \par Afib chadsvasc score is 2 unclear she needs eliquis long term she needs her loop evaluated\par will collaborate with EPS\par fu in 3 months

## 2022-03-22 ENCOUNTER — NON-APPOINTMENT (OUTPATIENT)
Age: 62
End: 2022-03-22

## 2022-03-22 ENCOUNTER — APPOINTMENT (OUTPATIENT)
Dept: ENDOCRINOLOGY | Facility: CLINIC | Age: 62
End: 2022-03-22
Payer: COMMERCIAL

## 2022-03-22 VITALS
HEART RATE: 78 BPM | DIASTOLIC BLOOD PRESSURE: 77 MMHG | WEIGHT: 182 LBS | SYSTOLIC BLOOD PRESSURE: 110 MMHG | BODY MASS INDEX: 38.04 KG/M2

## 2022-03-22 DIAGNOSIS — I10 ESSENTIAL (PRIMARY) HYPERTENSION: ICD-10-CM

## 2022-03-22 PROCEDURE — 99214 OFFICE O/P EST MOD 30 MIN: CPT

## 2022-03-22 NOTE — END OF VISIT
[FreeTextEntry3] : All medical record entries made by the Scribe were at my, Dr. Kb Dodge, direction and personally dictated by me on 03/22/2022. I have reviewed the chart and agree that the record accurately reflects my personal performance of the history, physical exam, assessment and plan. I have also personally directed, reviewed and agreed with the chart.  [Time Spent: ___ minutes] : I have spent [unfilled] minutes of time on the encounter.

## 2022-03-22 NOTE — REVIEW OF SYSTEMS
[As Noted in HPI] : as noted in HPI [Heat Intolerance] : heat intolerance [Negative] : Heme/Lymph [Palpitations] : no palpitations [Constipation] : no constipation [Cold Intolerance] : no cold intolerance

## 2022-03-22 NOTE — HISTORY OF PRESENT ILLNESS
[FreeTextEntry1] : 60 y/o F pt, with Hx of Hypothyroidism developed subsequently s/p MEANS ablation on 3/25/19 for Grave's disease. \par Other PMHx: Obesity\par \par 07/09/2020 \par I spoke with patient's PCP ~3 weeks ago because patient's recent TSH was low (0.04) and Free T4 was high normal (1.6). \par \par Pt presents today for thyroid f/u, feeling well with no acute complaints. She has not been taking Levothyroxine for past 2-3 weeks. She has gained 4 lbs in past 4 months. Denies palpitations, nervousness,GI disturbances\par \par 9/22/20\par In 6/11/20 her Free T4 was found to be high normal 1.6 with suppressed TSH. Therefore her LT4 was held for a couple of days and then restarted at 88mcg.\par \par Today pt presents for thyroid f/u, feeling well with no major physical complaints. \par Pt gained 5lbs since 7/2020.\par Denies palpation and weight loss. \par \par 12/1/20\par Today pt presents for thyroid f/u, feeling well with no major physical complaints. \par Pt states she got tested for COVID- 19 and it was negative. \par She states she is staying at home and is eating. \par Denies palpitations.\par \par 3/22/21\par Today pt presents for thyroid f/u, feeling well with no major physical complaints. She notes she ran out of LT4 2 months ago.\par Pt is wondering if its okay to take weight loss tea. \par Denies palpitations, constipation, and polyuria.\par \par 3/22/2022\par Today pt presents for thyroid f/u, feeling well with no major complaints but notes she feels "hot all the time." She did not bring her vmedications with her to today's visit. She states she has been taking Levothyroxine in the morning with her other two medications, Eliquis and Amlodipine.\par Pt has HTN and is seeing a new cardiologist who prescribed blood pressure medication and plans to run multiple tests on pt.  \par Pt is unsure of weight changes. Denies palpitations, cold intolerance, constipation. \par   \par Current Medications: Eliquis 5mg BID, Amlodipine.5 mg QD, Levothyroxine 88mcg

## 2022-03-22 NOTE — PHYSICAL EXAM
[Alert] : alert [Normal Sclera/Conjunctiva] : normal sclera/conjunctiva [Normal Outer Ear/Nose] : the ears and nose were normal in appearance [No Respiratory Distress] : no respiratory distress [Normal Rate] : heart rate was normal [Regular Rhythm] : with a regular rhythm [No Edema] : no peripheral edema [Normal Bowel Sounds] : normal bowel sounds [Spine Straight] : spine straight [No Stigmata of Cushings Syndrome] : no stigmata of Cushings Syndrome [Normal Gait] : normal gait [No Rash] : no rash [Normal Reflexes] : deep tendon reflexes were 2+ and symmetric [Oriented x3] : oriented to person, place, and time [de-identified] : thyroid glands not palpated, thyroid bed normal with no mass and no nodules

## 2022-03-22 NOTE — ASSESSMENT
[Importance of Diet and Exercise] : importance of diet and exercise to improve glycemic control, achieve weight loss and improve cardiovascular health [Weight Loss] : weight loss [Levothyroxine] : The patient was instructed to take Levothyroxine on an empty stomach, separate from vitamins, and wait at least 30 minutes before eating [FreeTextEntry1] : 63 y/o F pt with:\par \par 1. Hypothyroidism following MEANS ablation for Graves' disease on 3/25/19: \par She continues taking Levothyroxine with her other medications.\par Explained to pt again today the importance of taking medication properly. Pt is to make proper corrections as needed.\par Send TFTs today.  \par \par 2. Obesity, BMI 38.04\par Explained the benefit of weight loss and the risk of complications from obesity including metabolic disorders, fatty liver disease, and OA.  \par Will send metabolic profile, lipid profile, and A1c. \par Refer pt to see RD. \par \par 3. Hx of high blood pressure\par Pt is on Amlodipine. \par She is seeing a cardiologist for evaluation. \par \par Return in 1 yr.

## 2022-03-22 NOTE — DATA REVIEWED
[FreeTextEntry1] : Most recent lab studies:\par - 2/4/22: s.creat 0.76, Ca 9.9, LDL-c 117, cholesterol 202, urine no protein \par - 3/22/21: A1c 5.8%, s.creat 0.91, Ca 9.5, TSH 66.80, Free T 0.2, Vit D 25-OH 20.2, \par - 12/22/20 A1c 5.6%, TSH 2.71, Free T4 1.3, s. creat 0.74, Ca 9.7, Vit D 25- OH 16, \par - 10/1/20 A1c 5.6%, TSH 13.83, Free T4 1.0, Ca 10.5, s. creat 0.99, \par - 8/21/20 A1c 5.3%, TSH 0.32,  s. creat 0.88, LDL-c 81, , Vit D 25- OH 26.1, \par - 6/11/20: TSH 0.04, Free T4 1.6\par - 12/3/19: TSH 0.37, Free T4 1.4, Free T3 3.22

## 2022-03-22 NOTE — ADDENDUM
[FreeTextEntry1] : I, Marco Antonio Green, acted solely as a scribe for Dr. Kb Dodge on this date. 03/22/2022.

## 2022-03-31 ENCOUNTER — APPOINTMENT (OUTPATIENT)
Dept: HEART AND VASCULAR | Facility: CLINIC | Age: 62
End: 2022-03-31

## 2022-04-12 ENCOUNTER — RX RENEWAL (OUTPATIENT)
Age: 62
End: 2022-04-12

## 2022-04-12 LAB
25(OH)D3 SERPL-MCNC: 14.6 NG/ML
ALBUMIN SERPL ELPH-MCNC: 4.5 G/DL
ALP BLD-CCNC: 88 U/L
ALT SERPL-CCNC: 11 U/L
ANION GAP SERPL CALC-SCNC: 12 MMOL/L
AST SERPL-CCNC: 13 U/L
BILIRUB SERPL-MCNC: 0.2 MG/DL
BUN SERPL-MCNC: 9 MG/DL
CALCIUM SERPL-MCNC: 9.6 MG/DL
CHLORIDE SERPL-SCNC: 104 MMOL/L
CO2 SERPL-SCNC: 25 MMOL/L
CREAT SERPL-MCNC: 0.89 MG/DL
EGFR: 74 ML/MIN/1.73M2
ESTIMATED AVERAGE GLUCOSE: 120 MG/DL
GLUCOSE SERPL-MCNC: 90 MG/DL
HBA1C MFR BLD HPLC: 5.8 %
POTASSIUM SERPL-SCNC: 4.4 MMOL/L
PROT SERPL-MCNC: 7.2 G/DL
SODIUM SERPL-SCNC: 141 MMOL/L
T4 FREE SERPL-MCNC: 1.6 NG/DL
TSH SERPL-ACNC: 1.43 UIU/ML

## 2022-05-23 ENCOUNTER — APPOINTMENT (OUTPATIENT)
Dept: HEART AND VASCULAR | Facility: CLINIC | Age: 62
End: 2022-05-23
Payer: COMMERCIAL

## 2022-05-23 VITALS
SYSTOLIC BLOOD PRESSURE: 132 MMHG | WEIGHT: 180 LBS | BODY MASS INDEX: 37.79 KG/M2 | HEART RATE: 80 BPM | HEIGHT: 58 IN | DIASTOLIC BLOOD PRESSURE: 89 MMHG | TEMPERATURE: 97.7 F

## 2022-05-23 PROCEDURE — 99214 OFFICE O/P EST MOD 30 MIN: CPT | Mod: 25

## 2022-05-23 PROCEDURE — 93285 PRGRMG DEV EVAL SCRMS IP: CPT

## 2022-05-23 NOTE — DISCUSSION/SUMMARY
[FreeTextEntry1] : 62 year old woman with PMHx including persistent atrial fibrillation and Grave's disease s/p MEANS March 2019.  She had spontaneously converted to SR several years ago when she was euthyroid.  She is now back in persistent atrial fibrillation since 3/2022.  We discussed in detail the normal conduction system of the heart and what atrial fibrillation is.  We discussed the natural progression of this arrhythmia in the context of any existing comorbidities.  We also discussed the association between atrial fibrillation and thrombotic events / stroke.  I have recommended that we proceed with ZAID/DCCV for restoration of sinus rhythm and evaluate for symptomatic improvement in sinus rhythm.  Advised to continue Eliquis without interruption for TE/CVA prophylaxis.  Pre and post procedure instructions were discussed with her in detail and all questions were answered to her apparent satisfaction.

## 2022-05-23 NOTE — PROCEDURE
[de-identified] : MDT REVEAL LINQ\par battery good\par adequate sensing\par Persistent AFib with CVR since 3/2022

## 2022-05-23 NOTE — HISTORY OF PRESENT ILLNESS
[FreeTextEntry1] : Ms. Brandt is a 62 year old woman with PMHx including atrial fibrillation and Grave's disease s/p MEANS (March 2019). She had been in persistent afib over the 1.5 years in the setting of hyperthyroidism. However, she received a radioactive iodine ablation in March 2019 and is now euthyroid. She reports her exercise tolerance dramatically improved (now able to easily climb subway stairs again) and she feels well. She has no palpitations, dyspnea on exertion or chest discomfort.  She is s/p ILR placement 10/3/19 for long-term heart rhythm assessment.  She is referred back by Dr. Fagan for anticoagulation management.  She has been tolerating Eliquis without bleeding issues.  She admits to fatigue over the past couple of months.  She has interrupted Eliquis for dental work over the last several months.

## 2022-05-23 NOTE — ADDENDUM
[FreeTextEntry1] : I, Janie Lemus, hereby attest that the medical record entry for this patient accurately reflects signatures/notations that I made on the Date of Service in my capacity as an Attending Physician when I treated/diagnosed the above patient. I do hereby attest that this information is true, accurate and complete to the best of my knowledge.  I was present for the entire visit and supervised the entire visit and made/agree with the plan as outlined.\par

## 2022-05-24 ENCOUNTER — APPOINTMENT (OUTPATIENT)
Dept: HEART AND VASCULAR | Facility: CLINIC | Age: 62
End: 2022-05-24

## 2022-06-14 ENCOUNTER — FORM ENCOUNTER (OUTPATIENT)
Age: 62
End: 2022-06-14

## 2022-06-14 LAB — SARS-COV-2 N GENE NPH QL NAA+PROBE: NOT DETECTED

## 2022-06-15 ENCOUNTER — OUTPATIENT (OUTPATIENT)
Dept: OUTPATIENT SERVICES | Facility: HOSPITAL | Age: 62
LOS: 1 days | Discharge: HOME | End: 2022-06-15
Payer: COMMERCIAL

## 2022-06-15 LAB
ALBUMIN SERPL ELPH-MCNC: 4.4 G/DL — SIGNIFICANT CHANGE UP (ref 3.3–5)
ALP SERPL-CCNC: 101 U/L — SIGNIFICANT CHANGE UP (ref 40–120)
ALT FLD-CCNC: 10 U/L — SIGNIFICANT CHANGE UP (ref 10–45)
ANION GAP SERPL CALC-SCNC: 10 MMOL/L — SIGNIFICANT CHANGE UP (ref 5–17)
APTT BLD: 32.5 SEC — SIGNIFICANT CHANGE UP (ref 27.5–35.5)
AST SERPL-CCNC: 12 U/L — SIGNIFICANT CHANGE UP (ref 10–40)
BILIRUB SERPL-MCNC: 0.3 MG/DL — SIGNIFICANT CHANGE UP (ref 0.2–1.2)
BUN SERPL-MCNC: 8 MG/DL — SIGNIFICANT CHANGE UP (ref 7–23)
CALCIUM SERPL-MCNC: 9.4 MG/DL — SIGNIFICANT CHANGE UP (ref 8.4–10.5)
CHLORIDE SERPL-SCNC: 107 MMOL/L — SIGNIFICANT CHANGE UP (ref 96–108)
CO2 SERPL-SCNC: 26 MMOL/L — SIGNIFICANT CHANGE UP (ref 22–31)
CREAT SERPL-MCNC: 0.73 MG/DL — SIGNIFICANT CHANGE UP (ref 0.5–1.3)
EGFR: 94 ML/MIN/1.73M2 — SIGNIFICANT CHANGE UP
GLUCOSE SERPL-MCNC: 94 MG/DL — SIGNIFICANT CHANGE UP (ref 70–99)
HCT VFR BLD CALC: 41.2 % — SIGNIFICANT CHANGE UP (ref 34.5–45)
HGB BLD-MCNC: 13.6 G/DL — SIGNIFICANT CHANGE UP (ref 11.5–15.5)
INR BLD: 1.04 — SIGNIFICANT CHANGE UP (ref 0.88–1.16)
MCHC RBC-ENTMCNC: 30.2 PG — SIGNIFICANT CHANGE UP (ref 27–34)
MCHC RBC-ENTMCNC: 33 GM/DL — SIGNIFICANT CHANGE UP (ref 32–36)
MCV RBC AUTO: 91.4 FL — SIGNIFICANT CHANGE UP (ref 80–100)
NRBC # BLD: 0 /100 WBCS — SIGNIFICANT CHANGE UP (ref 0–0)
PLATELET # BLD AUTO: 298 K/UL — SIGNIFICANT CHANGE UP (ref 150–400)
POTASSIUM SERPL-MCNC: 3.9 MMOL/L — SIGNIFICANT CHANGE UP (ref 3.5–5.3)
POTASSIUM SERPL-SCNC: 3.9 MMOL/L — SIGNIFICANT CHANGE UP (ref 3.5–5.3)
PROT SERPL-MCNC: 7.5 G/DL — SIGNIFICANT CHANGE UP (ref 6–8.3)
PROTHROM AB SERPL-ACNC: 12.4 SEC — SIGNIFICANT CHANGE UP (ref 10.5–13.4)
RBC # BLD: 4.51 M/UL — SIGNIFICANT CHANGE UP (ref 3.8–5.2)
RBC # FLD: 13.6 % — SIGNIFICANT CHANGE UP (ref 10.3–14.5)
SODIUM SERPL-SCNC: 143 MMOL/L — SIGNIFICANT CHANGE UP (ref 135–145)
WBC # BLD: 6.65 K/UL — SIGNIFICANT CHANGE UP (ref 3.8–10.5)
WBC # FLD AUTO: 6.65 K/UL — SIGNIFICANT CHANGE UP (ref 3.8–10.5)

## 2022-06-15 PROCEDURE — 92960 CARDIOVERSION ELECTRIC EXT: CPT

## 2022-06-15 PROCEDURE — 93312 ECHO TRANSESOPHAGEAL: CPT

## 2022-06-15 PROCEDURE — 85730 THROMBOPLASTIN TIME PARTIAL: CPT

## 2022-06-15 PROCEDURE — 85027 COMPLETE CBC AUTOMATED: CPT

## 2022-06-15 PROCEDURE — 36415 COLL VENOUS BLD VENIPUNCTURE: CPT

## 2022-06-15 PROCEDURE — 80053 COMPREHEN METABOLIC PANEL: CPT

## 2022-06-15 PROCEDURE — 93312 ECHO TRANSESOPHAGEAL: CPT | Mod: 26

## 2022-06-15 PROCEDURE — 85610 PROTHROMBIN TIME: CPT

## 2022-06-15 PROCEDURE — 76376 3D RENDER W/INTRP POSTPROCES: CPT | Mod: 26

## 2022-06-15 RX ORDER — APIXABAN 2.5 MG/1
1 TABLET, FILM COATED ORAL
Qty: 60 | Refills: 0
Start: 2022-06-15 | End: 2022-07-14

## 2022-06-15 RX ORDER — APIXABAN 2.5 MG/1
1 TABLET, FILM COATED ORAL
Qty: 60 | Refills: 2
Start: 2022-06-15 | End: 2022-09-12

## 2022-06-15 RX ORDER — APIXABAN 2.5 MG/1
5 TABLET, FILM COATED ORAL ONCE
Refills: 0 | Status: COMPLETED | OUTPATIENT
Start: 2022-06-15 | End: 2022-06-15

## 2022-06-15 RX ADMIN — APIXABAN 5 MILLIGRAM(S): 2.5 TABLET, FILM COATED ORAL at 11:48

## 2022-06-15 NOTE — PROGRESS NOTE ADULT - SUBJECTIVE AND OBJECTIVE BOX
EPS Progress Note    S:  61 yo F with PMHx including atrial fibrillation and Grave's disease s/p MEANS (March 2019). She had been in persistent afib over the 1.5 years in the setting of hyperthyroidism. However, she received a radioactive iodine ablation in March 2019 and is now euthyroid. She reports her exercise tolerance dramatically improved (now able to easily climb subway stairs again) and she feels well. She has no palpitations, dyspnea on exertion or chest discomfort.  She is s/p ILR placement 10/3/19 for long-term heart rhythm assessment.  She is referred back by Dr. Fagan for anticoagulation management.  She has been tolerating Eliquis without bleeding issues.  She admits to fatigue over the past couple of months.  She has interrupted Eliquis for dental work over the last several months. Today patient presents for ZAID?DCCV she reports interruption with Eliquis she states that she lost her medication a few day ago.      MEDICATIONS  (STANDING):             General:  NAD        HEENT:  PERRL, EOMI	  Neck: Supple, - JVD  Cardiovascular:  S1 S2, No JVD  Respiratory: CTA B/L       Gastrointestinal:  Soft, Non-tender, + BS	  Skin: No rashes, No ecchymoses, No cyanosis  Extremities: No edema  Psychiatry: A & O x 3         Labs:                                                               13.6   6.65  )-----------( 298      ( 15 Brayden 2022 11:19 )             41.2     06-15    143  |  107  |  8   ----------------------------<  94  3.9   |  26  |  0.73    Ca    9.4      15 Brayden 2022 11:19    TPro  7.5  /  Alb  4.4  /  TBili  0.3  /  DBili  x   /  AST  12  /  ALT  10  /  AlkPhos  101  06-15    PT/INR - ( 15 Brayden 2022 11:19 )   PT: 12.4 sec;   INR: 1.04          PTT - ( 15 Brayden 2022 11:19 )  PTT:32.5 sec    Assessment/Plan:  61 yo F with PMHx including atrial fibrillation and Grave's disease s/p MEANS (March 2019). She had been in persistent afib over the 1.5 years in the setting of hyperthyroidism. However, she received a radioactive iodine ablation in March 2019 and is now euthyroid.She is s/p ILR placement 10/3/19 for long-term heart rhythm assessment.    Today patient presents for ZAID?DCCV she reports interruption with Eliquis she states that she lost her medication a few day ago.   Will send new Eliquis  prescription to [facility],   Will discharge home today

## 2022-08-22 ENCOUNTER — APPOINTMENT (OUTPATIENT)
Dept: HEART AND VASCULAR | Facility: CLINIC | Age: 62
End: 2022-08-22

## 2022-08-22 VITALS
BODY MASS INDEX: 37.79 KG/M2 | DIASTOLIC BLOOD PRESSURE: 75 MMHG | WEIGHT: 180 LBS | HEIGHT: 58 IN | SYSTOLIC BLOOD PRESSURE: 109 MMHG | TEMPERATURE: 97.2 F | HEART RATE: 96 BPM

## 2022-08-22 PROCEDURE — 99214 OFFICE O/P EST MOD 30 MIN: CPT | Mod: 25

## 2022-08-22 PROCEDURE — 93000 ELECTROCARDIOGRAM COMPLETE: CPT

## 2022-08-23 NOTE — DISCUSSION/SUMMARY
[Persistent Atrial Fibrillation] : persistent atrial fibrillation [FreeTextEntry1] : 62 year old woman with PMHx including persistent atrial fibrillation with ILR s/p DCCV (6/2022) and Grave's disease s/p MEANS March 2019. She is now back in persistent AF for the past month with rapid rates. She says she feels well other than some fatigue. We will start low dose metoprolol tartrate 25mg bid and schedule her for DCCV with CT to be done on the same day in preparation for cryoballoon ablation. We discussed the risks and benefits of ablation vs. anti-arrhythmic medication and she agrees to proceed forward with DCCV and ablation. We will schedule her for ablation at the time of DCCV.

## 2022-08-23 NOTE — HISTORY OF PRESENT ILLNESS
[FreeTextEntry1] : Ms. Brandt is a 62 year old woman with PMHx including atrial fibrillation with ILR s/p DCCV (6/2022) and Grave's disease s/p MEANS (March 2019). She had been in persistent afib over the 1.5 years in the setting of hyperthyroidism. However, she received a radioactive iodine ablation in March 2019 and is now euthyroid. She reports her exercise tolerance dramatically improved (now able to easily climb subway stairs again) and she feels well. She had DCCV 2 months ago and remained in NSR for about 1 month however has been back in persistent AF for the past 1 month with HR as high as 130s-140s. She feels well other than some fatigue recently and decreased exercise tolerance. She has been compliant with her eliquis.

## 2022-08-23 NOTE — PROCEDURE
[de-identified] : MDT REVEAL LINQ\par battery good\par adequate sensing\par Persistent AFib with CVR since 3/2022

## 2022-08-23 NOTE — PHYSICAL EXAM
[General Appearance - Well Developed] : well developed [Normal Appearance] : normal appearance [General Appearance - Well Nourished] : well nourished [Heart Sounds] : normal S1 and S2 [Murmurs] : no murmurs present [Arterial Pulses Normal] : the arterial pulses were normal [] : no respiratory distress [Respiration, Rhythm And Depth] : normal respiratory rhythm and effort [Abdomen Soft] : soft [Abdomen Tenderness] : non-tender [Nail Clubbing] : no clubbing of the fingernails [Cyanosis, Localized] : no localized cyanosis [Normal Conjunctiva] : the conjunctiva exhibited no abnormalities [Normal Oral Mucosa] : normal oral mucosa [Normal Jugular Venous V Waves Present] : normal jugular venous V waves present [Abnormal Walk] : normal gait [Oriented To Time, Place, And Person] : oriented to person, place, and time [Impaired Insight] : insight and judgment were intact [Affect] : the affect was normal [FreeTextEntry1] : irregularly irregular, tachycardic

## 2022-09-28 ENCOUNTER — APPOINTMENT (OUTPATIENT)
Dept: CT IMAGING | Facility: HOSPITAL | Age: 62
End: 2022-09-28

## 2022-09-28 ENCOUNTER — OUTPATIENT (OUTPATIENT)
Dept: OUTPATIENT SERVICES | Facility: HOSPITAL | Age: 62
LOS: 1 days | Discharge: HOME | End: 2022-09-28
Payer: COMMERCIAL

## 2022-09-28 ENCOUNTER — RX RENEWAL (OUTPATIENT)
Age: 62
End: 2022-09-28

## 2022-09-28 ENCOUNTER — RESULT REVIEW (OUTPATIENT)
Age: 62
End: 2022-09-28

## 2022-09-28 LAB
ISTAT INR: 1.2 — HIGH (ref 0.88–1.16)
ISTAT PT: 13.7 SEC — HIGH (ref 10–12.9)
ISTAT VENOUS BE: 2 MMOL/L — SIGNIFICANT CHANGE UP (ref -2–3)
ISTAT VENOUS GLUCOSE: 104 MG/DL — HIGH (ref 70–99)
ISTAT VENOUS HCO3: 29 MMOL/L — HIGH (ref 23–28)
ISTAT VENOUS HEMATOCRIT: 44 % — SIGNIFICANT CHANGE UP (ref 34.5–45)
ISTAT VENOUS HEMOGLOBIN: 15 GM/DL — SIGNIFICANT CHANGE UP (ref 11.5–15.5)
ISTAT VENOUS IONIZED CALCIUM: 1.26 MMOL/L — SIGNIFICANT CHANGE UP (ref 1.12–1.3)
ISTAT VENOUS PCO2: 52 MMHG — HIGH (ref 41–51)
ISTAT VENOUS PH: 7.35 — SIGNIFICANT CHANGE UP (ref 7.31–7.41)
ISTAT VENOUS PO2: <66 MMHG — LOW (ref 35–40)
ISTAT VENOUS POTASSIUM: 4 MMOL/L — SIGNIFICANT CHANGE UP (ref 3.5–5.3)
ISTAT VENOUS SO2: 35 % — SIGNIFICANT CHANGE UP
ISTAT VENOUS SODIUM: 142 MMOL/L — SIGNIFICANT CHANGE UP (ref 135–145)
ISTAT VENOUS TCO2: 31 MMOL/L — SIGNIFICANT CHANGE UP (ref 22–31)
POCT ISTAT CREATININE: 0.8 MG/DL — SIGNIFICANT CHANGE UP (ref 0.5–1.3)

## 2022-09-28 PROCEDURE — 84132 ASSAY OF SERUM POTASSIUM: CPT

## 2022-09-28 PROCEDURE — 82330 ASSAY OF CALCIUM: CPT

## 2022-09-28 PROCEDURE — 85610 PROTHROMBIN TIME: CPT

## 2022-09-28 PROCEDURE — 82947 ASSAY GLUCOSE BLOOD QUANT: CPT

## 2022-09-28 PROCEDURE — 82803 BLOOD GASES ANY COMBINATION: CPT

## 2022-09-28 PROCEDURE — 84295 ASSAY OF SERUM SODIUM: CPT

## 2022-09-28 PROCEDURE — 93005 ELECTROCARDIOGRAM TRACING: CPT

## 2022-09-28 PROCEDURE — 85014 HEMATOCRIT: CPT

## 2022-09-28 PROCEDURE — 92960 CARDIOVERSION ELECTRIC EXT: CPT

## 2022-09-28 PROCEDURE — 75572 CT HRT W/3D IMAGE: CPT | Mod: 26

## 2022-09-28 PROCEDURE — 75572 CT HRT W/3D IMAGE: CPT

## 2022-09-28 PROCEDURE — 82565 ASSAY OF CREATININE: CPT

## 2022-09-29 ENCOUNTER — NON-APPOINTMENT (OUTPATIENT)
Age: 62
End: 2022-09-29

## 2022-10-24 ENCOUNTER — APPOINTMENT (OUTPATIENT)
Dept: HEART AND VASCULAR | Facility: CLINIC | Age: 62
End: 2022-10-24

## 2022-10-24 VITALS
WEIGHT: 180 LBS | HEIGHT: 58 IN | HEART RATE: 41 BPM | SYSTOLIC BLOOD PRESSURE: 141 MMHG | BODY MASS INDEX: 37.79 KG/M2 | DIASTOLIC BLOOD PRESSURE: 70 MMHG

## 2022-10-24 PROCEDURE — 93285 PRGRMG DEV EVAL SCRMS IP: CPT

## 2022-10-24 PROCEDURE — 99214 OFFICE O/P EST MOD 30 MIN: CPT | Mod: 25

## 2022-10-24 NOTE — DISCUSSION/SUMMARY
[Persistent Atrial Fibrillation] : persistent atrial fibrillation [FreeTextEntry1] : 62 year old woman with PMHx including persistent atrial fibrillation with ILR s/p DCCV (6/2022) and Grave's disease s/p MEANS March 2019. She has had two cardioversions within the last several months. \par We discussed in detail the normal conduction system of the heart and what atrial fibrillation is.  We discussed the natural progression of this arrhythmia in the context of any existing comorbidities.  We also discussed the association between atrial fibrillation and thrombotic events / stroke.  Her CHADS score is 1 for female and she currently is not on oral anticoagulation.   \par \par We discussed treatment options for paroxysmal atrial fibrillation including rate control vs. rhythm control with antiarrhythmic medications or an ablation.  The patent was counseled on the fact that there is no cure for atrial fibrillation and that for long term control of atrial fibrillation a combination of strategies if often used.  Regardless of treatment options and maintenance of sinus rhythm, anticoagulation is still recommended based on CHADSVASC score.   \par \par Based on recent studies, atrial fibrillation is recommended as an option for first line therapy in patients with reduced LV function and those who cannot tolerate medical therapy.   \par \par Success in rhythm control management is best defined as improved quality of life, maintenance of sinus rhythm and reduced hospitalization since not all patients have continuous monitoring and .or symptoms to diagnose “sub-clinical episodes”.  Modern day ablation likely results in better long term outcomes compared to medical therapy alone, but repeat procedures and/or addition of medications may be required.  Results of ablation are better for paroxysmal patients compared to persistent patients, which are better than long-standing persistent patients.  Typical 3-5 year success rates (freedom from clinical atrial fibrillation) based on available literature over multiple procedures were quoted to the patient at approximately 80-90% for paroxysmal, 60-80% for persistent and 40-60% for long standing persistent.   \par \par  After discussing this the patient would like to proceed with an ablation.  We discussed the benefits and drawbacks of each option in detail.   We discussed the procedure in detail including risks, benefits, and alternatives.  I have quoted a 1:700 chance of major complication related to the procedure.  Risks including, but not limited to; infection, anesthesia reaction, bleeding, pain, vascular injury, cardiac perforation, esophageal injury/fistula,  TE/CVA, phrenic nerve injury, arrhythmia recurrence and death were discussed.  We also discussed that having recurrent arrhythmia for the first 90 days post ablation is not predictive of long term success of the ablation.  All questions answered and the patient was given pre procedure instructions.  Advised to call with any questions or concerns.\par

## 2022-10-24 NOTE — HISTORY OF PRESENT ILLNESS
[FreeTextEntry1] : Ms. Brandt is a 62 year old woman with PMHx including atrial fibrillation with ILR s/p DCCV (6/2022) and Grave's disease s/p MEANS (March 2019). She had been in persistent afib over the 1.5 years in the setting of hyperthyroidism. However, she received a radioactive iodine ablation in March 2019 and is now euthyroid. She reports her exercise tolerance dramatically improved (now able to easily climb subway stairs again) and she feels well. She had DCCV 6/15/22.  She had recurrent AFib with HR up to the 130-140s, now s/p repeat DCCV 9/28/22. She feels well and notes improvement in energy and breathing.

## 2022-10-29 ENCOUNTER — LABORATORY RESULT (OUTPATIENT)
Age: 62
End: 2022-10-29

## 2022-11-01 ENCOUNTER — INPATIENT (INPATIENT)
Facility: HOSPITAL | Age: 62
LOS: 0 days | Discharge: HOME | DRG: 274 | End: 2022-11-02
Attending: INTERNAL MEDICINE | Admitting: INTERNAL MEDICINE
Payer: COMMERCIAL

## 2022-11-01 VITALS — WEIGHT: 178.57 LBS | HEIGHT: 59 IN

## 2022-11-01 DIAGNOSIS — I48.0 PAROXYSMAL ATRIAL FIBRILLATION: ICD-10-CM

## 2022-11-01 LAB
ANION GAP SERPL CALC-SCNC: 8 MMOL/L — SIGNIFICANT CHANGE UP (ref 5–17)
APTT BLD: 35.9 SEC — HIGH (ref 27.5–35.5)
BUN SERPL-MCNC: 10 MG/DL — SIGNIFICANT CHANGE UP (ref 7–23)
CALCIUM SERPL-MCNC: 9.6 MG/DL — SIGNIFICANT CHANGE UP (ref 8.4–10.5)
CHLORIDE SERPL-SCNC: 103 MMOL/L — SIGNIFICANT CHANGE UP (ref 96–108)
CO2 SERPL-SCNC: 31 MMOL/L — SIGNIFICANT CHANGE UP (ref 22–31)
CREAT SERPL-MCNC: 0.75 MG/DL — SIGNIFICANT CHANGE UP (ref 0.5–1.3)
EGFR: 91 ML/MIN/1.73M2 — SIGNIFICANT CHANGE UP
GLUCOSE SERPL-MCNC: 92 MG/DL — SIGNIFICANT CHANGE UP (ref 70–99)
HCT VFR BLD CALC: 39.9 % — SIGNIFICANT CHANGE UP (ref 34.5–45)
HGB BLD-MCNC: 13.3 G/DL — SIGNIFICANT CHANGE UP (ref 11.5–15.5)
INR BLD: 1.28 — HIGH (ref 0.88–1.16)
ISTAT INR: 1.2 — HIGH (ref 0.88–1.16)
ISTAT PT: 14.1 SEC — HIGH (ref 10–12.9)
ISTAT VENOUS BE: 5 MMOL/L — HIGH (ref -2–3)
ISTAT VENOUS GLUCOSE: 93 MG/DL — SIGNIFICANT CHANGE UP (ref 70–99)
ISTAT VENOUS HCO3: 32 MMOL/L — HIGH (ref 23–28)
ISTAT VENOUS HEMATOCRIT: 42 % — SIGNIFICANT CHANGE UP (ref 34.5–45)
ISTAT VENOUS HEMOGLOBIN: 14.3 GM/DL — SIGNIFICANT CHANGE UP (ref 11.5–15.5)
ISTAT VENOUS IONIZED CALCIUM: 1.18 MMOL/L — SIGNIFICANT CHANGE UP (ref 1.12–1.3)
ISTAT VENOUS PCO2: 57 MMHG — HIGH (ref 41–51)
ISTAT VENOUS PH: 7.36 — SIGNIFICANT CHANGE UP (ref 7.31–7.41)
ISTAT VENOUS PO2: <66 MMHG — LOW (ref 35–40)
ISTAT VENOUS POTASSIUM: 3.7 MMOL/L — SIGNIFICANT CHANGE UP (ref 3.5–5.3)
ISTAT VENOUS SO2: 28 % — SIGNIFICANT CHANGE UP
ISTAT VENOUS SODIUM: 142 MMOL/L — SIGNIFICANT CHANGE UP (ref 135–145)
ISTAT VENOUS TCO2: 34 MMOL/L — HIGH (ref 22–31)
MCHC RBC-ENTMCNC: 30.2 PG — SIGNIFICANT CHANGE UP (ref 27–34)
MCHC RBC-ENTMCNC: 33.3 GM/DL — SIGNIFICANT CHANGE UP (ref 32–36)
MCV RBC AUTO: 90.5 FL — SIGNIFICANT CHANGE UP (ref 80–100)
NRBC # BLD: 0 /100 WBCS — SIGNIFICANT CHANGE UP (ref 0–0)
PLATELET # BLD AUTO: 337 K/UL — SIGNIFICANT CHANGE UP (ref 150–400)
POCT ISTAT CREATININE: 0.7 MG/DL — SIGNIFICANT CHANGE UP (ref 0.5–1.3)
POTASSIUM SERPL-MCNC: 3.7 MMOL/L — SIGNIFICANT CHANGE UP (ref 3.5–5.3)
POTASSIUM SERPL-SCNC: 3.7 MMOL/L — SIGNIFICANT CHANGE UP (ref 3.5–5.3)
PROTHROM AB SERPL-ACNC: 15.3 SEC — HIGH (ref 10.5–13.4)
RBC # BLD: 4.41 M/UL — SIGNIFICANT CHANGE UP (ref 3.8–5.2)
RBC # FLD: 13.2 % — SIGNIFICANT CHANGE UP (ref 10.3–14.5)
SODIUM SERPL-SCNC: 142 MMOL/L — SIGNIFICANT CHANGE UP (ref 135–145)
TSH SERPL-MCNC: 3.11 UIU/ML — SIGNIFICANT CHANGE UP (ref 0.27–4.2)
WBC # BLD: 8.26 K/UL — SIGNIFICANT CHANGE UP (ref 3.8–10.5)
WBC # FLD AUTO: 8.26 K/UL — SIGNIFICANT CHANGE UP (ref 3.8–10.5)

## 2022-11-01 PROCEDURE — 93656 COMPRE EP EVAL ABLTJ ATR FIB: CPT

## 2022-11-01 RX ORDER — LEVOTHYROXINE SODIUM 125 MCG
88 TABLET ORAL DAILY
Refills: 0 | Status: DISCONTINUED | OUTPATIENT
Start: 2022-11-01 | End: 2022-11-02

## 2022-11-01 RX ORDER — AMLODIPINE BESYLATE 2.5 MG/1
5 TABLET ORAL DAILY
Refills: 0 | Status: DISCONTINUED | OUTPATIENT
Start: 2022-11-01 | End: 2022-11-02

## 2022-11-01 RX ORDER — BENZOCAINE AND MENTHOL 5; 1 G/100ML; G/100ML
1 LIQUID ORAL EVERY 4 HOURS
Refills: 0 | Status: DISCONTINUED | OUTPATIENT
Start: 2022-11-01 | End: 2022-11-02

## 2022-11-01 RX ORDER — APIXABAN 2.5 MG/1
5 TABLET, FILM COATED ORAL EVERY 12 HOURS
Refills: 0 | Status: DISCONTINUED | OUTPATIENT
Start: 2022-11-01 | End: 2022-11-02

## 2022-11-01 RX ORDER — BENZOCAINE AND MENTHOL 5; 1 G/100ML; G/100ML
1 LIQUID ORAL ONCE
Refills: 0 | Status: COMPLETED | OUTPATIENT
Start: 2022-11-01 | End: 2022-11-02

## 2022-11-01 RX ORDER — ACETAMINOPHEN 500 MG
650 TABLET ORAL ONCE
Refills: 0 | Status: COMPLETED | OUTPATIENT
Start: 2022-11-01 | End: 2022-11-01

## 2022-11-01 RX ADMIN — Medication 650 MILLIGRAM(S): at 22:15

## 2022-11-01 RX ADMIN — Medication 650 MILLIGRAM(S): at 20:41

## 2022-11-01 RX ADMIN — APIXABAN 5 MILLIGRAM(S): 2.5 TABLET, FILM COATED ORAL at 21:01

## 2022-11-01 NOTE — PRE-ANESTHESIA EVALUATION ADULT - NSRADCARDRESULTSFT_GEN_ALL_CORE
CONCLUSIONS:    Echo 6/2022:   1. Normal left ventricular size and systolic function.   2. Normal right ventricular size and systolic function.   3. No LA/RA/JO/RAA thrombus seen.   4. No evidence of an intracardiac shunt.   5. Mild mitral regurgitation.   6. No pericardial effusion.   7. No prior echo is available for comparison.

## 2022-11-01 NOTE — H&P ADULT - PROBLEM SELECTOR PLAN 1
- NPO for PVI ablation.  - Monitor groins for bleeding and hematoma.  - Bedrest for 4H post-procedure.  - Resume Eliquis tonight.  - Hold metoprolol in the setting of bradycardia to 30s.

## 2022-11-01 NOTE — H&P ADULT - NSHPLABSRESULTS_GEN_ALL_CORE
13.3   8.26  )-----------( 337      ( 01 Nov 2022 13:05 )             39.9     11-01    142  |  103  |  10  ----------------------------<  92  3.7   |  31  |  0.75    Ca    9.6      01 Nov 2022 13:05    PT/INR - ( 01 Nov 2022 13:05 )   PT: 15.3 sec;   INR: 1.28       PTT - ( 01 Nov 2022 13:05 )  PTT:35.9 sec

## 2022-11-01 NOTE — H&P ADULT - NSHPPHYSICALEXAM_GEN_ALL_CORE
Constitutional: No acute Distress  Psych: Normal affect, normal mood  Neuro: A/o x 3, No focal deficits  Neck: Supple, NO JVD  CVS: S1 S2, No M/R/G  Pulmonary: CTAB, Breathing unlabored, No Rhonchi/Rales/Wheezing  GI: Soft, Non -tender, +BS x 4 quads  Skin: No rash, warm and dry, no erythematous areas   MSK: 5/5 strength, normal range of motion, no swollen or erythematous joints.
Anterior

## 2022-11-01 NOTE — H&P ADULT - ASSESSMENT
Ms. Brandt is a 62 year old woman with PMHx of atrial fibrillation with ILR s/p DCCV (6/2022 & 9/28/22) and Grave's disease s/p MEANS (March 2019) who presents for PVI ablation of her AF substrate.

## 2022-11-01 NOTE — PRE-ANESTHESIA EVALUATION ADULT - NSANTHPMHFT_GEN_ALL_CORE
F with atrial fibrillation, hypothyroidism (s/p MEASN for Graves), HTN (off meds due to insurance issues), and obesity.     Meds:  Metoprolol  Elliquis    Allergies: NKDA    PSH:  SY   x3 62 F with atrial fibrillation, hypothyroidism (s/p MEANS for Graves), HTN (off meds due to insurance issues), and obesity.     Meds:  Metoprolol  Elliquis    Allergies: NKDA    PSH:  SY   x3

## 2022-11-01 NOTE — PRE-ANESTHESIA EVALUATION ADULT - NSPREOPDXFT_GEN_ALL_CORE
What Type Of Note Output Would You Prefer (Optional)?: Standard Output Hpi Title: Evaluation of Skin Lesions How Severe Are Your Spot(S)?: mild Have Your Spot(S) Been Treated In The Past?: has not been treated Atrial Fibrillation

## 2022-11-01 NOTE — H&P ADULT - HISTORY OF PRESENT ILLNESS
Ms. Brandt is a 62 year old woman with PMHx of atrial fibrillation with ILR s/p DCCV (6/2022 & 9/28/22) and Grave's disease s/p MEANS (March 2019) who presents for PVI ablation of her AF substrate.     She had been in persistent afib over the 1.5 years in the setting of hyperthyroidism. Since receiving a radioactive iodine ablation in March 2019 she is now euthyroid. She reports her exercise tolerance dramatically improved (now able to easily climb subway stairs again) and she feels well. Given recent AF recurrence requiring DCCV, the decision was made to proceed with ablation.    Her rhythm today is sinus bradycardia 36 bpm. TSH WNL. Last dose of Eliquis this morning - reports compliance with no missed doses over the past month.

## 2022-11-01 NOTE — CHART NOTE - NSCHARTNOTEFT_GEN_A_CORE
JOSÉ MIGUEL DYER  [id number]    PROCEDURE:  EPS and afib ablation (cryo)  right and left femoral vein access  single transeptal puncture       INDICATION:  symptomatic atrial fibrillation         ELECTROPHYSIOLOGIST(S):  Dr. Lemus   fellow- KERI mix     ANESTHESIOLOGY:    GA     FINDINGS:    normal intracardiac conduction and electrical intervals,   Pulmonary vein isolation of all 4 pulmonary veins   no effusion on ice     COMPLICATIONS:    none     RECOMMENDATIONS:    uninterrupted anticoagulation with apixaban 5 mg bid  TSH with am labs   bed rest for 6 hours.   ekg    discharge in the am

## 2022-11-02 ENCOUNTER — TRANSCRIPTION ENCOUNTER (OUTPATIENT)
Age: 62
End: 2022-11-02

## 2022-11-02 VITALS — WEIGHT: 177.91 LBS

## 2022-11-02 PROBLEM — Z00.00 ENCOUNTER FOR PREVENTIVE HEALTH EXAMINATION: Noted: 2022-11-02

## 2022-11-02 LAB
HCV AB S/CO SERPL IA: 0.04 S/CO — SIGNIFICANT CHANGE UP
HCV AB SERPL-IMP: SIGNIFICANT CHANGE UP

## 2022-11-02 PROCEDURE — 80048 BASIC METABOLIC PNL TOTAL CA: CPT

## 2022-11-02 PROCEDURE — C1733: CPT

## 2022-11-02 PROCEDURE — C1760: CPT

## 2022-11-02 PROCEDURE — C1769: CPT

## 2022-11-02 PROCEDURE — 86900 BLOOD TYPING SEROLOGIC ABO: CPT

## 2022-11-02 PROCEDURE — C9399: CPT

## 2022-11-02 PROCEDURE — 84295 ASSAY OF SERUM SODIUM: CPT

## 2022-11-02 PROCEDURE — 36415 COLL VENOUS BLD VENIPUNCTURE: CPT

## 2022-11-02 PROCEDURE — 85610 PROTHROMBIN TIME: CPT

## 2022-11-02 PROCEDURE — 82330 ASSAY OF CALCIUM: CPT

## 2022-11-02 PROCEDURE — C1894: CPT

## 2022-11-02 PROCEDURE — 86901 BLOOD TYPING SEROLOGIC RH(D): CPT

## 2022-11-02 PROCEDURE — 82947 ASSAY GLUCOSE BLOOD QUANT: CPT

## 2022-11-02 PROCEDURE — 82565 ASSAY OF CREATININE: CPT

## 2022-11-02 PROCEDURE — 85027 COMPLETE CBC AUTOMATED: CPT

## 2022-11-02 PROCEDURE — C1759: CPT

## 2022-11-02 PROCEDURE — C1766: CPT

## 2022-11-02 PROCEDURE — 85730 THROMBOPLASTIN TIME PARTIAL: CPT

## 2022-11-02 PROCEDURE — 84443 ASSAY THYROID STIM HORMONE: CPT

## 2022-11-02 PROCEDURE — 86803 HEPATITIS C AB TEST: CPT

## 2022-11-02 PROCEDURE — 85347 COAGULATION TIME ACTIVATED: CPT

## 2022-11-02 PROCEDURE — C1730: CPT

## 2022-11-02 PROCEDURE — 86850 RBC ANTIBODY SCREEN: CPT

## 2022-11-02 PROCEDURE — 85014 HEMATOCRIT: CPT

## 2022-11-02 PROCEDURE — 82803 BLOOD GASES ANY COMBINATION: CPT

## 2022-11-02 PROCEDURE — 84132 ASSAY OF SERUM POTASSIUM: CPT

## 2022-11-02 RX ORDER — COLCHICINE 0.6 MG
1 TABLET ORAL
Qty: 7 | Refills: 0
Start: 2022-11-02 | End: 2022-11-08

## 2022-11-02 RX ORDER — LANOLIN ALCOHOL/MO/W.PET/CERES
5 CREAM (GRAM) TOPICAL AT BEDTIME
Refills: 0 | Status: DISCONTINUED | OUTPATIENT
Start: 2022-11-02 | End: 2022-11-02

## 2022-11-02 RX ORDER — INFLUENZA VIRUS VACCINE 15; 15; 15; 15 UG/.5ML; UG/.5ML; UG/.5ML; UG/.5ML
0.5 SUSPENSION INTRAMUSCULAR ONCE
Refills: 0 | Status: DISCONTINUED | OUTPATIENT
Start: 2022-11-02 | End: 2022-11-02

## 2022-11-02 RX ORDER — COLCHICINE 0.6 MG
0.6 TABLET ORAL DAILY
Refills: 0 | Status: DISCONTINUED | OUTPATIENT
Start: 2022-11-02 | End: 2022-11-02

## 2022-11-02 RX ADMIN — Medication 0.6 MILLIGRAM(S): at 11:19

## 2022-11-02 RX ADMIN — AMLODIPINE BESYLATE 5 MILLIGRAM(S): 2.5 TABLET ORAL at 08:52

## 2022-11-02 RX ADMIN — APIXABAN 5 MILLIGRAM(S): 2.5 TABLET, FILM COATED ORAL at 08:52

## 2022-11-02 RX ADMIN — BENZOCAINE AND MENTHOL 1 LOZENGE: 5; 1 LIQUID ORAL at 05:33

## 2022-11-02 RX ADMIN — Medication 88 MICROGRAM(S): at 08:52

## 2022-11-02 NOTE — DISCHARGE NOTE NURSING/CASE MANAGEMENT/SOCIAL WORK - NSDCFUADDAPPT_GEN_ALL_CORE_FT
Follow up Dr. Lemus  12/19/22 @ 9:50 am, call [phone] if you have questions.   Avoid heavy lifting, exercise and strenuous activity for 7 days.  Showering is OK.  Prescription for colchicine was sent to your pharmacy.

## 2022-11-02 NOTE — DIETITIAN INITIAL EVALUATION ADULT - PERTINENT LABORATORY DATA
11-01    142  |  103  |  10  ----------------------------<  92  3.7   |  31  |  0.75    Ca    9.6      01 Nov 2022 13:05

## 2022-11-02 NOTE — DIETITIAN INITIAL EVALUATION ADULT - OTHER CALCULATIONS
4'11'' IBW 98 pounds+-10%  Wt 178 pounds, BMI 35.9, %TBP242  UPPER-IBW used to calculate energy needs due to pt's current body weight exceeding 120% of IBW  Adjust for age and current conditions -- rec upper end EER

## 2022-11-02 NOTE — PATIENT PROFILE ADULT - FALL HARM RISK - HARM RISK INTERVENTIONS

## 2022-11-02 NOTE — DIETITIAN INITIAL EVALUATION ADULT - PERTINENT MEDS FT
MEDICATIONS  (STANDING):  amLODIPine   Tablet 5 milliGRAM(s) Oral daily  apixaban 5 milliGRAM(s) Oral every 12 hours  colchicine 0.6 milliGRAM(s) Oral daily  influenza   Vaccine 0.5 milliLiter(s) IntraMuscular once  levothyroxine 88 MICROGram(s) Oral daily  melatonin 5 milliGRAM(s) Oral at bedtime    MEDICATIONS  (PRN):  benzocaine 15 mG/menthol 3.6 mG Lozenge 1 Lozenge Oral every 4 hours PRN Sore Throat

## 2022-11-02 NOTE — DISCHARGE NOTE PROVIDER - HOSPITAL COURSE
62 year old woman with PMHx of atrial fibrillation with ILR s/p DCCV (6/2022 & 9/28/22) and Grave's disease s/p MEANS (March 2019) who presents for PVI ablation of her AF substrate.   She had been in persistent afib over the 1.5 years in the setting of hyperthyroidism. Since receiving a radioactive iodine ablation in March 2019 she is now euthyroid. She reports her exercise tolerance dramatically improved (now able to easily climb subway stairs again) and she feels well. Given recent AF recurrence requiring DCCV, the decision was made to proceed with ablation.  Patient has successful ablation of atrial fibrillation, there were no complications.       62 year old woman with PMHx of atrial fibrillation with ILR s/p DCCV (6/2022 & 9/28/22) and Grave's disease s/p MEANS (March 2019) who presents for PVI ablation of her AF substrate.   She had been in persistent afib over the 1.5 years in the setting of hyperthyroidism. Since receiving a radioactive iodine ablation in March 2019 she is now euthyroid. She reports her exercise tolerance dramatically improved (now able to easily climb subway stairs again) and she feels well. Given recent AF recurrence requiring DCCV, the decision was made to proceed with ablation.  Patient has successful ablation of atrial fibrillation, there were no complications.  Bl groin check no bleeding, no swelling, no hematoma  Patient is in sinus rhythm   Stable for discharge

## 2022-11-02 NOTE — DIETITIAN INITIAL EVALUATION ADULT - OTHER INFO
62 year old woman with PMHx of atrial fibrillation with ILR s/p DCCV (6/2022 & 9/28/22) and Grave's disease s/p MEANS (March 2019) who presents for PVI ablation of her AF substrate.     Pt seen alert in room this AM. Breakfast shade at bedside, 100% consumed. Reports good PO pta as well. Seems to be on regular diet: likes anna greens, cabbage made with broth. NKFA. No issues chewing/swallowing. Admit wt 178 pounds noted, RD obtained bed scale wt today at 187 pounds. Wt hx not stated, will cont to trend and monitor wts. José Miguel 20, No edema or pressure ulcers-SX site noted. GI WDL BM+ 11/1.   Please see below for nutritions recommendations.

## 2022-11-02 NOTE — DISCHARGE NOTE NURSING/CASE MANAGEMENT/SOCIAL WORK - PATIENT PORTAL LINK FT
You can access the FollowMyHealth Patient Portal offered by [facility] by registering at the following website: [email or web address]. By joining [facility]’s FollowMyHealth portal, you will also be able to view your health information using other applications (apps) compatible with our system.

## 2022-11-02 NOTE — DISCHARGE NOTE PROVIDER - NSDCFUADDAPPT_GEN_ALL_CORE_FT
Follow up Dr. Lemus  12/19/22 @ 9:50 am, call [phone] if you have questions.   Avoid heavy lifting, exercise and strenuous activity for 7 days.  Showering is OK.  Prescription for colchicine was sent to your pharmacy.

## 2022-11-02 NOTE — DISCHARGE NOTE PROVIDER - CARE PROVIDER_API CALL
Janie Lemus)  Cardiac Electrophysiology; Cardiovascular Disease; Internal Medicine  [address] [place] [address]  Phone: [phone]  Fax: [phone]  Follow Up Time:

## 2022-11-02 NOTE — DISCHARGE NOTE NURSING/CASE MANAGEMENT/SOCIAL WORK - NSDCPEFALRISK_GEN_ALL_CORE
For information on Fall & Injury Prevention, visit: [email or web address] OR  [email or web address] OR  https://www.cdc.gov/steadi/patient.html

## 2022-11-02 NOTE — DISCHARGE NOTE PROVIDER - NSDCMRMEDTOKEN_GEN_ALL_CORE_FT
amLODIPine 5 mg oral tablet: 1 tab(s) orally once a day  Eliquis 5 mg oral tablet: 1 tab(s) orally 2 times a day  levothyroxine 88 mcg (0.088 mg) oral tablet: 1 tab(s) orally once a day  Metoprolol Tartrate 25 mg oral tablet: 1 tab(s) orally once a day   amLODIPine 5 mg oral tablet: 1 tab(s) orally once a day  Colcrys 0.6 mg oral tablet: 1 tab(s) orally once a day  Eliquis 5 mg oral tablet: 1 tab(s) orally 2 times a day  levothyroxine 88 mcg (0.088 mg) oral tablet: 1 tab(s) orally once a day  Metoprolol Tartrate 25 mg oral tablet: 1 tab(s) orally once a day

## 2022-11-15 DIAGNOSIS — I48.19 OTHER PERSISTENT ATRIAL FIBRILLATION: ICD-10-CM

## 2022-11-15 DIAGNOSIS — Z79.01 LONG TERM (CURRENT) USE OF ANTICOAGULANTS: ICD-10-CM

## 2022-11-15 DIAGNOSIS — R00.1 BRADYCARDIA, UNSPECIFIED: ICD-10-CM

## 2022-11-15 LAB
ISTAT ACTK (ACTIVATED CLOTTING TIME KAOLIN): 324 SEC — HIGH (ref 74–137)
ISTAT ACTK (ACTIVATED CLOTTING TIME KAOLIN): 335 SEC — HIGH (ref 74–137)
ISTAT ACTK (ACTIVATED CLOTTING TIME KAOLIN): 381 SEC — HIGH (ref 74–137)

## 2022-11-29 ENCOUNTER — NON-APPOINTMENT (OUTPATIENT)
Age: 62
End: 2022-11-29

## 2022-11-29 ENCOUNTER — APPOINTMENT (OUTPATIENT)
Dept: HEART AND VASCULAR | Facility: CLINIC | Age: 62
End: 2022-11-29

## 2022-11-29 VITALS
BODY MASS INDEX: 37.57 KG/M2 | TEMPERATURE: 97.9 F | HEIGHT: 58 IN | HEART RATE: 47 BPM | OXYGEN SATURATION: 98 % | WEIGHT: 179 LBS | SYSTOLIC BLOOD PRESSURE: 130 MMHG | DIASTOLIC BLOOD PRESSURE: 80 MMHG

## 2022-11-29 PROCEDURE — 99214 OFFICE O/P EST MOD 30 MIN: CPT | Mod: 25

## 2022-11-29 PROCEDURE — 93000 ELECTROCARDIOGRAM COMPLETE: CPT

## 2022-11-29 PROCEDURE — 36415 COLL VENOUS BLD VENIPUNCTURE: CPT

## 2022-11-29 RX ORDER — COLCHICINE 0.6 MG/1
0.6 TABLET ORAL
Qty: 7 | Refills: 0 | Status: DISCONTINUED | COMMUNITY
Start: 2022-11-02 | End: 2022-11-29

## 2022-11-29 NOTE — ASSESSMENT
[FreeTextEntry1] : HTN labile still needs echo stay on current amlodipine for now \par Afib chadsvasc score is 2 unclear she needs eliquis long term she needs her loop evaluated\par will collaborate with EPS\par fu in 6 months

## 2022-11-29 NOTE — HISTORY OF PRESENT ILLNESS
[FreeTextEntry1] : 62 F AFib ablation ILR Graves s/p MEANS Hypothyroid on replacement therapy Gout\par \par her for bp management\par \par \par ecg sb 11/29/2022\par ZAID 6/15/2022 EF normal

## 2022-11-30 PROBLEM — I48.0 PAROXYSMAL ATRIAL FIBRILLATION: Chronic | Status: ACTIVE | Noted: 2022-11-01

## 2022-11-30 LAB
ALBUMIN SERPL ELPH-MCNC: 4.8 G/DL
ALP BLD-CCNC: 103 U/L
ALT SERPL-CCNC: 14 U/L
ANION GAP SERPL CALC-SCNC: 12 MMOL/L
AST SERPL-CCNC: 14 U/L
BASOPHILS # BLD AUTO: 0.05 K/UL
BASOPHILS NFR BLD AUTO: 0.6 %
BILIRUB SERPL-MCNC: 0.3 MG/DL
BUN SERPL-MCNC: 11 MG/DL
CALCIUM SERPL-MCNC: 10.4 MG/DL
CHLORIDE SERPL-SCNC: 99 MMOL/L
CHOLEST SERPL-MCNC: 230 MG/DL
CO2 SERPL-SCNC: 28 MMOL/L
CREAT SERPL-MCNC: 0.75 MG/DL
EGFR: 91 ML/MIN/1.73M2
EOSINOPHIL # BLD AUTO: 0.33 K/UL
EOSINOPHIL NFR BLD AUTO: 3.7 %
ESTIMATED AVERAGE GLUCOSE: 120 MG/DL
GLUCOSE SERPL-MCNC: 100 MG/DL
HBA1C MFR BLD HPLC: 5.8 %
HCT VFR BLD CALC: 40.6 %
HDLC SERPL-MCNC: 62 MG/DL
HGB BLD-MCNC: 13.1 G/DL
IMM GRANULOCYTES NFR BLD AUTO: 0.2 %
LDLC SERPL CALC-MCNC: 142 MG/DL
LYMPHOCYTES # BLD AUTO: 3.12 K/UL
LYMPHOCYTES NFR BLD AUTO: 34.5 %
MAN DIFF?: NORMAL
MCHC RBC-ENTMCNC: 30 PG
MCHC RBC-ENTMCNC: 32.3 GM/DL
MCV RBC AUTO: 93.1 FL
MONOCYTES # BLD AUTO: 0.88 K/UL
MONOCYTES NFR BLD AUTO: 9.7 %
NEUTROPHILS # BLD AUTO: 4.64 K/UL
NEUTROPHILS NFR BLD AUTO: 51.3 %
NONHDLC SERPL-MCNC: 168 MG/DL
PLATELET # BLD AUTO: 369 K/UL
POTASSIUM SERPL-SCNC: 3.9 MMOL/L
PROT SERPL-MCNC: 7.7 G/DL
RBC # BLD: 4.36 M/UL
RBC # FLD: 13.2 %
SODIUM SERPL-SCNC: 138 MMOL/L
T4 FREE SERPL-MCNC: 1.5 NG/DL
TRIGL SERPL-MCNC: 130 MG/DL
TSH SERPL-ACNC: 2.22 UIU/ML
WBC # FLD AUTO: 9.04 K/UL

## 2022-12-19 ENCOUNTER — APPOINTMENT (OUTPATIENT)
Dept: HEART AND VASCULAR | Facility: CLINIC | Age: 62
End: 2022-12-19

## 2022-12-19 VITALS
HEART RATE: 42 BPM | SYSTOLIC BLOOD PRESSURE: 100 MMHG | HEIGHT: 58 IN | BODY MASS INDEX: 37.57 KG/M2 | WEIGHT: 179 LBS | DIASTOLIC BLOOD PRESSURE: 49 MMHG

## 2022-12-19 PROCEDURE — 93285 PRGRMG DEV EVAL SCRMS IP: CPT

## 2022-12-19 PROCEDURE — 99213 OFFICE O/P EST LOW 20 MIN: CPT

## 2022-12-19 NOTE — PROCEDURE
[de-identified] : MDT REVEAL LINQ\par battery good\par Sensing 0.32 mV\par 1 AF event 12/5/22 49 hours with med rate 78 bpm

## 2022-12-19 NOTE — DISCUSSION/SUMMARY
[FreeTextEntry1] : 62 year old woman with PMHx including Grave's disease s/p MEANS March 2019 and persistent atrial fibrillation s/p CRYO PVI 11/1/22\par \par 1.  AFib\par Maintaining sinus rhythm post procedure.  One episode of AF in the blanking period with spontaneous conversion to SR (as per ILR interrogation)\par - To continue Metoprolol and Eliquis\par \par 2. Stroke risk \par - Continue Eliquis for TE/CVA prophylaxis. \par \par 3.  F/U 6 months, sooner as needed.  [Persistent Atrial Fibrillation] : persistent atrial fibrillation

## 2022-12-19 NOTE — PHYSICAL EXAM
[General Appearance - Well Developed] : well developed [Normal Appearance] : normal appearance [General Appearance - Well Nourished] : well nourished [Heart Sounds] : normal S1 and S2 [Murmurs] : no murmurs present [Arterial Pulses Normal] : the arterial pulses were normal [FreeTextEntry1] : irregularly irregular, tachycardic [] : no respiratory distress [Respiration, Rhythm And Depth] : normal respiratory rhythm and effort [Abdomen Soft] : soft [Abdomen Tenderness] : non-tender [Nail Clubbing] : no clubbing of the fingernails [Cyanosis, Localized] : no localized cyanosis [Normal Conjunctiva] : the conjunctiva exhibited no abnormalities [Normal Oral Mucosa] : normal oral mucosa [Normal Jugular Venous V Waves Present] : normal jugular venous V waves present [Abnormal Walk] : normal gait [Oriented To Time, Place, And Person] : oriented to person, place, and time [Impaired Insight] : insight and judgment were intact [Affect] : the affect was normal

## 2022-12-19 NOTE — HISTORY OF PRESENT ILLNESS
[FreeTextEntry1] : Ms. Brandt is a 62 year old woman with PMHx including atrial fibrillation with ILR s/p DCCV (6/2022) and Grave's disease s/p MEANS (March 2019). She had been in persistent afib over the 1.5 years in the setting of hyperthyroidism. However, she received a radioactive iodine ablation in March 2019 and is now euthyroid. She reports her exercise tolerance dramatically improved (now able to easily climb subway stairs again) and she feels well. She had DCCV 6/15/22 with improvement in breathing status.  She had recurrent AFib with HR up to the 130-140s, now s/p repeat DCCV 9/28/22. \par \par She is s/p CRYO PVI 11/1/22 and presents for post procedure follow-up.  She continues to feel very well and offers no complaints today.

## 2023-04-06 ENCOUNTER — APPOINTMENT (OUTPATIENT)
Dept: ENDOCRINOLOGY | Facility: CLINIC | Age: 63
End: 2023-04-06
Payer: COMMERCIAL

## 2023-04-06 ENCOUNTER — NON-APPOINTMENT (OUTPATIENT)
Age: 63
End: 2023-04-06

## 2023-04-06 VITALS
HEIGHT: 58 IN | DIASTOLIC BLOOD PRESSURE: 84 MMHG | SYSTOLIC BLOOD PRESSURE: 121 MMHG | HEART RATE: 90 BPM | WEIGHT: 176 LBS | BODY MASS INDEX: 36.94 KG/M2

## 2023-04-06 DIAGNOSIS — E66.01 MORBID (SEVERE) OBESITY DUE TO EXCESS CALORIES: ICD-10-CM

## 2023-04-06 PROCEDURE — 99214 OFFICE O/P EST MOD 30 MIN: CPT

## 2023-04-06 NOTE — ASSESSMENT
[Importance of Diet and Exercise] : importance of diet and exercise to improve glycemic control, achieve weight loss and improve cardiovascular health [Weight Loss] : weight loss [Levothyroxine] : The patient was instructed to take Levothyroxine on an empty stomach, separate from vitamins, and wait at least 30 minutes before eating

## 2023-04-10 NOTE — END OF VISIT
[Time Spent: ___ minutes] : I have spent [unfilled] minutes of time on the encounter. [FreeTextEntry3] : All medical record entries made by the Scribe were at my, Dr. Kb Dodge, direction and personally dictated by me on 04/06/2023. I have reviewed the chart and agree that the record accurately reflects my personal performance of the history, physical exam, assessment and plan. I have also personally directed, reviewed and agreed with the chart.

## 2023-04-10 NOTE — DATA REVIEWED
[FreeTextEntry1] : Scanned Labs:\par - 12/22/20 A1c 5.6%, TSH 2.71, Free T4 1.3, s. creat 0.74, Ca 9.7, Vit D 25- OH 16, \par - 10/1/20 A1c 5.6%, TSH 13.83, Free T4 1.0, Ca 10.5, s. creat 0.99, \par

## 2023-04-10 NOTE — HISTORY OF PRESENT ILLNESS
[FreeTextEntry1] : 60 y/o F pt, with Hx of Hypothyroidism developed subsequently s/p MEANS ablation on 3/25/19 for Grave's disease. \par Other PMHx: Obesity\par \par \par 3/22/2022\par Today pt presents for thyroid f/u, feeling well with no major complaints but notes she feels "hot all the time." She did not bring her vmedications with her to today's visit. She states she has been taking Levothyroxine in the morning with her other two medications, Eliquis and Amlodipine.\par Pt has HTN and is seeing a new cardiologist who prescribed blood pressure medication and plans to run multiple tests on pt.  \par Pt is unsure of weight changes. Denies palpitations, cold intolerance, constipation. \par  \par 04/06/2023\par CC: "I''m feeling well" with no physical complaints. \par Pt endorses feeling sleepy. \par \par [Medications verified as per pt on 04/06/2023)  \par Current Medications: Eliquis 5mg BID, Amlodipine.5 mg QD, Levothyroxine 88 mcg, Atorvastatin 20 mg \par

## 2023-04-10 NOTE — REVIEW OF SYSTEMS
[Heat Intolerance] : heat intolerance [As Noted in HPI] : as noted in HPI [Negative] : Constitutional [Palpitations] : no palpitations [Constipation] : no constipation [Cold Intolerance] : no cold intolerance [FreeTextEntry2] : Sleepy

## 2023-04-10 NOTE — PHYSICAL EXAM
[Alert] : alert [Normal Sclera/Conjunctiva] : normal sclera/conjunctiva [Normal Outer Ear/Nose] : the ears and nose were normal in appearance [No Respiratory Distress] : no respiratory distress [Normal Rate] : heart rate was normal [Regular Rhythm] : with a regular rhythm [No Edema] : no peripheral edema [Normal Bowel Sounds] : normal bowel sounds [Spine Straight] : spine straight [No Stigmata of Cushings Syndrome] : no stigmata of Cushings Syndrome [Normal Gait] : normal gait [No Rash] : no rash [Normal Reflexes] : deep tendon reflexes were 2+ and symmetric [Oriented x3] : oriented to person, place, and time [Thyroid Not Enlarged] : the thyroid was not enlarged [No Thyroid Nodules] : no palpable thyroid nodules [de-identified] : thyroid glands not palpated

## 2023-05-30 NOTE — DISCHARGE NOTE ADULT - DO YOU HAVE DIFFICULTY CLIMBING STAIRS
No Detail Level: Generalized Detail Level: Detailed Patient Specific Counseling (Will Not Stick From Patient To Patient): -AK on left cheek noted by patient in HPI

## 2023-05-31 ENCOUNTER — APPOINTMENT (OUTPATIENT)
Dept: HEART AND VASCULAR | Facility: CLINIC | Age: 63
End: 2023-05-31
Payer: COMMERCIAL

## 2023-05-31 ENCOUNTER — NON-APPOINTMENT (OUTPATIENT)
Age: 63
End: 2023-05-31

## 2023-05-31 VITALS
HEART RATE: 89 BPM | OXYGEN SATURATION: 96 % | BODY MASS INDEX: 36.52 KG/M2 | DIASTOLIC BLOOD PRESSURE: 80 MMHG | TEMPERATURE: 98.6 F | SYSTOLIC BLOOD PRESSURE: 100 MMHG | HEIGHT: 58 IN | WEIGHT: 173.99 LBS

## 2023-05-31 PROCEDURE — 93000 ELECTROCARDIOGRAM COMPLETE: CPT

## 2023-05-31 PROCEDURE — 99214 OFFICE O/P EST MOD 30 MIN: CPT | Mod: 25

## 2023-05-31 PROCEDURE — 36415 COLL VENOUS BLD VENIPUNCTURE: CPT

## 2023-05-31 RX ORDER — METOPROLOL TARTRATE 25 MG/1
25 TABLET, FILM COATED ORAL DAILY
Qty: 90 | Refills: 3 | Status: DISCONTINUED | COMMUNITY
Start: 2022-08-22 | End: 2023-05-31

## 2023-05-31 RX ORDER — ATORVASTATIN CALCIUM 20 MG/1
20 TABLET, FILM COATED ORAL
Qty: 90 | Refills: 2 | Status: DISCONTINUED | COMMUNITY
Start: 2022-11-30 | End: 2023-05-31

## 2023-06-01 LAB
ALBUMIN SERPL ELPH-MCNC: 4.4 G/DL
ALP BLD-CCNC: 103 U/L
ALT SERPL-CCNC: 8 U/L
ANION GAP SERPL CALC-SCNC: 13 MMOL/L
AST SERPL-CCNC: 13 U/L
BILIRUB SERPL-MCNC: 0.3 MG/DL
BUN SERPL-MCNC: 13 MG/DL
CALCIUM SERPL-MCNC: 9.8 MG/DL
CHLORIDE SERPL-SCNC: 102 MMOL/L
CHOLEST SERPL-MCNC: 183 MG/DL
CO2 SERPL-SCNC: 27 MMOL/L
CREAT SERPL-MCNC: 0.72 MG/DL
EGFR: 94 ML/MIN/1.73M2
ESTIMATED AVERAGE GLUCOSE: 126 MG/DL
GLUCOSE SERPL-MCNC: 103 MG/DL
HBA1C MFR BLD HPLC: 6 %
HDLC SERPL-MCNC: 71 MG/DL
LDLC SERPL CALC-MCNC: 94 MG/DL
NONHDLC SERPL-MCNC: 112 MG/DL
POTASSIUM SERPL-SCNC: 3.8 MMOL/L
PROT SERPL-MCNC: 7.1 G/DL
SODIUM SERPL-SCNC: 141 MMOL/L
T4 FREE SERPL-MCNC: 1.7 NG/DL
TRIGL SERPL-MCNC: 93 MG/DL
TSH SERPL-ACNC: 0.43 UIU/ML

## 2023-06-01 RX ORDER — METOPROLOL SUCCINATE 25 MG/1
25 TABLET, EXTENDED RELEASE ORAL
Qty: 90 | Refills: 3 | Status: ACTIVE | COMMUNITY
Start: 2023-05-31 | End: 1900-01-01

## 2023-06-01 RX ORDER — AMLODIPINE BESYLATE 5 MG/1
5 TABLET ORAL DAILY
Qty: 90 | Refills: 3 | Status: ACTIVE | COMMUNITY
Start: 2022-02-04 | End: 1900-01-01

## 2023-06-01 RX ORDER — APIXABAN 5 MG/1
5 TABLET, FILM COATED ORAL
Qty: 180 | Refills: 2 | Status: ACTIVE | COMMUNITY
Start: 2018-10-23 | End: 1900-01-01

## 2023-06-01 NOTE — PHYSICAL EXAM

## 2023-06-01 NOTE — HISTORY OF PRESENT ILLNESS
[FreeTextEntry1] : 63 F AFib ablation ILR Graves s/p MEANS Hypothyroid on replacement therapy Gout\par \par here for fu she feels ok she is sob when she walks up the stairs. she is back in afib. her only new symptom is mild sob when she takes the stairs \par \par \par ecg afib  nsst chagnes 5/31/2023\par ZAID 6/15/2022 EF normal

## 2023-06-01 NOTE — ASSESSMENT
[FreeTextEntry1] : HTN controlled on amlodipine 5 mg daily\par Afib chadsvasc score is 2 she is on eliquis 5 mg bid and metoprolol 25 mg daily\par will collaborate with EPS as she is back in afib\par repeat echo \par fu in 6 months

## 2023-06-19 ENCOUNTER — APPOINTMENT (OUTPATIENT)
Dept: HEART AND VASCULAR | Facility: CLINIC | Age: 63
End: 2023-06-19
Payer: COMMERCIAL

## 2023-06-19 VITALS
HEIGHT: 58 IN | BODY MASS INDEX: 36.31 KG/M2 | SYSTOLIC BLOOD PRESSURE: 110 MMHG | WEIGHT: 173 LBS | DIASTOLIC BLOOD PRESSURE: 75 MMHG | OXYGEN SATURATION: 96 % | HEART RATE: 83 BPM

## 2023-06-19 PROCEDURE — 99214 OFFICE O/P EST MOD 30 MIN: CPT | Mod: 25

## 2023-06-19 PROCEDURE — 93285 PRGRMG DEV EVAL SCRMS IP: CPT

## 2023-07-06 NOTE — DISCUSSION/SUMMARY
[Persistent Atrial Fibrillation] : persistent atrial fibrillation [FreeTextEntry1] : 62 year old woman with PMHx including Grave's disease s/p MEANS March 2019 and persistent atrial fibrillation s/p CRYO PVI 11/1/22\par \par 1.  AFib\par Recurrent persistent AFib (72% burden)\par Discussed options for management including no intervention, medical therapy (rate vs. AAD), DCCV and re-do ablation.  After discussion she prefers to move forward with a repeat ablation.  We discussed the procedure in detail including risks, benefits, and alternatives.  I have quoted a 1:700 chance of major complication related to the procedure.  Risks including, but not limited to; infection, anesthesia reaction, bleeding, pain, vascular injury, cardiac perforation, esophageal injury/fistula,  TE/CVA, phrenic nerve injury, arrhythmia recurrence and death were discussed.  \par \par - To continue Metoprolol and Eliquis\par \par 2. Stroke risk \par - Continue Eliquis for TE/CVA prophylaxis. \par \par 3.  F/U 4 weeks post procedure, sooner as needed.

## 2023-07-06 NOTE — HISTORY OF PRESENT ILLNESS
[FreeTextEntry1] : Ms. Brandt is a 63 year old woman with PMHx including atrial fibrillation with ILR s/p DCCV (6/2022) and Grave's disease s/p MEANS (March 2019). She had been in persistent afib over the 1.5 years in the setting of hyperthyroidism. However, she received a radioactive iodine ablation in March 2019 and is now euthyroid. She reports her exercise tolerance dramatically improved (now able to easily climb subway stairs again) and she feels well. She had DCCV 6/15/22 with improvement in breathing status.  She had recurrent AFib with HR up to the 130-140  s/p repeat DCCV 9/28/22. She is s/p CRYO PVI 11/1/22.  She feels that she is back in AFib for the past few months and endorses SOB with stair climbing.  Tolerating Eliquis without bleeding issues at present.  \par \par Notes that she had vaginal bleeding after intercourse a few months ago.  Saw GYN and was told OK to continue AC by her report. \par \par Of note: the patient is scheduled for MRI. Her Medtronic ILR is MRI conditional and requires no reprogramming.

## 2023-07-27 ENCOUNTER — INPATIENT (INPATIENT)
Facility: HOSPITAL | Age: 63
LOS: 0 days | Discharge: HOME | DRG: 274 | End: 2023-07-28
Attending: INTERNAL MEDICINE | Admitting: INTERNAL MEDICINE
Payer: COMMERCIAL

## 2023-07-27 VITALS
RESPIRATION RATE: 18 BRPM | HEIGHT: 58 IN | OXYGEN SATURATION: 100 % | SYSTOLIC BLOOD PRESSURE: 127 MMHG | TEMPERATURE: 99 F | DIASTOLIC BLOOD PRESSURE: 83 MMHG | HEART RATE: 74 BPM | WEIGHT: 182.98 LBS

## 2023-07-27 DIAGNOSIS — I48.0 PAROXYSMAL ATRIAL FIBRILLATION: ICD-10-CM

## 2023-07-27 LAB
ALBUMIN SERPL ELPH-MCNC: 3.5 G/DL — SIGNIFICANT CHANGE UP (ref 3.3–5)
ALBUMIN SERPL ELPH-MCNC: 3.9 G/DL — SIGNIFICANT CHANGE UP (ref 3.3–5)
ALP SERPL-CCNC: 73 U/L — SIGNIFICANT CHANGE UP (ref 40–120)
ALP SERPL-CCNC: 88 U/L — SIGNIFICANT CHANGE UP (ref 40–120)
ALT FLD-CCNC: 9 U/L — LOW (ref 10–45)
ALT FLD-CCNC: SIGNIFICANT CHANGE UP U/L (ref 10–45)
ANION GAP SERPL CALC-SCNC: 12 MMOL/L — SIGNIFICANT CHANGE UP (ref 5–17)
ANION GAP SERPL CALC-SCNC: 9 MMOL/L — SIGNIFICANT CHANGE UP (ref 5–17)
APTT BLD: 35.9 SEC — HIGH (ref 24.5–35.6)
AST SERPL-CCNC: 19 U/L — SIGNIFICANT CHANGE UP (ref 10–40)
AST SERPL-CCNC: SIGNIFICANT CHANGE UP U/L (ref 10–40)
BILIRUB SERPL-MCNC: 0.4 MG/DL — SIGNIFICANT CHANGE UP (ref 0.2–1.2)
BILIRUB SERPL-MCNC: 0.5 MG/DL — SIGNIFICANT CHANGE UP (ref 0.2–1.2)
BUN SERPL-MCNC: 8 MG/DL — SIGNIFICANT CHANGE UP (ref 7–23)
BUN SERPL-MCNC: 9 MG/DL — SIGNIFICANT CHANGE UP (ref 7–23)
CALCIUM SERPL-MCNC: 8.1 MG/DL — LOW (ref 8.4–10.5)
CALCIUM SERPL-MCNC: 9.2 MG/DL — SIGNIFICANT CHANGE UP (ref 8.4–10.5)
CHLORIDE SERPL-SCNC: 104 MMOL/L — SIGNIFICANT CHANGE UP (ref 96–108)
CHLORIDE SERPL-SCNC: 107 MMOL/L — SIGNIFICANT CHANGE UP (ref 96–108)
CO2 SERPL-SCNC: 22 MMOL/L — SIGNIFICANT CHANGE UP (ref 22–31)
CO2 SERPL-SCNC: 26 MMOL/L — SIGNIFICANT CHANGE UP (ref 22–31)
CREAT SERPL-MCNC: 0.65 MG/DL — SIGNIFICANT CHANGE UP (ref 0.5–1.3)
CREAT SERPL-MCNC: 0.69 MG/DL — SIGNIFICANT CHANGE UP (ref 0.5–1.3)
EGFR: 98 ML/MIN/1.73M2 — SIGNIFICANT CHANGE UP
EGFR: 99 ML/MIN/1.73M2 — SIGNIFICANT CHANGE UP
GLUCOSE SERPL-MCNC: 152 MG/DL — HIGH (ref 70–99)
GLUCOSE SERPL-MCNC: 99 MG/DL — SIGNIFICANT CHANGE UP (ref 70–99)
HCT VFR BLD CALC: 35.7 % — SIGNIFICANT CHANGE UP (ref 34.5–45)
HCT VFR BLD CALC: 38.5 % — SIGNIFICANT CHANGE UP (ref 34.5–45)
HGB BLD-MCNC: 11.7 G/DL — SIGNIFICANT CHANGE UP (ref 11.5–15.5)
HGB BLD-MCNC: 12.9 G/DL — SIGNIFICANT CHANGE UP (ref 11.5–15.5)
INR BLD: 1.35 — HIGH (ref 0.85–1.18)
ISTAT INR: 1.2 — HIGH (ref 0.88–1.16)
ISTAT PT: 13.9 SEC — HIGH (ref 10–12.9)
ISTAT VENOUS BE: 5 MMOL/L — HIGH (ref -2–3)
ISTAT VENOUS GLUCOSE: 95 MG/DL — SIGNIFICANT CHANGE UP (ref 70–99)
ISTAT VENOUS HCO3: 32 MMOL/L — HIGH (ref 23–28)
ISTAT VENOUS HEMATOCRIT: 40 % — SIGNIFICANT CHANGE UP (ref 34.5–45)
ISTAT VENOUS HEMOGLOBIN: 13.6 GM/DL — SIGNIFICANT CHANGE UP (ref 11.5–15.5)
ISTAT VENOUS IONIZED CALCIUM: 1.29 MMOL/L — SIGNIFICANT CHANGE UP (ref 1.12–1.3)
ISTAT VENOUS PCO2: 52 MMHG — HIGH (ref 41–51)
ISTAT VENOUS PH: 7.39 — SIGNIFICANT CHANGE UP (ref 7.31–7.41)
ISTAT VENOUS PO2: <66 MMHG — LOW (ref 35–40)
ISTAT VENOUS POTASSIUM: 3.9 MMOL/L — SIGNIFICANT CHANGE UP (ref 3.5–5.3)
ISTAT VENOUS SO2: 32 % — SIGNIFICANT CHANGE UP
ISTAT VENOUS SODIUM: 141 MMOL/L — SIGNIFICANT CHANGE UP (ref 135–145)
ISTAT VENOUS TCO2: 33 MMOL/L — HIGH (ref 22–31)
MCHC RBC-ENTMCNC: 31 PG — SIGNIFICANT CHANGE UP (ref 27–34)
MCHC RBC-ENTMCNC: 31.2 PG — SIGNIFICANT CHANGE UP (ref 27–34)
MCHC RBC-ENTMCNC: 32.8 GM/DL — SIGNIFICANT CHANGE UP (ref 32–36)
MCHC RBC-ENTMCNC: 33.5 GM/DL — SIGNIFICANT CHANGE UP (ref 32–36)
MCV RBC AUTO: 93.2 FL — SIGNIFICANT CHANGE UP (ref 80–100)
MCV RBC AUTO: 94.7 FL — SIGNIFICANT CHANGE UP (ref 80–100)
NRBC # BLD: 0 /100 WBCS — SIGNIFICANT CHANGE UP (ref 0–0)
NRBC # BLD: 0 /100 WBCS — SIGNIFICANT CHANGE UP (ref 0–0)
PLATELET # BLD AUTO: 257 K/UL — SIGNIFICANT CHANGE UP (ref 150–400)
PLATELET # BLD AUTO: 345 K/UL — SIGNIFICANT CHANGE UP (ref 150–400)
POCT ISTAT CREATININE: 0.7 MG/DL — SIGNIFICANT CHANGE UP (ref 0.5–1.3)
POTASSIUM SERPL-MCNC: 3.6 MMOL/L — SIGNIFICANT CHANGE UP (ref 3.5–5.3)
POTASSIUM SERPL-MCNC: SIGNIFICANT CHANGE UP MMOL/L (ref 3.5–5.3)
POTASSIUM SERPL-SCNC: 3.6 MMOL/L — SIGNIFICANT CHANGE UP (ref 3.5–5.3)
POTASSIUM SERPL-SCNC: SIGNIFICANT CHANGE UP MMOL/L (ref 3.5–5.3)
PROT SERPL-MCNC: 6.3 G/DL — SIGNIFICANT CHANGE UP (ref 6–8.3)
PROT SERPL-MCNC: 7.5 G/DL — SIGNIFICANT CHANGE UP (ref 6–8.3)
PROTHROM AB SERPL-ACNC: 15.3 SEC — HIGH (ref 9.5–13)
RBC # BLD: 3.77 M/UL — LOW (ref 3.8–5.2)
RBC # BLD: 4.13 M/UL — SIGNIFICANT CHANGE UP (ref 3.8–5.2)
RBC # FLD: 14.1 % — SIGNIFICANT CHANGE UP (ref 10.3–14.5)
RBC # FLD: 14.1 % — SIGNIFICANT CHANGE UP (ref 10.3–14.5)
SODIUM SERPL-SCNC: 139 MMOL/L — SIGNIFICANT CHANGE UP (ref 135–145)
SODIUM SERPL-SCNC: 141 MMOL/L — SIGNIFICANT CHANGE UP (ref 135–145)
WBC # BLD: 7.27 K/UL — SIGNIFICANT CHANGE UP (ref 3.8–10.5)
WBC # BLD: 9.04 K/UL — SIGNIFICANT CHANGE UP (ref 3.8–10.5)
WBC # FLD AUTO: 7.27 K/UL — SIGNIFICANT CHANGE UP (ref 3.8–10.5)
WBC # FLD AUTO: 9.04 K/UL — SIGNIFICANT CHANGE UP (ref 3.8–10.5)

## 2023-07-27 PROCEDURE — 93656 COMPRE EP EVAL ABLTJ ATR FIB: CPT

## 2023-07-27 PROCEDURE — 92960 CARDIOVERSION ELECTRIC EXT: CPT | Mod: 59

## 2023-07-27 PROCEDURE — 33286 RMVL SUBQ CAR RHYTHM MNTR: CPT

## 2023-07-27 RX ORDER — AMLODIPINE BESYLATE 2.5 MG/1
5 TABLET ORAL DAILY
Refills: 0 | Status: DISCONTINUED | OUTPATIENT
Start: 2023-07-28 | End: 2023-07-28

## 2023-07-27 RX ORDER — SUCRALFATE 1 G
1 TABLET ORAL EVERY 6 HOURS
Refills: 0 | Status: DISCONTINUED | OUTPATIENT
Start: 2023-07-27 | End: 2023-07-28

## 2023-07-27 RX ORDER — APIXABAN 2.5 MG/1
1 TABLET, FILM COATED ORAL
Qty: 0 | Refills: 0 | DISCHARGE

## 2023-07-27 RX ORDER — SODIUM CHLORIDE 9 MG/ML
1000 INJECTION INTRAMUSCULAR; INTRAVENOUS; SUBCUTANEOUS
Refills: 0 | Status: DISCONTINUED | OUTPATIENT
Start: 2023-07-27 | End: 2023-07-28

## 2023-07-27 RX ORDER — ONDANSETRON 8 MG/1
4 TABLET, FILM COATED ORAL ONCE
Refills: 0 | Status: COMPLETED | OUTPATIENT
Start: 2023-07-27 | End: 2023-07-27

## 2023-07-27 RX ORDER — POTASSIUM CHLORIDE 20 MEQ
40 PACKET (EA) ORAL ONCE
Refills: 0 | Status: COMPLETED | OUTPATIENT
Start: 2023-07-27 | End: 2023-07-27

## 2023-07-27 RX ORDER — AMLODIPINE BESYLATE 2.5 MG/1
1 TABLET ORAL
Qty: 0 | Refills: 0 | DISCHARGE

## 2023-07-27 RX ORDER — METOPROLOL TARTRATE 50 MG
1 TABLET ORAL
Refills: 0 | DISCHARGE

## 2023-07-27 RX ORDER — SODIUM CHLORIDE 9 MG/ML
1000 INJECTION INTRAMUSCULAR; INTRAVENOUS; SUBCUTANEOUS ONCE
Refills: 0 | Status: COMPLETED | OUTPATIENT
Start: 2023-07-27 | End: 2023-07-27

## 2023-07-27 RX ORDER — LEVOTHYROXINE SODIUM 125 MCG
88 TABLET ORAL DAILY
Refills: 0 | Status: DISCONTINUED | OUTPATIENT
Start: 2023-07-28 | End: 2023-07-28

## 2023-07-27 RX ORDER — METOPROLOL TARTRATE 50 MG
1 TABLET ORAL
Qty: 0 | Refills: 0 | DISCHARGE

## 2023-07-27 RX ORDER — LEVOTHYROXINE SODIUM 125 MCG
1 TABLET ORAL
Qty: 0 | Refills: 0 | DISCHARGE

## 2023-07-27 RX ORDER — PANTOPRAZOLE SODIUM 20 MG/1
40 TABLET, DELAYED RELEASE ORAL
Refills: 0 | Status: DISCONTINUED | OUTPATIENT
Start: 2023-07-27 | End: 2023-07-28

## 2023-07-27 RX ORDER — METOPROLOL TARTRATE 50 MG
25 TABLET ORAL DAILY
Refills: 0 | Status: DISCONTINUED | OUTPATIENT
Start: 2023-07-28 | End: 2023-07-28

## 2023-07-27 RX ORDER — APIXABAN 2.5 MG/1
5 TABLET, FILM COATED ORAL EVERY 12 HOURS
Refills: 0 | Status: DISCONTINUED | OUTPATIENT
Start: 2023-07-27 | End: 2023-07-28

## 2023-07-27 RX ORDER — SODIUM CHLORIDE 9 MG/ML
1000 INJECTION, SOLUTION INTRAVENOUS
Refills: 0 | Status: DISCONTINUED | OUTPATIENT
Start: 2023-07-27 | End: 2023-07-27

## 2023-07-27 RX ADMIN — SODIUM CHLORIDE 75 MILLILITER(S): 9 INJECTION INTRAMUSCULAR; INTRAVENOUS; SUBCUTANEOUS at 22:29

## 2023-07-27 RX ADMIN — Medication 1 GRAM(S): at 21:49

## 2023-07-27 RX ADMIN — APIXABAN 5 MILLIGRAM(S): 2.5 TABLET, FILM COATED ORAL at 21:49

## 2023-07-27 RX ADMIN — SODIUM CHLORIDE 2000 MILLILITER(S): 9 INJECTION INTRAMUSCULAR; INTRAVENOUS; SUBCUTANEOUS at 19:10

## 2023-07-27 RX ADMIN — ONDANSETRON 4 MILLIGRAM(S): 8 TABLET, FILM COATED ORAL at 21:49

## 2023-07-27 NOTE — CHART NOTE - NSCHARTNOTEFT_GEN_A_CORE
PA called to patient bedside for hypotension post procedure BP 80's/50s. HR 40-50s SB. Patient is s/p Afib Ablation.  pre procedure and SBP initially 110s post procedure. Patient with urinary retention post procedure unable to void and abdomen distended and patient reporting significant discomfort. S/p Straight cath -600 cc urine output. Total IVF in 1900 ccc and Urine output 600 cc; 1300cc total in. Bilateral groins soft no hematoma or bleed. Procedure done under general anesthesia and given straight cath BP expected to decrease. Patient reports fatigue otherwise denies C/P, SOB, palpitations, dizziness. Case discussed with Dr. Lemus who recommends 1L NS and CCU fellow contacted to perform STAT Bedside echo. Continue to monitor closely.

## 2023-07-27 NOTE — PATIENT PROFILE ADULT - FALL HARM RISK - HARM RISK INTERVENTIONS

## 2023-07-27 NOTE — H&P ADULT - PROBLEM SELECTOR PLAN 1
[ ] NPO for re-do ablation.   [ ] Eliquis tonight.   [ ] Bedrest for four hours post-procedure.  [ ] Monitor groins for bleeding/hematoma.

## 2023-07-27 NOTE — H&P ADULT - NSHPLABSRESULTS_GEN_ALL_CORE
12.9   7.27  )-----------( 345      ( 27 Jul 2023 11:56 )             38.5     07-27    139  |  104  |  8   ----------------------------<  99  see note   |  26  |  0.69    Ca    9.2      27 Jul 2023 11:56    TPro  7.5  /  Alb  3.9  /  TBili  0.5  /  DBili  x   /  AST  see note  /  ALT  see note  /  AlkPhos  88  07-27    PT/INR - ( 27 Jul 2023 11:56 )   PT: 15.3 sec;   INR: 1.35       PTT - ( 27 Jul 2023 11:56 )  PTT:35.9 sec

## 2023-07-27 NOTE — CHART NOTE - NSCHARTNOTEFT_GEN_A_CORE
JOSÉ MIGUEL DYER  [id number]    PROCEDURE:  Posterior wall isolation  Right and left femoral vein access  Single transeptal puncture  ILR removal    INDICATION:  Recurrent atrial fibrillation (redo)    ELECTROPHYSIOLOGIST(S):  MD Franck Hernandez MD (Fellow)    ANESTHESIOLOGY:  Ricky Ordoñez MD    FINDINGS:  Pulmonary veins with no evidence of reconnection from prior ablation. New roof line created connecting the superior pulmonary veins and low posterior line created connecting the inferior pulmonary veins. Focal ablation lesions created along the posterior wall for regions of breakthrough. Activation mapping suggestive of epicardial connection.  Trace effusion on ICE pre-procedure; no interval change post-procedure  Successful removal of ILR; 1cm incision made along the left upper parasternal region. Closed with 4.0 subcuticular suture and dermabond.    COMPLICATIONS:  None    RECOMMENDATIONS:  Uninterrupted anticoagulation with apixaban to continue   Continue home metoprolol  Protonix 40 mg po daily  Carafate 1g BID  Monitor overnight   ECG.

## 2023-07-27 NOTE — H&P ADULT - HISTORY OF PRESENT ILLNESS
Ms. Brandt is a 63 year old woman with PMHx Atrial fibrillation with ILR s/p DCCV (6/2022, 9/2022) and cryo PVI 11/2022 and Grave's disease s/p MEANS (March 2019), who presents for re-do AF ablation for persistent AF.        She had been in persistent afib over the 1.5 years in the setting of hyperthyroidism. However, she received a radioactive iodine ablation in March 2019 and is now euthyroid. She reports her exercise tolerance dramatically improved (now able to easily climb subway stairs again) and she feels well. She had DCCV 6/15/22 with improvement in breathing status. She had recurrent AFib with HR up to the 130-140 s/p repeat DCCV 9/28/22. She is s/p CRYO PVI 11/1/22. She feels that she is back in AFib for the past few months and endorses SOB with stair climbing. Tolerating Eliquis without bleeding issues at present.                     Ms. Brandt is a 63 year old woman with PMHx Atrial fibrillation with ILR (now EOS) s/p DCCV (6/2022, 9/2022) and cryo PVI 11/2022 and Grave's disease s/p MEANS (March 2019), who presents for re-do AF ablation for persistent AF and ILR removal.      She reports symptoms of sob, especially on exertion when in AF. She had DCCV 6/15/22 with improvement in breathing status. She had recurrent AFib with HR up to the 130-140, s/p repeat DCCV 9/28/22. She is s/p CRYO PVI 11/1/22. Now with recurrent AF. She presents for re-do ablation. Her loop recorder is also at EOS and will be removed. She presents today in rate controlled AF. Tolerating Eliquis without bleeding issues at present. Last dose this morning.

## 2023-07-27 NOTE — PATIENT PROFILE ADULT - TOBACCO USE
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 Patient is able to maintain good conversation but is AOx1 (oriented to place but not person and time). According to NH papers pt was noted to have increasing confusion and weakness. He was recently diagnosed with Covid 19 on 1/10/22 and completed 14 day quarantine. Also completed 14 days of ceftin for bronchitis. Patient states he has been feeling unwell for many years. Complains of left arm pain at the shoulder and left hip pain. Denies trauma, abdominal pain, fever, chills, cough, or any other acute complaints.  Patient was admitted with altered mental status and acute encephalopathy.  He was found to have a positive UA and urine culture positive for Ecoli.  Patient was treated with Bactrim.  He is now medically stable for discharge  and will complete his antibiotic  course in the facility.

## 2023-07-27 NOTE — H&P ADULT - ASSESSMENT
Ms. Brandt is a 63 year old woman with PMHx Atrial fibrillation with ILR (now EOS) s/p DCCV (6/2022, 9/2022) and cryo PVI 11/2022 and Grave's disease s/p MEANS (March 2019), who presents for re-do AF ablation for persistent AF and ILR removal.

## 2023-07-27 NOTE — PATIENT PROFILE ADULT - NSPROGENBLOODRESTRICT_GEN_A_NUR
Quality 402: Tobacco Use And Help With Quitting Among Adolescents: Patient screened for tobacco and is a smoker AND received Cessation Counseling Quality 130: Documentation Of Current Medications In The Medical Record: Current Medications Documented Quality 431: Preventive Care And Screening: Unhealthy Alcohol Use - Screening: Patient screened for unhealthy alcohol use using a single question and scores less than 2 times per year Quality 110: Preventive Care And Screening: Influenza Immunization: Influenza Immunization not Administered for Documented Reasons. Detail Level: Detailed none

## 2023-07-28 ENCOUNTER — TRANSCRIPTION ENCOUNTER (OUTPATIENT)
Age: 63
End: 2023-07-28

## 2023-07-28 VITALS
HEART RATE: 67 BPM | SYSTOLIC BLOOD PRESSURE: 131 MMHG | DIASTOLIC BLOOD PRESSURE: 68 MMHG | OXYGEN SATURATION: 99 % | TEMPERATURE: 98 F | RESPIRATION RATE: 18 BRPM

## 2023-07-28 PROCEDURE — 85610 PROTHROMBIN TIME: CPT

## 2023-07-28 PROCEDURE — 85730 THROMBOPLASTIN TIME PARTIAL: CPT

## 2023-07-28 PROCEDURE — 93005 ELECTROCARDIOGRAM TRACING: CPT

## 2023-07-28 PROCEDURE — 84132 ASSAY OF SERUM POTASSIUM: CPT

## 2023-07-28 PROCEDURE — C1759: CPT

## 2023-07-28 PROCEDURE — C1766: CPT

## 2023-07-28 PROCEDURE — 85014 HEMATOCRIT: CPT

## 2023-07-28 PROCEDURE — 86901 BLOOD TYPING SEROLOGIC RH(D): CPT

## 2023-07-28 PROCEDURE — C1760: CPT

## 2023-07-28 PROCEDURE — C2630: CPT

## 2023-07-28 PROCEDURE — 93010 ELECTROCARDIOGRAM REPORT: CPT

## 2023-07-28 PROCEDURE — 80053 COMPREHEN METABOLIC PANEL: CPT

## 2023-07-28 PROCEDURE — 85347 COAGULATION TIME ACTIVATED: CPT

## 2023-07-28 PROCEDURE — C1769: CPT

## 2023-07-28 PROCEDURE — 86850 RBC ANTIBODY SCREEN: CPT

## 2023-07-28 PROCEDURE — 82803 BLOOD GASES ANY COMBINATION: CPT

## 2023-07-28 PROCEDURE — 86900 BLOOD TYPING SEROLOGIC ABO: CPT

## 2023-07-28 PROCEDURE — C1730: CPT

## 2023-07-28 PROCEDURE — C1894: CPT

## 2023-07-28 PROCEDURE — 84295 ASSAY OF SERUM SODIUM: CPT

## 2023-07-28 PROCEDURE — 85027 COMPLETE CBC AUTOMATED: CPT

## 2023-07-28 PROCEDURE — 82330 ASSAY OF CALCIUM: CPT

## 2023-07-28 PROCEDURE — 36415 COLL VENOUS BLD VENIPUNCTURE: CPT

## 2023-07-28 PROCEDURE — 82565 ASSAY OF CREATININE: CPT

## 2023-07-28 PROCEDURE — 82947 ASSAY GLUCOSE BLOOD QUANT: CPT

## 2023-07-28 RX ORDER — SUCRALFATE 1 G
10 TABLET ORAL
Qty: 280 | Refills: 0
Start: 2023-07-28 | End: 2023-08-03

## 2023-07-28 RX ORDER — PANTOPRAZOLE SODIUM 20 MG/1
1 TABLET, DELAYED RELEASE ORAL
Qty: 30 | Refills: 0
Start: 2023-07-28 | End: 2023-08-26

## 2023-07-28 RX ADMIN — Medication 40 MILLIEQUIVALENT(S): at 00:44

## 2023-07-28 RX ADMIN — PANTOPRAZOLE SODIUM 40 MILLIGRAM(S): 20 TABLET, DELAYED RELEASE ORAL at 06:17

## 2023-07-28 RX ADMIN — Medication 88 MICROGRAM(S): at 06:11

## 2023-07-28 RX ADMIN — AMLODIPINE BESYLATE 5 MILLIGRAM(S): 2.5 TABLET ORAL at 08:00

## 2023-07-28 RX ADMIN — APIXABAN 5 MILLIGRAM(S): 2.5 TABLET, FILM COATED ORAL at 07:59

## 2023-07-28 RX ADMIN — Medication 1 GRAM(S): at 06:08

## 2023-07-28 RX ADMIN — Medication 1 GRAM(S): at 10:25

## 2023-07-28 NOTE — DISCHARGE NOTE PROVIDER - NSDCCPCAREPLAN_GEN_ALL_CORE_FT
PRINCIPAL DISCHARGE DIAGNOSIS  Diagnosis: Paroxysmal atrial fibrillation  Assessment and Plan of Treatment:       SECONDARY DISCHARGE DIAGNOSES  Diagnosis: HTN (hypertension)  Assessment and Plan of Treatment:     Diagnosis: Hypothyroidism  Assessment and Plan of Treatment:

## 2023-07-28 NOTE — DISCHARGE NOTE PROVIDER - NSDCFUSCHEDAPPT_GEN_ALL_CORE_FT
Kb Dodge  [facility]  [facility] [address]  Scheduled Appointment: 10/09/2023

## 2023-07-28 NOTE — DISCHARGE NOTE PROVIDER - HOSPITAL COURSE
Subjective:    pt is s/p ablation: redo AF ablation with PWI, + ILR removal   vitals stable overnight  telemetry shows  patient feeling     denies chest pain, palpitations, dizziness, syncope, nausea, vomiting, diarrhea, groin pain, facial droop or extremity weakness.     Inpatient Medications:   amLODIPine   Tablet 5 milliGRAM(s) Oral daily  apixaban 5 milliGRAM(s) Oral every 12 hours  levothyroxine 88 MICROGram(s) Oral daily  metoprolol succinate ER 25 milliGRAM(s) Oral daily  pantoprazole    Tablet 40 milliGRAM(s) Oral before breakfast  sodium chloride 0.9%. 1000 milliLiter(s) IV Continuous <Continuous>  sucralfate suspension 1 Gram(s) Oral every 6 hours      Allergies: No Known Allergies      ROS:   CONSTITUTIONAL: No fever, weight loss + fatigue  EYES: Pt denies  RESPIRATORY: No cough, wheezing, chills or hemoptysis; No Shortness of Breath  CARDIOVASCULAR: see HPI  GASTROINTESTINAL: Pt denies  NEUROLOGICAL: Pt denies  SKIN: Pt denies   PSYCHIATRIC: Pt denies  HEME/LYMPH: Pt denies    PHYSICAL:  T(C): 37 (07-27-23 @ 12:36), Max: 37 (07-27-23 @ 12:36)  HR: 62 (07-28-23 @ 08:12) (48 - 74)  BP: 133/66 (07-28-23 @ 08:12) (94/57 - 133/66)  RR: 18 (07-28-23 @ 08:12) (17 - 18)  SpO2: 98% (07-28-23 @ 08:12) (96% - 100%)  Appearance: No acute distress, well developed  Eyes: normal appearing conjunctiva, pupils and eyelids  Cardiovascular: Normal S1 S2, No JVD, No murmurs, No edema  Respiratory: Lungs clear to auscultation	bilaterally.  No wheeze, rhonchi, rales noted  Gastrointestinal:  Soft, NT/ND 	  Neurologic:  No deficit noted  Psych: A&Ox3, normal mood/affect  Extremities: Groin exam:       LABS:                        11.7   9.04  )-----------( 257      ( 27 Jul 2023 22:41 )             35.7     07-27    141  |  107  |  9   ----------------------------<  152<H>  3.6   |  22  |  0.65    Ca    8.1<L>      27 Jul 2023 22:41    TPro  6.3  /  Alb  3.5  /  TBili  0.4  /  DBili  x   /  AST  19  /  ALT  9<L>  /  AlkPhos  73  07-27    PT/INR - ( 27 Jul 2023 11:56 )   PT: 15.3 sec;   INR: 1.35          PTT - ( 27 Jul 2023 11:56 )  PTT:35.9 sec  TSH  Troponin      Assessment Plan:  63Fwith PMHx Atrial fibrillation with ILR (now EOS) s/p DCCV (6/2022, 9/2022) and cryo PVI 11/2022 and Grave's disease s/p MEANS (March 2019), who presented for re-do AF ablation for persistent AF and ILR removal.     -cont uninterrupted AC with eliquis  -start PPI x 30days  -sucralfate liquid suspension 1g QID x7 days  -post procedure instructions given to the patient  -ok to dc home today      Subjective:    pt is s/p ablation: redo AF ablation with PWI, + ILR removal   vitals stable overnight  telemetry shows SR  patient feeling well no complaints     denies chest pain, palpitations, dizziness, syncope, nausea, vomiting, diarrhea, groin pain, facial droop or extremity weakness.     Inpatient Medications:   amLODIPine   Tablet 5 milliGRAM(s) Oral daily  apixaban 5 milliGRAM(s) Oral every 12 hours  levothyroxine 88 MICROGram(s) Oral daily  metoprolol succinate ER 25 milliGRAM(s) Oral daily  pantoprazole    Tablet 40 milliGRAM(s) Oral before breakfast  sodium chloride 0.9%. 1000 milliLiter(s) IV Continuous <Continuous>  sucralfate suspension 1 Gram(s) Oral every 6 hours      Allergies: No Known Allergies      ROS:   CONSTITUTIONAL: No fever, weight loss + fatigue  EYES: Pt denies  RESPIRATORY: No cough, wheezing, chills or hemoptysis; No Shortness of Breath  CARDIOVASCULAR: see HPI  GASTROINTESTINAL: Pt denies  NEUROLOGICAL: Pt denies  SKIN: Pt denies   PSYCHIATRIC: Pt denies  HEME/LYMPH: Pt denies    PHYSICAL:  T(C): 37 (07-27-23 @ 12:36), Max: 37 (07-27-23 @ 12:36)  HR: 62 (07-28-23 @ 08:12) (48 - 74)  BP: 133/66 (07-28-23 @ 08:12) (94/57 - 133/66)  RR: 18 (07-28-23 @ 08:12) (17 - 18)  SpO2: 98% (07-28-23 @ 08:12) (96% - 100%)  Appearance: No acute distress, well developed  Eyes: normal appearing conjunctiva, pupils and eyelids  Cardiovascular: Normal S1 S2, No JVD, No murmurs, No edema  Respiratory: Lungs clear to auscultation	bilaterally.  No wheeze, rhonchi, rales noted  Gastrointestinal:  Soft, NT/ND 	  Neurologic:  No deficit noted  Psych: A&Ox3, normal mood/affect  Extremities: Groin exam: b/l groin soft nontender no bleeding or hematoma.        LABS:                        11.7   9.04  )-----------( 257      ( 27 Jul 2023 22:41 )             35.7     07-27    141  |  107  |  9   ----------------------------<  152<H>  3.6   |  22  |  0.65    Ca    8.1<L>      27 Jul 2023 22:41    TPro  6.3  /  Alb  3.5  /  TBili  0.4  /  DBili  x   /  AST  19  /  ALT  9<L>  /  AlkPhos  73  07-27    PT/INR - ( 27 Jul 2023 11:56 )   PT: 15.3 sec;   INR: 1.35          PTT - ( 27 Jul 2023 11:56 )  PTT:35.9 sec  TSH  Troponin      Assessment Plan:  63Fwith PMHx Atrial fibrillation with ILR (now EOS) s/p DCCV (6/2022, 9/2022) and cryo PVI 11/2022 and Grave's disease s/p MEANS (March 2019), who presented for re-do AF ablation for persistent AF and ILR removal.     -cont uninterrupted AC with eliquis  -start PPI x 30days  -sucralfate liquid suspension 1g QID x7 days  -post procedure instructions given to the patient  -ok to dc home today

## 2023-07-28 NOTE — DISCHARGE NOTE NURSING/CASE MANAGEMENT/SOCIAL WORK - PATIENT PORTAL LINK FT
You can access the FollowMyHealth Patient Portal offered by [facility] by registering at the following website: [email or web address]. By joining [email or web address]’s FollowMyHealth portal, you will also be able to view your health information using other applications (apps) compatible with our system.

## 2023-07-28 NOTE — DISCHARGE NOTE PROVIDER - NSDCMRMEDTOKEN_GEN_ALL_CORE_FT
amLODIPine 5 mg oral tablet: 1 tab(s) orally once a day  Eliquis 5 mg oral tablet: 1 tab(s) orally 2 times a day   levothyroxine 88 mcg (0.088 mg) oral tablet: 1 tab(s) orally once a day  metoprolol succinate 25 mg oral tablet, extended release: 1 orally once a day   amLODIPine 5 mg oral tablet: 1 tab(s) orally once a day  Eliquis 5 mg oral tablet: 1 tab(s) orally 2 times a day   levothyroxine 88 mcg (0.088 mg) oral tablet: 1 tab(s) orally once a day  metoprolol succinate 25 mg oral tablet, extended release: 1 orally once a day  pantoprazole 40 mg oral delayed release tablet: 1 tab(s) orally once a day (before a meal) x 30 days  sucralfate 1 g/10 mL oral suspension: 10 milliliter(s) orally every 6 hours x 7 days

## 2023-07-28 NOTE — DISCHARGE NOTE NURSING/CASE MANAGEMENT/SOCIAL WORK - NSDCPEFALRISK_GEN_ALL_CORE
For information on Fall & Injury Prevention, visit: [email or web address] OR  [email or web address] OR  https://www.cdc.gov/steadi/patient.html

## 2023-07-28 NOTE — DISCHARGE NOTE PROVIDER - CARE PROVIDER_API CALL
Janie Lemus  Cardiac Electrophysiology  [address], [address] [place] [address]  Phone: [phone]  Fax: [phone]  Established Patient  Follow Up Time: 1 month

## 2023-08-02 LAB
ISTAT ACTK (ACTIVATED CLOTTING TIME KAOLIN): 329 SEC — HIGH (ref 74–137)
ISTAT ACTK (ACTIVATED CLOTTING TIME KAOLIN): 329 SEC — HIGH (ref 74–137)
ISTAT ACTK (ACTIVATED CLOTTING TIME KAOLIN): 335 SEC — HIGH (ref 74–137)

## 2023-08-03 DIAGNOSIS — I48.19 OTHER PERSISTENT ATRIAL FIBRILLATION: ICD-10-CM

## 2023-08-03 DIAGNOSIS — R33.9 RETENTION OF URINE, UNSPECIFIED: ICD-10-CM

## 2023-08-03 DIAGNOSIS — I95.81 POSTPROCEDURAL HYPOTENSION: ICD-10-CM

## 2023-08-03 DIAGNOSIS — I10 ESSENTIAL (PRIMARY) HYPERTENSION: ICD-10-CM

## 2023-08-03 DIAGNOSIS — Z79.01 LONG TERM (CURRENT) USE OF ANTICOAGULANTS: ICD-10-CM

## 2023-08-03 DIAGNOSIS — I48.0 PAROXYSMAL ATRIAL FIBRILLATION: ICD-10-CM

## 2023-08-03 DIAGNOSIS — Z79.890 HORMONE REPLACEMENT THERAPY: ICD-10-CM

## 2023-08-03 DIAGNOSIS — E05.00 THYROTOXICOSIS WITH DIFFUSE GOITER WITHOUT THYROTOXIC CRISIS OR STORM: ICD-10-CM

## 2023-08-03 DIAGNOSIS — E03.9 HYPOTHYROIDISM, UNSPECIFIED: ICD-10-CM

## 2023-09-11 ENCOUNTER — APPOINTMENT (OUTPATIENT)
Dept: HEART AND VASCULAR | Facility: CLINIC | Age: 63
End: 2023-09-11
Payer: COMMERCIAL

## 2023-09-11 ENCOUNTER — NON-APPOINTMENT (OUTPATIENT)
Age: 63
End: 2023-09-11

## 2023-09-11 VITALS
BODY MASS INDEX: 36.31 KG/M2 | HEART RATE: 45 BPM | SYSTOLIC BLOOD PRESSURE: 137 MMHG | DIASTOLIC BLOOD PRESSURE: 68 MMHG | TEMPERATURE: 97.9 F | HEIGHT: 58 IN | WEIGHT: 173 LBS

## 2023-09-11 PROCEDURE — 99213 OFFICE O/P EST LOW 20 MIN: CPT | Mod: 25

## 2023-09-11 PROCEDURE — 93000 ELECTROCARDIOGRAM COMPLETE: CPT

## 2023-09-11 RX ORDER — FLUTICASONE PROPIONATE 50 UG/1
50 SPRAY, METERED NASAL
Qty: 16 | Refills: 0 | Status: COMPLETED | COMMUNITY
Start: 2023-05-22

## 2023-09-11 RX ORDER — AMOXICILLIN 875 MG/1
875 TABLET, FILM COATED ORAL
Qty: 14 | Refills: 0 | Status: COMPLETED | COMMUNITY
Start: 2023-07-10

## 2023-09-11 RX ORDER — SUCRALFATE 1 G/10ML
1 SUSPENSION ORAL
Qty: 280 | Refills: 0 | Status: COMPLETED | COMMUNITY
Start: 2023-07-28

## 2023-09-11 RX ORDER — PANTOPRAZOLE 40 MG/1
40 TABLET, DELAYED RELEASE ORAL
Qty: 30 | Refills: 0 | Status: COMPLETED | COMMUNITY
Start: 2023-08-24

## 2023-11-30 ENCOUNTER — NON-APPOINTMENT (OUTPATIENT)
Age: 63
End: 2023-11-30

## 2023-11-30 ENCOUNTER — APPOINTMENT (OUTPATIENT)
Dept: HEART AND VASCULAR | Facility: CLINIC | Age: 63
End: 2023-11-30
Payer: COMMERCIAL

## 2023-11-30 VITALS
HEART RATE: 61 BPM | TEMPERATURE: 97.8 F | BODY MASS INDEX: 35.89 KG/M2 | SYSTOLIC BLOOD PRESSURE: 120 MMHG | HEIGHT: 58 IN | OXYGEN SATURATION: 98 % | WEIGHT: 171 LBS | DIASTOLIC BLOOD PRESSURE: 78 MMHG

## 2023-11-30 PROCEDURE — 93000 ELECTROCARDIOGRAM COMPLETE: CPT

## 2023-11-30 PROCEDURE — 99214 OFFICE O/P EST MOD 30 MIN: CPT | Mod: 25

## 2023-11-30 PROCEDURE — 36415 COLL VENOUS BLD VENIPUNCTURE: CPT

## 2023-12-04 LAB
ALBUMIN SERPL ELPH-MCNC: 4.3 G/DL
ALP BLD-CCNC: 103 U/L
ALT SERPL-CCNC: 10 U/L
ANION GAP SERPL CALC-SCNC: 12 MMOL/L
AST SERPL-CCNC: 11 U/L
BILIRUB SERPL-MCNC: 0.2 MG/DL
BUN SERPL-MCNC: 12 MG/DL
CALCIUM SERPL-MCNC: 9.5 MG/DL
CHLORIDE SERPL-SCNC: 105 MMOL/L
CHOLEST SERPL-MCNC: 209 MG/DL
CO2 SERPL-SCNC: 25 MMOL/L
CREAT SERPL-MCNC: 0.9 MG/DL
CREAT SPEC-SCNC: 224 MG/DL
EGFR: 72 ML/MIN/1.73M2
ESTIMATED AVERAGE GLUCOSE: 114 MG/DL
GLUCOSE SERPL-MCNC: 99 MG/DL
HBA1C MFR BLD HPLC: 5.6 %
HCT VFR BLD CALC: 37.7 %
HDLC SERPL-MCNC: 68 MG/DL
HGB BLD-MCNC: 12.1 G/DL
LDLC SERPL CALC-MCNC: 113 MG/DL
MCHC RBC-ENTMCNC: 30.2 PG
MCHC RBC-ENTMCNC: 32.1 GM/DL
MCV RBC AUTO: 94 FL
MICROALBUMIN 24H UR DL<=1MG/L-MCNC: 3.7 MG/DL
MICROALBUMIN/CREAT 24H UR-RTO: 17 MG/G
NONHDLC SERPL-MCNC: 141 MG/DL
NT-PROBNP SERPL-MCNC: 95 PG/ML
PLATELET # BLD AUTO: 335 K/UL
POTASSIUM SERPL-SCNC: 3.9 MMOL/L
PROT SERPL-MCNC: 7.6 G/DL
RBC # BLD: 4.01 M/UL
RBC # FLD: 14 %
SODIUM SERPL-SCNC: 142 MMOL/L
TRIGL SERPL-MCNC: 160 MG/DL
WBC # FLD AUTO: 7.47 K/UL

## 2023-12-17 ENCOUNTER — EMERGENCY (EMERGENCY)
Facility: HOSPITAL | Age: 63
LOS: 1 days | Discharge: HOME | End: 2023-12-17
Attending: EMERGENCY MEDICINE | Admitting: EMERGENCY MEDICINE
Payer: COMMERCIAL

## 2023-12-17 VITALS
SYSTOLIC BLOOD PRESSURE: 129 MMHG | HEART RATE: 50 BPM | HEIGHT: 59 IN | RESPIRATION RATE: 18 BRPM | TEMPERATURE: 98 F | DIASTOLIC BLOOD PRESSURE: 83 MMHG | OXYGEN SATURATION: 98 % | WEIGHT: 175.93 LBS

## 2023-12-17 VITALS
HEART RATE: 47 BPM | TEMPERATURE: 98 F | SYSTOLIC BLOOD PRESSURE: 146 MMHG | DIASTOLIC BLOOD PRESSURE: 87 MMHG | RESPIRATION RATE: 18 BRPM | OXYGEN SATURATION: 97 %

## 2023-12-17 DIAGNOSIS — E03.9 HYPOTHYROIDISM, UNSPECIFIED: ICD-10-CM

## 2023-12-17 DIAGNOSIS — R00.1 BRADYCARDIA, UNSPECIFIED: ICD-10-CM

## 2023-12-17 DIAGNOSIS — I10 ESSENTIAL (PRIMARY) HYPERTENSION: ICD-10-CM

## 2023-12-17 DIAGNOSIS — Z79.01 LONG TERM (CURRENT) USE OF ANTICOAGULANTS: ICD-10-CM

## 2023-12-17 DIAGNOSIS — Z20.822 CONTACT WITH AND (SUSPECTED) EXPOSURE TO COVID-19: ICD-10-CM

## 2023-12-17 DIAGNOSIS — J10.1 INFLUENZA DUE TO OTHER IDENTIFIED INFLUENZA VIRUS WITH OTHER RESPIRATORY MANIFESTATIONS: ICD-10-CM

## 2023-12-17 DIAGNOSIS — R09.81 NASAL CONGESTION: ICD-10-CM

## 2023-12-17 DIAGNOSIS — J18.9 PNEUMONIA, UNSPECIFIED ORGANISM: ICD-10-CM

## 2023-12-17 DIAGNOSIS — I48.91 UNSPECIFIED ATRIAL FIBRILLATION: ICD-10-CM

## 2023-12-17 LAB
ANION GAP SERPL CALC-SCNC: 9 MMOL/L — SIGNIFICANT CHANGE UP (ref 5–17)
ANION GAP SERPL CALC-SCNC: 9 MMOL/L — SIGNIFICANT CHANGE UP (ref 5–17)
BUN SERPL-MCNC: 9 MG/DL — SIGNIFICANT CHANGE UP (ref 7–23)
BUN SERPL-MCNC: 9 MG/DL — SIGNIFICANT CHANGE UP (ref 7–23)
CALCIUM SERPL-MCNC: 9.5 MG/DL — SIGNIFICANT CHANGE UP (ref 8.4–10.5)
CALCIUM SERPL-MCNC: 9.5 MG/DL — SIGNIFICANT CHANGE UP (ref 8.4–10.5)
CHLORIDE SERPL-SCNC: 100 MMOL/L — SIGNIFICANT CHANGE UP (ref 96–108)
CHLORIDE SERPL-SCNC: 100 MMOL/L — SIGNIFICANT CHANGE UP (ref 96–108)
CO2 SERPL-SCNC: 28 MMOL/L — SIGNIFICANT CHANGE UP (ref 22–31)
CO2 SERPL-SCNC: 28 MMOL/L — SIGNIFICANT CHANGE UP (ref 22–31)
CREAT SERPL-MCNC: 0.66 MG/DL — SIGNIFICANT CHANGE UP (ref 0.5–1.3)
CREAT SERPL-MCNC: 0.66 MG/DL — SIGNIFICANT CHANGE UP (ref 0.5–1.3)
EGFR: 99 ML/MIN/1.73M2 — SIGNIFICANT CHANGE UP
EGFR: 99 ML/MIN/1.73M2 — SIGNIFICANT CHANGE UP
FLUAV AG NPH QL: DETECTED
FLUAV AG NPH QL: DETECTED
FLUBV AG NPH QL: SIGNIFICANT CHANGE UP
FLUBV AG NPH QL: SIGNIFICANT CHANGE UP
GLUCOSE SERPL-MCNC: 106 MG/DL — HIGH (ref 70–99)
GLUCOSE SERPL-MCNC: 106 MG/DL — HIGH (ref 70–99)
HCT VFR BLD CALC: 34.3 % — LOW (ref 34.5–45)
HCT VFR BLD CALC: 34.3 % — LOW (ref 34.5–45)
HGB BLD-MCNC: 11.5 G/DL — SIGNIFICANT CHANGE UP (ref 11.5–15.5)
HGB BLD-MCNC: 11.5 G/DL — SIGNIFICANT CHANGE UP (ref 11.5–15.5)
MCHC RBC-ENTMCNC: 30.3 PG — SIGNIFICANT CHANGE UP (ref 27–34)
MCHC RBC-ENTMCNC: 30.3 PG — SIGNIFICANT CHANGE UP (ref 27–34)
MCHC RBC-ENTMCNC: 33.5 GM/DL — SIGNIFICANT CHANGE UP (ref 32–36)
MCHC RBC-ENTMCNC: 33.5 GM/DL — SIGNIFICANT CHANGE UP (ref 32–36)
MCV RBC AUTO: 90.5 FL — SIGNIFICANT CHANGE UP (ref 80–100)
MCV RBC AUTO: 90.5 FL — SIGNIFICANT CHANGE UP (ref 80–100)
NRBC # BLD: 0 /100 WBCS — SIGNIFICANT CHANGE UP (ref 0–0)
NRBC # BLD: 0 /100 WBCS — SIGNIFICANT CHANGE UP (ref 0–0)
PLATELET # BLD AUTO: 262 K/UL — SIGNIFICANT CHANGE UP (ref 150–400)
PLATELET # BLD AUTO: 262 K/UL — SIGNIFICANT CHANGE UP (ref 150–400)
POTASSIUM SERPL-MCNC: 3.7 MMOL/L — SIGNIFICANT CHANGE UP (ref 3.5–5.3)
POTASSIUM SERPL-MCNC: 3.7 MMOL/L — SIGNIFICANT CHANGE UP (ref 3.5–5.3)
POTASSIUM SERPL-SCNC: 3.7 MMOL/L — SIGNIFICANT CHANGE UP (ref 3.5–5.3)
POTASSIUM SERPL-SCNC: 3.7 MMOL/L — SIGNIFICANT CHANGE UP (ref 3.5–5.3)
RBC # BLD: 3.79 M/UL — LOW (ref 3.8–5.2)
RBC # BLD: 3.79 M/UL — LOW (ref 3.8–5.2)
RBC # FLD: 13.5 % — SIGNIFICANT CHANGE UP (ref 10.3–14.5)
RBC # FLD: 13.5 % — SIGNIFICANT CHANGE UP (ref 10.3–14.5)
RSV RNA NPH QL NAA+NON-PROBE: SIGNIFICANT CHANGE UP
RSV RNA NPH QL NAA+NON-PROBE: SIGNIFICANT CHANGE UP
SARS-COV-2 RNA SPEC QL NAA+PROBE: SIGNIFICANT CHANGE UP
SARS-COV-2 RNA SPEC QL NAA+PROBE: SIGNIFICANT CHANGE UP
SODIUM SERPL-SCNC: 137 MMOL/L — SIGNIFICANT CHANGE UP (ref 135–145)
SODIUM SERPL-SCNC: 137 MMOL/L — SIGNIFICANT CHANGE UP (ref 135–145)
WBC # BLD: 6.06 K/UL — SIGNIFICANT CHANGE UP (ref 3.8–10.5)
WBC # BLD: 6.06 K/UL — SIGNIFICANT CHANGE UP (ref 3.8–10.5)
WBC # FLD AUTO: 6.06 K/UL — SIGNIFICANT CHANGE UP (ref 3.8–10.5)
WBC # FLD AUTO: 6.06 K/UL — SIGNIFICANT CHANGE UP (ref 3.8–10.5)

## 2023-12-17 PROCEDURE — 99284 EMERGENCY DEPT VISIT MOD MDM: CPT

## 2023-12-17 PROCEDURE — 99284 EMERGENCY DEPT VISIT MOD MDM: CPT | Mod: 25

## 2023-12-17 PROCEDURE — 36415 COLL VENOUS BLD VENIPUNCTURE: CPT

## 2023-12-17 PROCEDURE — 71046 X-RAY EXAM CHEST 2 VIEWS: CPT | Mod: 26

## 2023-12-17 PROCEDURE — 80048 BASIC METABOLIC PNL TOTAL CA: CPT

## 2023-12-17 PROCEDURE — 85027 COMPLETE CBC AUTOMATED: CPT

## 2023-12-17 PROCEDURE — 94640 AIRWAY INHALATION TREATMENT: CPT

## 2023-12-17 PROCEDURE — 71046 X-RAY EXAM CHEST 2 VIEWS: CPT

## 2023-12-17 PROCEDURE — 87637 SARSCOV2&INF A&B&RSV AMP PRB: CPT

## 2023-12-17 PROCEDURE — 96374 THER/PROPH/DIAG INJ IV PUSH: CPT

## 2023-12-17 RX ORDER — CEFPODOXIME PROXETIL 100 MG
200 TABLET ORAL ONCE
Refills: 0 | Status: COMPLETED | OUTPATIENT
Start: 2023-12-17 | End: 2023-12-17

## 2023-12-17 RX ORDER — FLUTICASONE PROPIONATE 50 MCG
1 SPRAY, SUSPENSION NASAL ONCE
Refills: 0 | Status: COMPLETED | OUTPATIENT
Start: 2023-12-17 | End: 2023-12-17

## 2023-12-17 RX ORDER — AZITHROMYCIN 500 MG/1
1 TABLET, FILM COATED ORAL
Qty: 4 | Refills: 0
Start: 2023-12-17

## 2023-12-17 RX ORDER — KETOROLAC TROMETHAMINE 30 MG/ML
15 SYRINGE (ML) INJECTION ONCE
Refills: 0 | Status: DISCONTINUED | OUTPATIENT
Start: 2023-12-17 | End: 2023-12-17

## 2023-12-17 RX ORDER — AZITHROMYCIN 500 MG/1
500 TABLET, FILM COATED ORAL ONCE
Refills: 0 | Status: COMPLETED | OUTPATIENT
Start: 2023-12-17 | End: 2023-12-17

## 2023-12-17 RX ORDER — CEFPODOXIME PROXETIL 100 MG
1 TABLET ORAL
Qty: 9 | Refills: 0
Start: 2023-12-17

## 2023-12-17 RX ADMIN — Medication 1 SPRAY(S): at 17:14

## 2023-12-17 RX ADMIN — Medication 15 MILLIGRAM(S): at 16:28

## 2023-12-17 RX ADMIN — Medication 200 MILLIGRAM(S): at 18:21

## 2023-12-17 RX ADMIN — Medication 75 MILLIGRAM(S): at 18:20

## 2023-12-17 RX ADMIN — Medication 15 MILLIGRAM(S): at 18:33

## 2023-12-17 RX ADMIN — Medication 200 MILLIGRAM(S): at 16:28

## 2023-12-17 RX ADMIN — AZITHROMYCIN 500 MILLIGRAM(S): 500 TABLET, FILM COATED ORAL at 18:21

## 2023-12-17 NOTE — ED PROVIDER NOTE - PRINCIPAL DIAGNOSIS
[FreeTextEntry1] : 5/17/22: 69 yo male with BLE lymphedema formerly being treated by Dr. Brown. Pt had BL UNNA boot on for the past week. He complains of pain in the right plantar lateral heel area where he has a small ulcer. He feels the area around the wound is tender. He has had this wound for several months. He denies any fever or chills. Pt is on ASA, Eliquis, and Gabapentin. \par \par 5/24/22: Pt kept UNNA boots on the for the past week. He still has severe pain in his right heel. He states he gets a shooting pain in the foot when he puts pressure on the foot to stand up. He tries to stay off the foot as much as possible. He denies any fever or chills. He is compliant with his medication. \par \par 5/31/22: Pt kept UNNA boots on the for the past week. He still has severe pain in his right heel. He states he gets a shooting pain in the foot when he puts pressure on the foot to stand up. He tries to stay off the foot as much as possible. He denies any fever or chills. He is compliant with his medication. \par \par 6/7/22: Pt kept UNNA boots on the for the past week. He still has less pain in his right heel. He states he gets a shooting pain in the foot when he puts pressure on the foot to stand up. He tries to stay off the foot as much as possible. He denies any fever or chills. He is compliant with his medication. \par \par 6/14/22: Pt kept UNNA boots on the for the past week. He still has significant pain in his right heel. He states he gets a shooting pain in the foot when he puts pressure on the foot to stand up. He tries to stay off the foot as much as possible. He denies any fever or chills. He is compliant with his medication. \par  Symptomatic anemia Influenza A

## 2023-12-17 NOTE — ED ADULT NURSE NOTE - OBJECTIVE STATEMENT
Pt is a 63 year old female that presents to the ED with worsening cough and SOB that started on Thursday. Pt reports 10/10 sharp pain in her right sided lumbar region when she coughs.  Pt doesn't know if she had a fever but states she "felt warm," on  Thursday. Pt reports that her manager had COVID. Pt denies chest pain. Pt is AxOx4 and speaking in coherent sentences.

## 2023-12-17 NOTE — ED ADULT TRIAGE NOTE - CHIEF COMPLAINT QUOTE
" I have cough, SOB and upper back pain when coughing." +subjective fever.  denies CP, or palpations, Hx afib

## 2023-12-17 NOTE — ED PROVIDER NOTE - PHYSICAL EXAMINATION
VITAL SIGNS: I have reviewed nursing notes and confirm.  CONSTITUTIONAL:  in no acute distress.   SKIN:  warm and dry, no acute rash.   HEAD:  normocephalic, atraumatic.  EYES: PERRL, EOM intact; conjunctiva and sclera clear.  ENT: No nasal discharge; airway clear. + congestion  NECK: Supple; non tender.  CARD: S1, S2 normal; no murmurs, gallops, or rubs. Regular rate and rhythm.   RESP:  Clear to auscultation b/l, no wheezes, rales or rhonchi.  ABD: Normal bowel sounds; soft; non-distended; non-tender; no guarding/ rebound.  MSK: Normal ROM. No clubbing, cyanosis or edema. no ttp bilat le  NEURO: Alert, oriented, grossly unremarkable  PSYCH: Cooperative, mood and affect appropriate.

## 2023-12-17 NOTE — ED PROVIDER NOTE - PROGRESS NOTE DETAILS
hgb 6.2  RBC ordered.  Will admit. CXR w poss infiltrate.  Flu pos.  Will treat for flu and cap.  Pt feeling better after meds.  Plan dc and pmd fu.

## 2023-12-17 NOTE — ED PROVIDER NOTE - OBJECTIVE STATEMENT
63-year-old female history of A-fib on Eliquis, hypothyroid, hypertension complaining of 4 days of nasal congestion, cough with pleuritic right-sided back pain but no associated chest pain or shortness of breath, generalized malaise.  Patient denies fever, sick contacts, leg swelling or pain, palpitations, abdominal pain, nausea vomiting or diarrhea.  Patient reports taking some Tylenol last night without relief of her pain.  She has not been using any other medications for her symptoms.

## 2023-12-17 NOTE — ED ADULT NURSE NOTE - NSFALLUNIVINTERV_ED_ALL_ED
Bed/Stretcher in lowest position, wheels locked, appropriate side rails in place/Call bell, personal items and telephone in reach/Instruct patient to call for assistance before getting out of bed/chair/stretcher/Non-slip footwear applied when patient is off stretcher/[place] to call system/Physically safe environment - no spills, clutter or unnecessary equipment/Purposeful proactive rounding/Room/bathroom lighting operational, light cord in reach Bed/Stretcher in lowest position, wheels locked, appropriate side rails in place/Call bell, personal items and telephone in reach/Instruct patient to call for assistance before getting out of bed/chair/stretcher/Non-slip footwear applied when patient is off stretcher/[place] to call system/Physically safe environment - no spills, clutter or unnecessary equipment/Purposeful proactive rounding/Room/bathroom lighting operational, light cord in reach

## 2023-12-17 NOTE — ED PROVIDER NOTE - NSFOLLOWUPINSTRUCTIONS_ED_ALL_ED_FT
Flu A  Pneumonia    Please take tamiflu twice a day for 5 days for your flu infection.  Take cefpodoxime twice a day and azithromycin once a day for 5 days.  You received your first dose of all of these in the ER.     Take Tessalon Perles (Benzonatate) 1 capsule every 6-8 hours as needed for cough.     Use tylenol as needed for pain - use as directed.     Use flonase (fluticasone) -  1 sprays each nostril twice a day for nasal congestion as needed.    Pneumonia    Pneumonia is an infection of the lungs. Pneumonia may be caused by bacteria, viruses, or funguses. Symptoms include coughing, fever, chest pain when breathing deeply or coughing, shortness of breath, fatigue, or muscle aches. Pneumonia can be diagnosed with a medical history and physical exam, as well as other tests which may include a chest X-ray. If you were prescribed an antibiotic medicine, take it as told by your health care provider and do not stop taking the antibiotic even if you start to feel better. Do not use tobacco products, including cigarettes, chewing tobacco, and e-cigarettes.    SEEK IMMEDIATE MEDICAL CARE IF YOU HAVE ANY OF THE FOLLOWING SYMPTOMS: worsening shortness of breath, worsening chest pain, coughing up blood, change in mental status, lightheadedness/dizziness.    --    Influenza    WHAT YOU NEED TO KNOW:    What is influenza? Influenza (the flu) is an infection caused by the influenza virus. The virus spreads through direct contact with someone who has the flu. For example, a person with the virus on his or her hands can spread it by shaking hands with someone. You may be able to spread the flu to others for 1 week or longer after signs or symptoms appear.    What increases my risk for the flu?    Living with or caring for someone who has the flu    Living in a nursing home or long-term care facility    Living in close quarters with others    A medical condition such as diabetes, cancer, heart disease, or lung disease    Pregnancy    Age older than 50 years    A weak immune system caused by HIV, AIDS, an organ transplant, or another condition    Traveling to places where other people have the flu  What are the signs and symptoms of the flu?    Fever and chills    Headaches, body aches, and muscle or joint pain    Cough, runny nose, and sore throat    Loss of appetite, nausea, vomiting, or diarrhea    Tiredness    Trouble breathing  How is the flu diagnosed? Your healthcare provider will examine you and ask if you have other health conditions. Tell your provider if you have traveled recently or been around anyone who is sick. Tell your provider if you are pregnant. A sample of fluid may be collected from your nose or throat to be tested for the flu virus.    How is the flu treated? Most people get better within a week. You may need any of the following:    Acetaminophen decreases pain and fever. It is available without a doctor's order. Ask how much to take and how often to take it. Follow directions. Read the labels of all other medicines you are using to see if they also contain acetaminophen, or ask your doctor or pharmacist. Acetaminophen can cause liver damage if not taken correctly.    NSAIDs, such as ibuprofen, help decrease swelling, pain, and fever. This medicine is available with or without a doctor's order. NSAIDs can cause stomach bleeding or kidney problems in certain people. If you take blood thinner medicine, always ask your healthcare provider if NSAIDs are safe for you. Always read the medicine label and follow directions.    Antivirals help fight a viral infection.  How can I manage my symptoms?    Rest as much as you can to help you recover.    Drink liquids as directed to help prevent dehydration. Ask how much liquid to drink each day and which liquids are best for you.  What can I do to prevent the spread of germs?      Wash your hands often. Wash your hands several times each day. Wash after you use the bathroom, change a child's diaper, and before you prepare or eat food. Use soap and water every time. Rub your soapy hands together, lacing your fingers. Wash the front and back of your hands, and in between your fingers. Use the fingers of one hand to scrub under the fingernails of the other hand. Wash for at least 20 seconds. Rinse with warm, running water for several seconds. Then dry your hands with a clean towel or paper towel. Use hand  that contains alcohol if soap and water are not available. Do not touch your eyes, nose, or mouth without washing your hands first.  Handwashing      Cover a sneeze or cough. Use a tissue that covers your mouth and nose. Throw the tissue away in a trash can right away. Use the bend of your arm if a tissue is not available. Wash your hands well with soap and water or use a hand .    Stay away from others while you are sick. Avoid crowds as much as possible.    Ask about vaccines you may need. Talk to your healthcare provider about your vaccine history. Your provider can tell you which vaccines you need, and when to get them.  Get the influenza (flu) vaccine as soon as recommended. The vaccine is usually available starting in September or October. Flu viruses change, so it is important to get a flu vaccine every year.    Get the pneumonia vaccine if recommended. This vaccine is usually recommended every 5 years. Your provider will tell you when to get this vaccine, if needed.  Call your local emergency number (911 in the ) if:    You have trouble breathing, and your lips look purple or blue.    You have a seizure.    You have new pain or pressure in your chest.  When should I seek immediate care?    You are dizzy, or you are urinating less or not at all.    You have a headache with a stiff neck, and you feel tired or confused.    You have new pain or pressure in your chest.    Your symptoms, such as shortness of breath, vomiting, or diarrhea, get worse.    Your symptoms, such as fever and coughing, seem to get better, but then get worse.  When should I call my doctor?    You have new muscle pain or weakness.    You have questions or concerns about your condition or care.

## 2023-12-17 NOTE — ED ADULT TRIAGE NOTE - NS ED NURSE BANDS TYPE
Attempted to notify mom that the request has been sent to Dr. Paul, but no answer left message.   Name band;

## 2023-12-17 NOTE — ED PROVIDER NOTE - CARE PLAN
Principal Discharge DX:	Symptomatic anemia   1 Principal Discharge DX:	Influenza A  Secondary Diagnosis:	Pneumonia

## 2023-12-17 NOTE — ED PROVIDER NOTE - CLINICAL SUMMARY MEDICAL DECISION MAKING FREE TEXT BOX
Patient complaining of 4 days of URI symptoms and a cough with pleuritic back pain on the right.  Afebrile and overall well-appearing in the ER with lungs clear to auscultation bilaterally.  Suspect URI versus pneumonia.  Plan for labs, viral swab, chest x-ray, symptom based medications.  Reassess. See progress notes for further mdm related documentation.

## 2023-12-17 NOTE — ED PROVIDER NOTE - NSICDXPASTMEDICALHX_GEN_ALL_CORE_FT
n/a
PAST MEDICAL HISTORY:  HTN (hypertension)     Hypothyroid     Paroxysmal atrial fibrillation

## 2023-12-17 NOTE — ED PROVIDER NOTE - PATIENT PORTAL LINK FT
You can access the FollowMyHealth Patient Portal offered by [facility] by registering at the following website: [email or web address]. By joining [employer]’s FollowMyHealth portal, you will also be able to view your health information using other applications (apps) compatible with our system. You can access the FollowMyHealth Patient Portal offered by [facility] by registering at the following website: [email or web address]. By joining [employer]’s FollowMyHealth portal, you will also be able to view your health information using other applications (apps) compatible with our system. You can access the FollowMyHealth Patient Portal offered by [facility] by registering at the following website: [email or web address]. By joining [employer]’s FollowMyHealth portal, you will also be able to view your health information using other applications (apps) compatible with our system. You can access the FollowMyHealth Patient Portal offered by [facility] by registering at the following website: [email or web address]. By joining [employer]’s FollowMyHealth portal, you will also be able to view your health information using other applications (apps) compatible with our system.

## 2024-01-04 ENCOUNTER — APPOINTMENT (OUTPATIENT)
Dept: ENDOCRINOLOGY | Facility: CLINIC | Age: 64
End: 2024-01-04
Payer: COMMERCIAL

## 2024-01-04 ENCOUNTER — NON-APPOINTMENT (OUTPATIENT)
Age: 64
End: 2024-01-04

## 2024-01-04 VITALS
WEIGHT: 173 LBS | BODY MASS INDEX: 36.16 KG/M2 | DIASTOLIC BLOOD PRESSURE: 74 MMHG | SYSTOLIC BLOOD PRESSURE: 120 MMHG | HEART RATE: 53 BPM

## 2024-01-04 DIAGNOSIS — R73.03 PREDIABETES.: ICD-10-CM

## 2024-01-04 DIAGNOSIS — E66.9 OBESITY, UNSPECIFIED: ICD-10-CM

## 2024-01-04 DIAGNOSIS — E89.0 POSTPROCEDURAL HYPOTHYROIDISM: ICD-10-CM

## 2024-01-04 DIAGNOSIS — Z86.39 PERSONAL HISTORY OF OTHER ENDOCRINE, NUTRITIONAL AND METABOLIC DISEASE: ICD-10-CM

## 2024-01-04 PROBLEM — I10 ESSENTIAL (PRIMARY) HYPERTENSION: Chronic | Status: ACTIVE | Noted: 2023-12-17

## 2024-01-04 PROBLEM — E03.9 HYPOTHYROIDISM, UNSPECIFIED: Chronic | Status: ACTIVE | Noted: 2023-12-17

## 2024-01-04 PROCEDURE — 99215 OFFICE O/P EST HI 40 MIN: CPT

## 2024-01-05 RX ORDER — LEVOTHYROXINE SODIUM 0.09 MG/1
88 TABLET ORAL DAILY
Qty: 90 | Refills: 1 | Status: ACTIVE | COMMUNITY
Start: 2019-06-14 | End: 1900-01-01

## 2024-01-05 NOTE — HISTORY OF PRESENT ILLNESS
[FreeTextEntry1] : 62 y/o F pt, with Hx of Hypothyroidism developed subsequently s/p MEANS ablation on 3/25/19 for Grave's disease.  Other PMHx: Obesity, Afib,  PSHx: Hysterectomy due to fibroids,  3/22/2022 Today pt presents for thyroid f/u, feeling well with no major complaints but notes she feels "hot all the time." She did not bring her vmedications with her to today's visit. She states she has been taking Levothyroxine in the morning with her other two medications, Eliquis and Amlodipine. Pt has HTN and is seeing a new cardiologist who prescribed blood pressure medication and plans to run multiple tests on pt.   Pt is unsure of weight changes. Denies palpitations, cold intolerance, constipation.    04/06/2023 CC: "I''m feeling well" with no physical complaints.  Pt endorses feeling sleepy.   01/04/2024  Pt has /74 and BMI 36.16. No significant weight change.  CC: "I'm better now" Pt reports that she was dx with flu and pneumonia 2 weeks ago at [facility].  Last week, pt was sent to urgent care for back pain dx muscle spasm. She denies palpitations, diarrhea, Gi disturbances, and cold/heat intolerance.  [Medications verified as per pt on 01/04/2024]  Current Medications: Eliquis 5mg BID, Amlodipine.5 mg QD, Levothyroxine 88 mcg, Atorvastatin 20 mg, Metoprolol 25 mg

## 2024-01-05 NOTE — REVIEW OF SYSTEMS
[As Noted in HPI] : as noted in HPI [Negative] : Heme/Lymph [Palpitations] : no palpitations [Diarrhea] : no diarrhea [Cold Intolerance] : no cold intolerance [Heat Intolerance] : no heat intolerance

## 2024-01-05 NOTE — END OF VISIT
[FreeTextEntry3] :  All medical record entries made by the Scribe were at my, Dr. Kb Dodge, direction and personally dictated by me on 01/04/2024. I have reviewed the chart and agree that the record accurately reflects my personal performance of the history, physical exam, assessment and plan. I have also personally directed, reviewed and agreed with the chart.  [Time Spent: ___ minutes] : I have spent [unfilled] minutes of time on the encounter.

## 2024-01-05 NOTE — HISTORY OF PRESENT ILLNESS
[FreeTextEntry1] : 64 y/o F pt, with Hx of Hypothyroidism developed subsequently s/p MEANS ablation on 3/25/19 for Grave's disease.  Other PMHx: Obesity, Afib,  PSHx: Hysterectomy due to fibroids,  3/22/2022 Today pt presents for thyroid f/u, feeling well with no major complaints but notes she feels "hot all the time." She did not bring her vmedications with her to today's visit. She states she has been taking Levothyroxine in the morning with her other two medications, Eliquis and Amlodipine. Pt has HTN and is seeing a new cardiologist who prescribed blood pressure medication and plans to run multiple tests on pt.   Pt is unsure of weight changes. Denies palpitations, cold intolerance, constipation.    04/06/2023 CC: "I''m feeling well" with no physical complaints.  Pt endorses feeling sleepy.   01/04/2024  Pt has /74 and BMI 36.16. No significant weight change.  CC: "I'm better now" Pt reports that she was dx with flu and pneumonia 2 weeks ago at [facility].  Last week, pt was sent to urgent care for back pain dx muscle spasm. She denies palpitations, diarrhea, Gi disturbances, and cold/heat intolerance.  [Medications verified as per pt on 01/04/2024]  Current Medications: Eliquis 5mg BID, Amlodipine.5 mg QD, Levothyroxine 88 mcg, Atorvastatin 20 mg, Metoprolol 25 mg

## 2024-01-05 NOTE — PHYSICAL EXAM
[Alert] : alert [Normal Sclera/Conjunctiva] : normal sclera/conjunctiva [Normal Outer Ear/Nose] : the ears and nose were normal in appearance [Thyroid Not Enlarged] : the thyroid was not enlarged [No Thyroid Nodules] : no palpable thyroid nodules [No Respiratory Distress] : no respiratory distress [Normal Rate] : heart rate was normal [Regular Rhythm] : with a regular rhythm [No Edema] : no peripheral edema [Normal Bowel Sounds] : normal bowel sounds [Spine Straight] : spine straight [No Stigmata of Cushings Syndrome] : no stigmata of Cushings Syndrome [Normal Gait] : normal gait [No Rash] : no rash [Normal Reflexes] : deep tendon reflexes were 2+ and symmetric [Oriented x3] : oriented to person, place, and time [No Proptosis] : no proptosis [de-identified] : thyroid glands not palpated

## 2024-01-05 NOTE — DATA REVIEWED
[FreeTextEntry1] : Scanned Labs: - 12/22/20 A1c 5.6%, TSH 2.71, Free T4 1.3, s. creat 0.74, Ca 9.7, Vit D 25- OH 16,  - 10/1/20 A1c 5.6%, TSH 13.83, Free T4 1.0, Ca 10.5, s. creat 0.99,

## 2024-01-05 NOTE — PHYSICAL EXAM
[Alert] : alert [Normal Sclera/Conjunctiva] : normal sclera/conjunctiva [Normal Outer Ear/Nose] : the ears and nose were normal in appearance [Thyroid Not Enlarged] : the thyroid was not enlarged [No Thyroid Nodules] : no palpable thyroid nodules [No Respiratory Distress] : no respiratory distress [Normal Rate] : heart rate was normal [Regular Rhythm] : with a regular rhythm [No Edema] : no peripheral edema [Normal Bowel Sounds] : normal bowel sounds [Spine Straight] : spine straight [No Stigmata of Cushings Syndrome] : no stigmata of Cushings Syndrome [Normal Gait] : normal gait [No Rash] : no rash [Normal Reflexes] : deep tendon reflexes were 2+ and symmetric [Oriented x3] : oriented to person, place, and time [No Proptosis] : no proptosis [de-identified] : thyroid glands not palpated

## 2024-01-07 LAB
ESTIMATED AVERAGE GLUCOSE: 117 MG/DL
HBA1C MFR BLD HPLC: 5.7 %
T4 FREE SERPL-MCNC: 1.5 NG/DL
TSH SERPL-ACNC: 2.26 UIU/ML

## 2024-02-26 ENCOUNTER — APPOINTMENT (OUTPATIENT)
Dept: HEART AND VASCULAR | Facility: CLINIC | Age: 64
End: 2024-02-26
Payer: COMMERCIAL

## 2024-02-26 ENCOUNTER — NON-APPOINTMENT (OUTPATIENT)
Age: 64
End: 2024-02-26

## 2024-02-26 VITALS
SYSTOLIC BLOOD PRESSURE: 121 MMHG | DIASTOLIC BLOOD PRESSURE: 56 MMHG | BODY MASS INDEX: 36.31 KG/M2 | HEART RATE: 48 BPM | WEIGHT: 173 LBS | HEIGHT: 58 IN

## 2024-02-26 PROCEDURE — 99214 OFFICE O/P EST MOD 30 MIN: CPT | Mod: 25

## 2024-02-26 PROCEDURE — 93000 ELECTROCARDIOGRAM COMPLETE: CPT

## 2024-02-29 NOTE — DISCUSSION/SUMMARY
[Persistent Atrial Fibrillation] : persistent atrial fibrillation [FreeTextEntry1] : 64 year old woman with PMHx including Grave's disease s/p MEANS March 2019 and persistent atrial fibrillation s/p CRYO PVI 11/1/22. Recurrent AFib now s/p re-do ablation with posterior wall isolation 7/27/23.  1. AFib - Maintaining sinus rhythm post procedure To continue Metoprolol for rate control Recommend patient have sleep study   2. Stroke risk - Continue Eliquis for TE/CVA prophylaxis Would not recommend interruption in anticoagulation within the first 90 days post ablation, but can be interrupted if necessary for colposcopy and possible hysterectomy. Discussed with the patient in detail.  3. F/U 3-4 months, sooner as needed. [EKG obtained to assist in diagnosis and management of assessed problem(s)] : EKG obtained to assist in diagnosis and management of assessed problem(s)

## 2024-02-29 NOTE — HISTORY OF PRESENT ILLNESS
[FreeTextEntry1] : Ms. Brandt is a 62 year old woman with PMHx including atrial fibrillation with ILR s/p DCCV (6/2022) and Grave's disease s/p MEANS (March 2019). She had been in persistent afib over the 1.5 years in the setting of hyperthyroidism. However, she received a radioactive iodine ablation in March 2019 and is now euthyroid. She reports her exercise tolerance dramatically improved (now able to easily climb subway stairs again) and she feels well. She had DCCV 6/15/22.  She had recurrent AFib with HR up to the 130-140s, now s/p repeat DCCV 9/28/22. She feels well and notes improvement in energy and breathing.She is s/p CRYO PVI 11/1/22. Noted to have recurrent persistent AFib, now s/p re-do ablation on 7/27/2023. No evidence of reconnection from prior ablation. Posterior wall isolation performed. Her ILR reached EOS and was explanted on 7/27/23. She denies any recurrent SOB/UMANA or palpitations post procedure. She generally feels well without complaints.  [Paroxysmal Atrial Fibrillation] : paroxysmal atrial fibrillation

## 2024-02-29 NOTE — PHYSICAL EXAM
[General Appearance - Well Developed] : well developed [General Appearance - Well Nourished] : well nourished [Normal Appearance] : normal appearance [Murmurs] : no murmurs present [Heart Sounds] : normal S1 and S2 [Arterial Pulses Normal] : the arterial pulses were normal [] : no respiratory distress [Respiration, Rhythm And Depth] : normal respiratory rhythm and effort [Abdomen Soft] : soft [Abdomen Tenderness] : non-tender [Nail Clubbing] : no clubbing of the fingernails [Cyanosis, Localized] : no localized cyanosis [Normal Conjunctiva] : the conjunctiva exhibited no abnormalities [Normal Jugular Venous V Waves Present] : normal jugular venous V waves present [Normal Oral Mucosa] : normal oral mucosa [Abnormal Walk] : normal gait [Oriented To Time, Place, And Person] : oriented to person, place, and time [Impaired Insight] : insight and judgment were intact [Affect] : the affect was normal [FreeTextEntry1] : irregularly irregular, tachycardic

## 2024-02-29 NOTE — ADDENDUM
[FreeTextEntry1] : I, Gracie Gregory, am scribing for and the presence of Dr. Janie Lemus, the following sections: HPI, PMH,Family/social history, ROS, Physical Exam, Assessment / Plan. I, Janie Lemus, hereby attest that the medical record entry for this patient accurately reflects signatures/notations that I made on the Date of Service in my capacity as an Attending Physician when I treated/diagnosed the above patient. I do hereby attest that this information is true, accurate and complete to the best of my knowledge.  I was present for the entire visit and supervised the entire visit and made/agree with the plan as outlined.

## 2024-06-05 ENCOUNTER — NON-APPOINTMENT (OUTPATIENT)
Age: 64
End: 2024-06-05

## 2024-06-05 ENCOUNTER — APPOINTMENT (OUTPATIENT)
Dept: HEART AND VASCULAR | Facility: CLINIC | Age: 64
End: 2024-06-05
Payer: COMMERCIAL

## 2024-06-05 VITALS
SYSTOLIC BLOOD PRESSURE: 122 MMHG | WEIGHT: 178 LBS | HEIGHT: 58 IN | HEART RATE: 51 BPM | DIASTOLIC BLOOD PRESSURE: 60 MMHG | TEMPERATURE: 97.4 F | BODY MASS INDEX: 37.36 KG/M2 | OXYGEN SATURATION: 96 %

## 2024-06-05 DIAGNOSIS — I48.91 UNSPECIFIED ATRIAL FIBRILLATION: ICD-10-CM

## 2024-06-05 PROCEDURE — 99214 OFFICE O/P EST MOD 30 MIN: CPT | Mod: 25

## 2024-06-05 PROCEDURE — 36415 COLL VENOUS BLD VENIPUNCTURE: CPT

## 2024-06-05 PROCEDURE — 93000 ELECTROCARDIOGRAM COMPLETE: CPT

## 2024-06-05 RX ORDER — SEMAGLUTIDE 0.25 MG/.5ML
0.25 INJECTION, SOLUTION SUBCUTANEOUS
Qty: 4 | Refills: 5 | Status: ACTIVE | COMMUNITY
Start: 2024-06-05 | End: 1900-01-01

## 2024-06-05 NOTE — ASSESSMENT
[FreeTextEntry1] : - HTN controlled on amlodipine 5 mg daily - afib on eliquis 5 mg bid toprol 25 mg daily - optimized from a cardiac optimized for colonoscopy stop eliquis 2 days prior resume when surgically allowable - obesity trial wegovy  - fu in six months

## 2024-06-05 NOTE — HISTORY OF PRESENT ILLNESS
[FreeTextEntry1] : 64 F AFib ablation s/p Cryo PVI 11/1/2022, 7/27/23  ILR Graves s/p MEANS Hypothyroid on replacement therapy Gout Hysterectomy 10/2023   here for preoperative clearance prior to colonoscopy she has no cardiac complaints she has good exercise tolerance    ecg sb 6/5/24  ZAID 6/15/2022 EF normal

## 2024-06-05 NOTE — PHYSICAL EXAM

## 2024-06-06 ENCOUNTER — NON-APPOINTMENT (OUTPATIENT)
Age: 64
End: 2024-06-06

## 2024-06-06 LAB
ALBUMIN SERPL ELPH-MCNC: 4.4 G/DL
ALP BLD-CCNC: 105 U/L
ALT SERPL-CCNC: 9 U/L
ANION GAP SERPL CALC-SCNC: 12 MMOL/L
AST SERPL-CCNC: 14 U/L
BILIRUB SERPL-MCNC: 0.2 MG/DL
BUN SERPL-MCNC: 10 MG/DL
CALCIUM SERPL-MCNC: 9.6 MG/DL
CHLORIDE SERPL-SCNC: 104 MMOL/L
CHOLEST SERPL-MCNC: 211 MG/DL
CO2 SERPL-SCNC: 27 MMOL/L
CREAT SERPL-MCNC: 0.68 MG/DL
CREAT SPEC-SCNC: 142 MG/DL
EGFR: 98 ML/MIN/1.73M2
GLUCOSE SERPL-MCNC: 110 MG/DL
HCT VFR BLD CALC: 39.8 %
HDLC SERPL-MCNC: 61 MG/DL
HGB BLD-MCNC: 12.5 G/DL
LDLC SERPL CALC-MCNC: 108 MG/DL
MCHC RBC-ENTMCNC: 30.1 PG
MCHC RBC-ENTMCNC: 31.4 GM/DL
MCV RBC AUTO: 95.9 FL
MICROALBUMIN 24H UR DL<=1MG/L-MCNC: <1.2 MG/DL
MICROALBUMIN/CREAT 24H UR-RTO: NORMAL MG/G
NONHDLC SERPL-MCNC: 150 MG/DL
PLATELET # BLD AUTO: 341 K/UL
POTASSIUM SERPL-SCNC: 3.8 MMOL/L
PROT SERPL-MCNC: 7.5 G/DL
RBC # BLD: 4.15 M/UL
RBC # FLD: 14.2 %
SODIUM SERPL-SCNC: 143 MMOL/L
TRIGL SERPL-MCNC: 245 MG/DL
WBC # FLD AUTO: 8.63 K/UL

## 2024-06-10 LAB
ESTIMATED AVERAGE GLUCOSE: 114 MG/DL
HBA1C MFR BLD HPLC: 5.6 %

## 2024-07-08 ENCOUNTER — NON-APPOINTMENT (OUTPATIENT)
Age: 64
End: 2024-07-08

## 2024-07-08 ENCOUNTER — APPOINTMENT (OUTPATIENT)
Dept: HEART AND VASCULAR | Facility: CLINIC | Age: 64
End: 2024-07-08
Payer: COMMERCIAL

## 2024-07-08 VITALS
SYSTOLIC BLOOD PRESSURE: 126 MMHG | BODY MASS INDEX: 37.36 KG/M2 | DIASTOLIC BLOOD PRESSURE: 60 MMHG | WEIGHT: 178 LBS | HEIGHT: 58 IN | HEART RATE: 51 BPM

## 2024-07-08 PROCEDURE — 93000 ELECTROCARDIOGRAM COMPLETE: CPT

## 2024-07-08 PROCEDURE — 99213 OFFICE O/P EST LOW 20 MIN: CPT | Mod: 25

## 2024-07-08 PROCEDURE — G2211 COMPLEX E/M VISIT ADD ON: CPT | Mod: NC

## 2024-08-22 ENCOUNTER — NON-APPOINTMENT (OUTPATIENT)
Age: 64
End: 2024-08-22

## 2024-08-22 ENCOUNTER — APPOINTMENT (OUTPATIENT)
Dept: ENDOCRINOLOGY | Facility: CLINIC | Age: 64
End: 2024-08-22
Payer: COMMERCIAL

## 2024-08-22 VITALS
WEIGHT: 178 LBS | DIASTOLIC BLOOD PRESSURE: 74 MMHG | SYSTOLIC BLOOD PRESSURE: 109 MMHG | BODY MASS INDEX: 37.2 KG/M2 | HEART RATE: 68 BPM

## 2024-08-22 DIAGNOSIS — R73.03 PREDIABETES.: ICD-10-CM

## 2024-08-22 DIAGNOSIS — E89.0 POSTPROCEDURAL HYPOTHYROIDISM: ICD-10-CM

## 2024-08-22 PROCEDURE — 36415 COLL VENOUS BLD VENIPUNCTURE: CPT

## 2024-08-22 PROCEDURE — 99214 OFFICE O/P EST MOD 30 MIN: CPT | Mod: 25

## 2024-08-23 LAB
T3FREE SERPL-MCNC: 2.64 PG/ML
T4 FREE SERPL-MCNC: 1.4 NG/DL
TSH SERPL-ACNC: 0.13 UIU/ML

## 2024-08-23 NOTE — HISTORY OF PRESENT ILLNESS
[FreeTextEntry1] : 63 y/o F pt, with Hx of Hypothyroidism developed subsequently s/p MEANS ablation on 3/25/19 for Grave's disease.  Other PMHx: Obesity, Afib,  PSHx: Hysterectomy due to fibroids,  3/22/2022 Today pt presents for thyroid f/u, feeling well with no major complaints but notes she feels "hot all the time." She did not bring her vmedications with her to today's visit. She states she has been taking Levothyroxine in the morning with her other two medications, Eliquis and Amlodipine. Pt has HTN and is seeing a new cardiologist who prescribed blood pressure medication and plans to run multiple tests on pt.   Pt is unsure of weight changes. Denies palpitations, cold intolerance, constipation.    04/06/2023 CC: "I''m feeling well" with no physical complaints.  Pt endorses feeling sleepy.   01/04/2024  Pt has /74 and BMI 36.16. No significant weight change.  CC: "I'm better now" Pt reports that she was dx with flu and pneumonia 2 weeks ago at [facility].  Last week, pt was sent to urgent care for back pain dx muscle spasm. She denies palpitations, diarrhea, Gi disturbances, and cold/heat intolerance.  08/22/2024  Pt has /74 and BMI 37.2. No significant weight change.  CC: "I'm doing okay." Pt states that she is taking the same medications. She reports some familial stress with her son.   [Medications verified as per pt on 08/22/2024] Current Medications: Eliquis 5mg BID, Amlodipine.5 mg QD, Levothyroxine 88 mcg, Metoprolol 25 mg Unverified: Atorvastatin 20 mg

## 2024-08-23 NOTE — PHYSICAL EXAM
[Alert] : alert [Normal Sclera/Conjunctiva] : normal sclera/conjunctiva [No Proptosis] : no proptosis [Normal Outer Ear/Nose] : the ears and nose were normal in appearance [Thyroid Not Enlarged] : the thyroid was not enlarged [No Thyroid Nodules] : no palpable thyroid nodules [No Respiratory Distress] : no respiratory distress [Normal Rate] : heart rate was normal [Regular Rhythm] : with a regular rhythm [No Edema] : no peripheral edema [Normal Bowel Sounds] : normal bowel sounds [Spine Straight] : spine straight [No Stigmata of Cushings Syndrome] : no stigmata of Cushings Syndrome [Normal Gait] : normal gait [No Rash] : no rash [Normal Reflexes] : deep tendon reflexes were 2+ and symmetric [Oriented x3] : oriented to person, place, and time [de-identified] : thyroid glands not palpated

## 2024-08-23 NOTE — DATA REVIEWED
Pt assisted onto bed pan. Pt attempting to void at this time.   [FreeTextEntry1] : Scanned Labs: - 12/22/20 A1c 5.6%, TSH 2.71, Free T4 1.3, s. creat 0.74, Ca 9.7, Vit D 25- OH 16,  - 10/1/20 A1c 5.6%, TSH 13.83, Free T4 1.0, Ca 10.5, s. creat 0.99,

## 2024-08-23 NOTE — PHYSICAL EXAM
[Alert] : alert [Normal Sclera/Conjunctiva] : normal sclera/conjunctiva [No Proptosis] : no proptosis [Normal Outer Ear/Nose] : the ears and nose were normal in appearance [Thyroid Not Enlarged] : the thyroid was not enlarged [No Thyroid Nodules] : no palpable thyroid nodules [No Respiratory Distress] : no respiratory distress [Normal Rate] : heart rate was normal [Regular Rhythm] : with a regular rhythm [No Edema] : no peripheral edema [Normal Bowel Sounds] : normal bowel sounds [Spine Straight] : spine straight [No Stigmata of Cushings Syndrome] : no stigmata of Cushings Syndrome [Normal Gait] : normal gait [No Rash] : no rash [Normal Reflexes] : deep tendon reflexes were 2+ and symmetric [Oriented x3] : oriented to person, place, and time [de-identified] : thyroid glands not palpated

## 2024-08-23 NOTE — END OF VISIT
[FreeTextEntry3] :  All medical record entries made by the Scribe were at my, Dr. Kb Dodge, direction and personally dictated by me on 08/22/2024. I have reviewed the chart and agree that the record accurately reflects my personal performance of the history, physical exam, assessment and plan. I have also personally directed, reviewed and agreed with the chart. [Time Spent: ___ minutes] : I have spent [unfilled] minutes of time on the encounter which excludes teaching and separately reported services.

## 2024-08-23 NOTE — HISTORY OF PRESENT ILLNESS
[FreeTextEntry1] : 63 y/o F pt, with Hx of Hypothyroidism developed subsequently s/p MEANS ablation on 3/25/19 for Grave's disease.  Other PMHx: Obesity, Afib,  PSHx: Hysterectomy due to fibroids,  3/22/2022 Today pt presents for thyroid f/u, feeling well with no major complaints but notes she feels "hot all the time." She did not bring her vmedications with her to today's visit. She states she has been taking Levothyroxine in the morning with her other two medications, Eliquis and Amlodipine. Pt has HTN and is seeing a new cardiologist who prescribed blood pressure medication and plans to run multiple tests on pt.   Pt is unsure of weight changes. Denies palpitations, cold intolerance, constipation.    04/06/2023 CC: "I''m feeling well" with no physical complaints.  Pt endorses feeling sleepy.   01/04/2024  Pt has /74 and BMI 36.16. No significant weight change.  CC: "I'm better now" Pt reports that she was dx with flu and pneumonia 2 weeks ago at [facility].  Last week, pt was sent to urgent care for back pain dx muscle spasm. She denies palpitations, diarrhea, Gi disturbances, and cold/heat intolerance.  08/22/2024  Pt has /74 and BMI 37.2. No significant weight change.  CC: "I'm doing okay." Pt states that she is taking the same medications. She reports some familial stress with her son.   [Medications verified as per pt on 08/22/2024] Current Medications: Eliquis 5mg BID, Amlodipine.5 mg QD, Levothyroxine 88 mcg, Metoprolol 25 mg Unverified: Atorvastatin 20 mg

## 2024-08-27 ENCOUNTER — NON-APPOINTMENT (OUTPATIENT)
Age: 64
End: 2024-08-27

## 2024-09-06 RX ORDER — SODIUM SULFATE, MAGNESIUM SULFATE, AND POTASSIUM CHLORIDE 17.75; 2.7; 2.25 G/1; G/1; G/1
1479-225-188 TABLET ORAL
Qty: 1 | Refills: 0 | Status: DISCONTINUED | COMMUNITY
Start: 2024-08-29 | End: 2024-09-06

## 2024-09-06 RX ORDER — SODIUM SULFATE, POTASSIUM SULFATE AND MAGNESIUM SULFATE 1.6; 3.13; 17.5 G/177ML; G/177ML; G/177ML
17.5-3.13-1.6 SOLUTION ORAL
Qty: 1 | Refills: 0 | Status: ACTIVE | COMMUNITY
Start: 2024-09-06 | End: 1900-01-01

## 2024-09-12 ENCOUNTER — APPOINTMENT (OUTPATIENT)
Dept: GASTROENTEROLOGY | Facility: CLINIC | Age: 64
End: 2024-09-12
Payer: COMMERCIAL

## 2024-09-12 PROCEDURE — 45378 DIAGNOSTIC COLONOSCOPY: CPT | Mod: 33

## 2024-12-04 ENCOUNTER — APPOINTMENT (OUTPATIENT)
Dept: HEART AND VASCULAR | Facility: CLINIC | Age: 64
End: 2024-12-04

## 2025-02-06 ENCOUNTER — APPOINTMENT (OUTPATIENT)
Dept: HEART AND VASCULAR | Facility: CLINIC | Age: 65
End: 2025-02-06
Payer: COMMERCIAL

## 2025-02-06 ENCOUNTER — NON-APPOINTMENT (OUTPATIENT)
Age: 65
End: 2025-02-06

## 2025-02-06 VITALS
OXYGEN SATURATION: 95 % | WEIGHT: 184 LBS | BODY MASS INDEX: 38.62 KG/M2 | TEMPERATURE: 97.8 F | HEIGHT: 58 IN | HEART RATE: 87 BPM | SYSTOLIC BLOOD PRESSURE: 100 MMHG | DIASTOLIC BLOOD PRESSURE: 70 MMHG

## 2025-02-06 DIAGNOSIS — I10 ESSENTIAL (PRIMARY) HYPERTENSION: ICD-10-CM

## 2025-02-06 DIAGNOSIS — I48.91 UNSPECIFIED ATRIAL FIBRILLATION: ICD-10-CM

## 2025-02-06 PROCEDURE — 93000 ELECTROCARDIOGRAM COMPLETE: CPT

## 2025-02-06 PROCEDURE — 99214 OFFICE O/P EST MOD 30 MIN: CPT

## 2025-02-06 PROCEDURE — 36415 COLL VENOUS BLD VENIPUNCTURE: CPT

## 2025-02-07 LAB
ALBUMIN SERPL ELPH-MCNC: 4.3 G/DL
ALP BLD-CCNC: 104 U/L
ALT SERPL-CCNC: 12 U/L
ANION GAP SERPL CALC-SCNC: 10 MMOL/L
AST SERPL-CCNC: 12 U/L
BILIRUB SERPL-MCNC: 0.3 MG/DL
BUN SERPL-MCNC: 16 MG/DL
CALCIUM SERPL-MCNC: 9.7 MG/DL
CHLORIDE SERPL-SCNC: 106 MMOL/L
CHOLEST SERPL-MCNC: 193 MG/DL
CO2 SERPL-SCNC: 28 MMOL/L
CREAT SERPL-MCNC: 1.19 MG/DL
CREAT SPEC-SCNC: 836 MG/DL
EGFR: 51 ML/MIN/1.73M2
ESTIMATED AVERAGE GLUCOSE: 123 MG/DL
GLUCOSE SERPL-MCNC: 94 MG/DL
HBA1C MFR BLD HPLC: 5.9 %
HCT VFR BLD CALC: 37 %
HDLC SERPL-MCNC: 64 MG/DL
HGB BLD-MCNC: 12.5 G/DL
LDLC SERPL CALC-MCNC: 97 MG/DL
MCHC RBC-ENTMCNC: 31.4 PG
MCHC RBC-ENTMCNC: 33.8 G/DL
MCV RBC AUTO: 93 FL
MICROALBUMIN 24H UR DL<=1MG/L-MCNC: 4.7 MG/DL
MICROALBUMIN/CREAT 24H UR-RTO: 6 MG/G
NONHDLC SERPL-MCNC: 129 MG/DL
NT-PROBNP SERPL-MCNC: 434 PG/ML
PLATELET # BLD AUTO: 306 K/UL
POTASSIUM SERPL-SCNC: 3.9 MMOL/L
PROT SERPL-MCNC: 6.9 G/DL
RBC # BLD: 3.98 M/UL
RBC # FLD: 15 %
SODIUM SERPL-SCNC: 144 MMOL/L
TRIGL SERPL-MCNC: 190 MG/DL
WBC # FLD AUTO: 9.62 K/UL

## 2025-02-25 ENCOUNTER — APPOINTMENT (OUTPATIENT)
Dept: HEART AND VASCULAR | Facility: CLINIC | Age: 65
End: 2025-02-25
Payer: COMMERCIAL

## 2025-02-25 VITALS — DIASTOLIC BLOOD PRESSURE: 74 MMHG | SYSTOLIC BLOOD PRESSURE: 100 MMHG

## 2025-02-25 PROCEDURE — 93306 TTE W/DOPPLER COMPLETE: CPT

## 2025-03-13 ENCOUNTER — APPOINTMENT (OUTPATIENT)
Dept: HEART AND VASCULAR | Facility: CLINIC | Age: 65
End: 2025-03-13
Payer: COMMERCIAL

## 2025-03-13 ENCOUNTER — APPOINTMENT (OUTPATIENT)
Dept: HEART AND VASCULAR | Facility: CLINIC | Age: 65
End: 2025-03-13

## 2025-03-13 ENCOUNTER — NON-APPOINTMENT (OUTPATIENT)
Age: 65
End: 2025-03-13

## 2025-03-13 VITALS
WEIGHT: 181 LBS | SYSTOLIC BLOOD PRESSURE: 108 MMHG | DIASTOLIC BLOOD PRESSURE: 82 MMHG | HEART RATE: 94 BPM | OXYGEN SATURATION: 99 % | BODY MASS INDEX: 37.99 KG/M2 | HEIGHT: 58 IN

## 2025-03-13 DIAGNOSIS — G47.39 OTHER SLEEP APNEA: ICD-10-CM

## 2025-03-13 PROCEDURE — 99214 OFFICE O/P EST MOD 30 MIN: CPT | Mod: 25

## 2025-03-13 PROCEDURE — 93000 ELECTROCARDIOGRAM COMPLETE: CPT | Mod: 59

## 2025-03-13 PROCEDURE — 95800 SLP STDY UNATTENDED: CPT | Mod: TC

## 2025-04-03 NOTE — PATIENT PROFILE ADULT - PATIENT'S PREFERRED PRONOUN
Problem: Pain  Goal: Verbalizes/displays adequate comfort level or baseline comfort level  Outcome: Completed      Her/She

## 2025-04-24 ENCOUNTER — APPOINTMENT (OUTPATIENT)
Dept: BARIATRICS | Facility: CLINIC | Age: 65
End: 2025-04-24

## 2025-04-24 ENCOUNTER — NON-APPOINTMENT (OUTPATIENT)
Age: 65
End: 2025-04-24

## 2025-04-24 VITALS
HEIGHT: 58 IN | WEIGHT: 185.6 LBS | TEMPERATURE: 97.5 F | DIASTOLIC BLOOD PRESSURE: 81 MMHG | BODY MASS INDEX: 38.96 KG/M2 | OXYGEN SATURATION: 98 % | SYSTOLIC BLOOD PRESSURE: 117 MMHG | HEART RATE: 85 BPM

## 2025-04-24 DIAGNOSIS — Z00.00 ENCOUNTER FOR GENERAL ADULT MEDICAL EXAMINATION W/OUT ABNORMAL FINDINGS: ICD-10-CM

## 2025-04-24 DIAGNOSIS — Z86.79 PERSONAL HISTORY OF OTHER DISEASES OF THE CIRCULATORY SYSTEM: ICD-10-CM

## 2025-04-24 DIAGNOSIS — E66.01 MORBID (SEVERE) OBESITY DUE TO EXCESS CALORIES: ICD-10-CM

## 2025-04-24 PROCEDURE — 99204 OFFICE O/P NEW MOD 45 MIN: CPT

## 2025-05-13 ENCOUNTER — APPOINTMENT (OUTPATIENT)
Dept: HEART AND VASCULAR | Facility: CLINIC | Age: 65
End: 2025-05-13
Payer: COMMERCIAL

## 2025-05-13 ENCOUNTER — NON-APPOINTMENT (OUTPATIENT)
Age: 65
End: 2025-05-13

## 2025-05-13 VITALS
HEIGHT: 58 IN | OXYGEN SATURATION: 95 % | HEART RATE: 94 BPM | BODY MASS INDEX: 38.2 KG/M2 | DIASTOLIC BLOOD PRESSURE: 72 MMHG | SYSTOLIC BLOOD PRESSURE: 110 MMHG | WEIGHT: 182 LBS | TEMPERATURE: 97.6 F

## 2025-05-13 DIAGNOSIS — G47.30 SLEEP APNEA, UNSPECIFIED: ICD-10-CM

## 2025-05-13 DIAGNOSIS — I48.91 UNSPECIFIED ATRIAL FIBRILLATION: ICD-10-CM

## 2025-05-13 PROCEDURE — 99214 OFFICE O/P EST MOD 30 MIN: CPT

## 2025-05-13 PROCEDURE — 36415 COLL VENOUS BLD VENIPUNCTURE: CPT

## 2025-05-13 PROCEDURE — 93000 ELECTROCARDIOGRAM COMPLETE: CPT

## 2025-05-13 RX ORDER — TIRZEPATIDE 2.5 MG/.5ML
2.5 INJECTION, SOLUTION SUBCUTANEOUS
Qty: 4 | Refills: 5 | Status: ACTIVE | COMMUNITY
Start: 2025-05-13 | End: 1900-01-01

## 2025-05-14 DIAGNOSIS — E78.5 HYPERLIPIDEMIA, UNSPECIFIED: ICD-10-CM

## 2025-05-14 LAB
ALBUMIN SERPL ELPH-MCNC: 4.3 G/DL
ALP BLD-CCNC: 111 U/L
ALT SERPL-CCNC: 16 U/L
ANION GAP SERPL CALC-SCNC: 16 MMOL/L
AST SERPL-CCNC: 16 U/L
BILIRUB SERPL-MCNC: 0.3 MG/DL
BUN SERPL-MCNC: 9 MG/DL
CALCIUM SERPL-MCNC: 10.2 MG/DL
CHLORIDE SERPL-SCNC: 104 MMOL/L
CHOLEST SERPL-MCNC: 190 MG/DL
CO2 SERPL-SCNC: 24 MMOL/L
CREAT SERPL-MCNC: 0.79 MG/DL
CREAT SPEC-SCNC: 462 MG/DL
EGFRCR SERPLBLD CKD-EPI 2021: 83 ML/MIN/1.73M2
ESTIMATED AVERAGE GLUCOSE: 120 MG/DL
GLUCOSE SERPL-MCNC: 92 MG/DL
HBA1C MFR BLD HPLC: 5.8 %
HCT VFR BLD CALC: 42.3 %
HDLC SERPL-MCNC: 57 MG/DL
HGB BLD-MCNC: 13.3 G/DL
LDLC SERPL-MCNC: 98 MG/DL
MCHC RBC-ENTMCNC: 30.7 PG
MCHC RBC-ENTMCNC: 31.4 G/DL
MCV RBC AUTO: 97.7 FL
MICROALBUMIN 24H UR DL<=1MG/L-MCNC: 2.9 MG/DL
MICROALBUMIN/CREAT 24H UR-RTO: 6 MG/G
NONHDLC SERPL-MCNC: 133 MG/DL
NT-PROBNP SERPL-MCNC: 637 PG/ML
PLATELET # BLD AUTO: 323 K/UL
POTASSIUM SERPL-SCNC: 3.8 MMOL/L
PROT SERPL-MCNC: 7 G/DL
RBC # BLD: 4.33 M/UL
RBC # FLD: 14 %
SODIUM SERPL-SCNC: 144 MMOL/L
T4 FREE SERPL-MCNC: 1.5 NG/DL
TRIGL SERPL-MCNC: 204 MG/DL
TSH SERPL-ACNC: 2.2 UIU/ML
WBC # FLD AUTO: 9.6 K/UL

## 2025-05-14 RX ORDER — ATORVASTATIN CALCIUM 20 MG/1
20 TABLET, FILM COATED ORAL
Qty: 90 | Refills: 3 | Status: ACTIVE | COMMUNITY
Start: 2025-05-14 | End: 1900-01-01

## 2025-05-19 ENCOUNTER — APPOINTMENT (OUTPATIENT)
Dept: ENDOCRINOLOGY | Facility: CLINIC | Age: 65
End: 2025-05-19

## 2025-06-23 ENCOUNTER — APPOINTMENT (OUTPATIENT)
Dept: HEART AND VASCULAR | Facility: CLINIC | Age: 65
End: 2025-06-23
Payer: COMMERCIAL

## 2025-06-23 VITALS
HEIGHT: 58 IN | DIASTOLIC BLOOD PRESSURE: 69 MMHG | BODY MASS INDEX: 38.2 KG/M2 | SYSTOLIC BLOOD PRESSURE: 115 MMHG | WEIGHT: 182 LBS | HEART RATE: 87 BPM

## 2025-06-23 PROCEDURE — G2211 COMPLEX E/M VISIT ADD ON: CPT | Mod: NC

## 2025-06-23 PROCEDURE — 99213 OFFICE O/P EST LOW 20 MIN: CPT

## 2025-06-23 PROCEDURE — 93000 ELECTROCARDIOGRAM COMPLETE: CPT

## 2025-07-08 ENCOUNTER — OUTPATIENT (OUTPATIENT)
Dept: OUTPATIENT SERVICES | Facility: HOSPITAL | Age: 65
LOS: 1 days | Discharge: HOME | End: 2025-07-08
Payer: COMMERCIAL

## 2025-07-08 PROCEDURE — 82947 ASSAY GLUCOSE BLOOD QUANT: CPT

## 2025-07-08 PROCEDURE — 82330 ASSAY OF CALCIUM: CPT

## 2025-07-08 PROCEDURE — 92960 CARDIOVERSION ELECTRIC EXT: CPT

## 2025-07-08 PROCEDURE — 82565 ASSAY OF CREATININE: CPT

## 2025-07-08 PROCEDURE — 84295 ASSAY OF SERUM SODIUM: CPT

## 2025-07-08 PROCEDURE — 82803 BLOOD GASES ANY COMBINATION: CPT

## 2025-07-08 PROCEDURE — 84132 ASSAY OF SERUM POTASSIUM: CPT

## 2025-07-08 PROCEDURE — 85014 HEMATOCRIT: CPT

## 2025-07-08 PROCEDURE — 85610 PROTHROMBIN TIME: CPT

## 2025-07-16 ENCOUNTER — APPOINTMENT (OUTPATIENT)
Dept: ENDOCRINOLOGY | Facility: CLINIC | Age: 65
End: 2025-07-16
Payer: COMMERCIAL

## 2025-07-16 VITALS
BODY MASS INDEX: 38.67 KG/M2 | HEART RATE: 58 BPM | DIASTOLIC BLOOD PRESSURE: 71 MMHG | WEIGHT: 185 LBS | SYSTOLIC BLOOD PRESSURE: 112 MMHG

## 2025-07-16 PROBLEM — E66.812 OBESITY, CLASS II, BMI 35-39.9: Status: ACTIVE | Noted: 2024-01-04

## 2025-07-16 PROCEDURE — 99215 OFFICE O/P EST HI 40 MIN: CPT

## 2025-07-16 RX ORDER — TIRZEPATIDE 2.5 MG/.5ML
2.5 INJECTION, SOLUTION SUBCUTANEOUS
Qty: 1 | Refills: 1 | Status: ACTIVE | COMMUNITY
Start: 2025-07-16 | End: 1900-01-01

## 2025-07-30 ENCOUNTER — APPOINTMENT (OUTPATIENT)
Dept: HEART AND VASCULAR | Facility: CLINIC | Age: 65
End: 2025-07-30
Payer: COMMERCIAL

## 2025-07-30 VITALS
DIASTOLIC BLOOD PRESSURE: 72 MMHG | SYSTOLIC BLOOD PRESSURE: 118 MMHG | BODY MASS INDEX: 37.79 KG/M2 | HEART RATE: 67 BPM | TEMPERATURE: 97.8 F | WEIGHT: 180 LBS | HEIGHT: 58 IN | OXYGEN SATURATION: 97 %

## 2025-07-30 DIAGNOSIS — I48.91 UNSPECIFIED ATRIAL FIBRILLATION: ICD-10-CM

## 2025-07-30 DIAGNOSIS — R73.03 PREDIABETES.: ICD-10-CM

## 2025-07-30 PROCEDURE — 93000 ELECTROCARDIOGRAM COMPLETE: CPT

## 2025-07-30 PROCEDURE — 99214 OFFICE O/P EST MOD 30 MIN: CPT | Mod: 25

## 2025-08-12 ENCOUNTER — APPOINTMENT (OUTPATIENT)
Dept: HEART AND VASCULAR | Facility: CLINIC | Age: 65
End: 2025-08-12
Payer: COMMERCIAL

## 2025-08-12 VITALS
SYSTOLIC BLOOD PRESSURE: 120 MMHG | DIASTOLIC BLOOD PRESSURE: 75 MMHG | BODY MASS INDEX: 37.78 KG/M2 | TEMPERATURE: 98.1 F | HEIGHT: 58 IN | WEIGHT: 179.99 LBS | OXYGEN SATURATION: 98 % | HEART RATE: 84 BPM

## 2025-08-12 DIAGNOSIS — R06.02 SHORTNESS OF BREATH: ICD-10-CM

## 2025-08-12 PROCEDURE — 99214 OFFICE O/P EST MOD 30 MIN: CPT

## 2025-08-12 PROCEDURE — G2211 COMPLEX E/M VISIT ADD ON: CPT | Mod: NC

## 2025-08-12 PROCEDURE — 93000 ELECTROCARDIOGRAM COMPLETE: CPT

## 2025-08-12 RX ORDER — TIRZEPATIDE 2.5 MG/.5ML
2.5 INJECTION, SOLUTION SUBCUTANEOUS
Qty: 1 | Refills: 5 | Status: ACTIVE | COMMUNITY
Start: 2025-07-30 | End: 1900-01-01

## 2025-08-13 ENCOUNTER — LABORATORY RESULT (OUTPATIENT)
Age: 65
End: 2025-08-13

## 2025-08-13 LAB
INFLUENZA A RESULT: NOT DETECTED
INFLUENZA B RESULT: NOT DETECTED
RESP SYN VIRUS RESULT: NOT DETECTED
SARS-COV-2 RESULT: NOT DETECTED

## 2025-08-18 ENCOUNTER — APPOINTMENT (OUTPATIENT)
Dept: HEART AND VASCULAR | Facility: CLINIC | Age: 65
End: 2025-08-18
Payer: COMMERCIAL

## 2025-08-18 VITALS
DIASTOLIC BLOOD PRESSURE: 76 MMHG | SYSTOLIC BLOOD PRESSURE: 119 MMHG | TEMPERATURE: 97.8 F | HEIGHT: 58 IN | WEIGHT: 180 LBS | OXYGEN SATURATION: 96 % | HEART RATE: 75 BPM | BODY MASS INDEX: 37.79 KG/M2

## 2025-08-18 PROCEDURE — G2211 COMPLEX E/M VISIT ADD ON: CPT | Mod: NC

## 2025-08-18 PROCEDURE — 93000 ELECTROCARDIOGRAM COMPLETE: CPT

## 2025-08-18 PROCEDURE — 99213 OFFICE O/P EST LOW 20 MIN: CPT

## 2025-08-27 RX ORDER — TIRZEPATIDE 2.5 MG/.5ML
2.5 INJECTION, SOLUTION SUBCUTANEOUS
Qty: 1 | Refills: 0 | Status: ACTIVE | COMMUNITY
Start: 2025-08-26 | End: 1900-01-01

## 2025-09-15 ENCOUNTER — APPOINTMENT (OUTPATIENT)
Dept: SLEEP CENTER | Facility: HOSPITAL | Age: 65
End: 2025-09-15